# Patient Record
Sex: MALE | Race: BLACK OR AFRICAN AMERICAN | NOT HISPANIC OR LATINO | Employment: OTHER | URBAN - METROPOLITAN AREA
[De-identification: names, ages, dates, MRNs, and addresses within clinical notes are randomized per-mention and may not be internally consistent; named-entity substitution may affect disease eponyms.]

---

## 2020-07-22 ENCOUNTER — LAB VISIT (OUTPATIENT)
Dept: LAB | Facility: OTHER | Age: 80
End: 2020-07-22
Payer: MEDICARE

## 2020-07-22 DIAGNOSIS — Z03.818 ENCOUNTER FOR OBSERVATION FOR SUSPECTED EXPOSURE TO OTHER BIOLOGICAL AGENTS RULED OUT: ICD-10-CM

## 2020-07-22 DIAGNOSIS — Z20.822 SUSPECTED COVID-19 VIRUS INFECTION: ICD-10-CM

## 2020-07-22 PROCEDURE — U0003 INFECTIOUS AGENT DETECTION BY NUCLEIC ACID (DNA OR RNA); SEVERE ACUTE RESPIRATORY SYNDROME CORONAVIRUS 2 (SARS-COV-2) (CORONAVIRUS DISEASE [COVID-19]), AMPLIFIED PROBE TECHNIQUE, MAKING USE OF HIGH THROUGHPUT TECHNOLOGIES AS DESCRIBED BY CMS-2020-01-R: HCPCS

## 2020-07-26 LAB — SARS-COV-2 RNA RESP QL NAA+PROBE: NEGATIVE

## 2020-08-18 ENCOUNTER — HOSPITAL ENCOUNTER (EMERGENCY)
Facility: HOSPITAL | Age: 80
Discharge: HOME | End: 2020-08-18
Attending: EMERGENCY MEDICINE
Payer: MEDICARE

## 2020-08-18 VITALS
TEMPERATURE: 98 F | DIASTOLIC BLOOD PRESSURE: 85 MMHG | SYSTOLIC BLOOD PRESSURE: 178 MMHG | HEART RATE: 101 BPM | RESPIRATION RATE: 20 BRPM | OXYGEN SATURATION: 99 % | HEIGHT: 72 IN | BODY MASS INDEX: 25.73 KG/M2 | WEIGHT: 190 LBS

## 2020-08-18 DIAGNOSIS — R53.1 GENERALIZED WEAKNESS: Primary | ICD-10-CM

## 2020-08-18 LAB
ALBUMIN SERPL BCP-MCNC: 3.4 G/DL (ref 3.5–5.2)
ALP SERPL-CCNC: 101 U/L (ref 55–135)
ALT SERPL W/O P-5'-P-CCNC: 9 U/L (ref 10–44)
ANION GAP SERPL CALC-SCNC: 7 MMOL/L (ref 8–16)
AST SERPL-CCNC: 20 U/L (ref 10–40)
BASOPHILS # BLD AUTO: 0 K/UL (ref 0–0.2)
BASOPHILS NFR BLD: 0 % (ref 0–1.9)
BILIRUB SERPL-MCNC: 0.7 MG/DL (ref 0.1–1)
BILIRUB UR QL STRIP: NEGATIVE
BUN SERPL-MCNC: 10 MG/DL (ref 8–23)
CALCIUM SERPL-MCNC: 9.7 MG/DL (ref 8.7–10.5)
CHLORIDE SERPL-SCNC: 105 MMOL/L (ref 95–110)
CLARITY UR: CLEAR
CO2 SERPL-SCNC: 28 MMOL/L (ref 23–29)
COLOR UR: NORMAL
CREAT SERPL-MCNC: 0.9 MG/DL (ref 0.5–1.4)
DIFFERENTIAL METHOD: ABNORMAL
EOSINOPHIL # BLD AUTO: 0.1 K/UL (ref 0–0.5)
EOSINOPHIL NFR BLD: 2.7 % (ref 0–8)
ERYTHROCYTE [DISTWIDTH] IN BLOOD BY AUTOMATED COUNT: 13.8 % (ref 11.5–14.5)
EST. GFR  (AFRICAN AMERICAN): >60 ML/MIN/1.73 M^2
EST. GFR  (NON AFRICAN AMERICAN): >60 ML/MIN/1.73 M^2
GLUCOSE SERPL-MCNC: 98 MG/DL (ref 70–110)
GLUCOSE UR QL STRIP: NEGATIVE
HCT VFR BLD AUTO: 39.3 % (ref 40–54)
HGB BLD-MCNC: 12.4 G/DL (ref 14–18)
HGB UR QL STRIP: NEGATIVE
IMM GRANULOCYTES # BLD AUTO: 0.01 K/UL (ref 0–0.04)
IMM GRANULOCYTES NFR BLD AUTO: 0.3 % (ref 0–0.5)
KETONES UR QL STRIP: NEGATIVE
LACTATE SERPL-SCNC: 1.5 MMOL/L (ref 0.5–2.2)
LEUKOCYTE ESTERASE UR QL STRIP: NEGATIVE
LYMPHOCYTES # BLD AUTO: 0.6 K/UL (ref 1–4.8)
LYMPHOCYTES NFR BLD: 20.9 % (ref 18–48)
MAGNESIUM SERPL-MCNC: 1.7 MG/DL (ref 1.6–2.6)
MCH RBC QN AUTO: 26.1 PG (ref 27–31)
MCHC RBC AUTO-ENTMCNC: 31.6 G/DL (ref 32–36)
MCV RBC AUTO: 83 FL (ref 82–98)
MONOCYTES # BLD AUTO: 0.3 K/UL (ref 0.3–1)
MONOCYTES NFR BLD: 11 % (ref 4–15)
NEUTROPHILS # BLD AUTO: 2 K/UL (ref 1.8–7.7)
NEUTROPHILS NFR BLD: 65.1 % (ref 38–73)
NITRITE UR QL STRIP: NEGATIVE
NRBC BLD-RTO: 0 /100 WBC
PH UR STRIP: 6 [PH] (ref 5–8)
PLATELET # BLD AUTO: 183 K/UL (ref 150–350)
PMV BLD AUTO: 8.8 FL (ref 9.2–12.9)
POTASSIUM SERPL-SCNC: 4.1 MMOL/L (ref 3.5–5.1)
PROT SERPL-MCNC: 7.4 G/DL (ref 6–8.4)
PROT UR QL STRIP: NEGATIVE
RBC # BLD AUTO: 4.75 M/UL (ref 4.6–6.2)
SODIUM SERPL-SCNC: 140 MMOL/L (ref 136–145)
SP GR UR STRIP: 1 (ref 1–1.03)
T4 FREE SERPL-MCNC: 1 NG/DL (ref 0.71–1.51)
TSH SERPL DL<=0.005 MIU/L-ACNC: 0.31 UIU/ML (ref 0.4–4)
URN SPEC COLLECT METH UR: NORMAL
UROBILINOGEN UR STRIP-ACNC: NEGATIVE EU/DL
WBC # BLD AUTO: 3.01 K/UL (ref 3.9–12.7)

## 2020-08-18 PROCEDURE — 81003 URINALYSIS AUTO W/O SCOPE: CPT

## 2020-08-18 PROCEDURE — 84439 ASSAY OF FREE THYROXINE: CPT

## 2020-08-18 PROCEDURE — 96360 HYDRATION IV INFUSION INIT: CPT

## 2020-08-18 PROCEDURE — 25000003 PHARM REV CODE 250: Performed by: EMERGENCY MEDICINE

## 2020-08-18 PROCEDURE — 85025 COMPLETE CBC W/AUTO DIFF WBC: CPT

## 2020-08-18 PROCEDURE — 83735 ASSAY OF MAGNESIUM: CPT

## 2020-08-18 PROCEDURE — 99284 EMERGENCY DEPT VISIT MOD MDM: CPT | Mod: 25

## 2020-08-18 PROCEDURE — 93010 ELECTROCARDIOGRAM REPORT: CPT | Mod: ,,, | Performed by: INTERNAL MEDICINE

## 2020-08-18 PROCEDURE — 80053 COMPREHEN METABOLIC PANEL: CPT

## 2020-08-18 PROCEDURE — 93005 ELECTROCARDIOGRAM TRACING: CPT

## 2020-08-18 PROCEDURE — 96361 HYDRATE IV INFUSION ADD-ON: CPT

## 2020-08-18 PROCEDURE — 83605 ASSAY OF LACTIC ACID: CPT

## 2020-08-18 PROCEDURE — 84443 ASSAY THYROID STIM HORMONE: CPT

## 2020-08-18 PROCEDURE — 93010 EKG 12-LEAD: ICD-10-PCS | Mod: ,,, | Performed by: INTERNAL MEDICINE

## 2020-08-18 RX ORDER — ATORVASTATIN CALCIUM 20 MG/1
20 TABLET, FILM COATED ORAL NIGHTLY
Status: ON HOLD | COMMUNITY
End: 2022-10-03 | Stop reason: HOSPADM

## 2020-08-18 RX ORDER — FINASTERIDE 5 MG/1
5 TABLET, FILM COATED ORAL DAILY
COMMUNITY

## 2020-08-18 RX ORDER — FLUTICASONE PROPIONATE 50 MCG
1 SPRAY, SUSPENSION (ML) NASAL DAILY
COMMUNITY

## 2020-08-18 RX ORDER — LATANOPROST 50 UG/ML
SOLUTION/ DROPS OPHTHALMIC NIGHTLY
COMMUNITY

## 2020-08-18 RX ORDER — PREDNISOLONE ACETATE 10 MG/ML
1 SUSPENSION/ DROPS OPHTHALMIC NIGHTLY
COMMUNITY

## 2020-08-18 RX ORDER — LOSARTAN POTASSIUM 100 MG/1
100 TABLET ORAL DAILY
COMMUNITY

## 2020-08-18 RX ORDER — METFORMIN HYDROCHLORIDE 500 MG/1
500 TABLET ORAL 2 TIMES DAILY WITH MEALS
COMMUNITY
End: 2022-08-22

## 2020-08-18 RX ORDER — DORZOLAMIDE HYDROCHLORIDE AND TIMOLOL MALEATE 20; 5 MG/ML; MG/ML
1 SOLUTION/ DROPS OPHTHALMIC 2 TIMES DAILY
COMMUNITY
End: 2022-08-22 | Stop reason: DRUGHIGH

## 2020-08-18 RX ORDER — SENNOSIDES 8.6 MG/1
1 TABLET ORAL DAILY
COMMUNITY
End: 2022-08-22

## 2020-08-18 RX ORDER — TAMSULOSIN HYDROCHLORIDE 0.4 MG/1
0.4 CAPSULE ORAL NIGHTLY
COMMUNITY

## 2020-08-18 RX ORDER — BRIMONIDINE TARTRATE 2 MG/ML
1 SOLUTION/ DROPS OPHTHALMIC EVERY 8 HOURS
COMMUNITY
End: 2022-08-22

## 2020-08-18 RX ADMIN — SODIUM CHLORIDE 1000 ML: 0.9 INJECTION, SOLUTION INTRAVENOUS at 01:08

## 2020-08-18 RX ADMIN — SODIUM CHLORIDE 1000 ML: 0.9 INJECTION, SOLUTION INTRAVENOUS at 11:08

## 2020-08-18 NOTE — ED NOTES
Patient's daughter, Hallie Franklin,  called and updated about patient's status and plan of care. Phone number for family member is [phone].

## 2020-08-18 NOTE — ED TRIAGE NOTES
The patient presents to the ED via NO[facility]S. Per ems, they were called to [facility], where the patient resides, because patient was lethargic and had generalized weakness since about 0900. Patient is awake with his eyes clothes upon arrival. Once he was room to ED bed 20, he answered questions appropriately, but was not oriented to time/date. No unilateral weakness noted. NO facial drooping noted. 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 Patient able to speak in complete sentences, though not very clear.

## 2020-08-18 NOTE — ED PROVIDER NOTES
Encounter Date: 8/18/2020       History     Chief Complaint   Patient presents with    Weakness     The patient presents [facility]EMS. Per ems, patient is from [facility], and they were called for patient being weak and lethargic. EMS denies patient having unilateral weakness. Patient is orineted to self, but baseline mental status is unknown.     Fatigue     HPI   This 80-year-old male presents to the emergency room from [facility].  He was sent for evaluation for being weak and lethargic.  The patient reports that he feels fine.  With prompting, he will admit that he has some weakness.  He denies fever chills cough painful urination rectal bleeding dark tarry stools.  The patient is blind.  Review of patient's allergies indicates:  No Known Allergies  Past Medical History:   Diagnosis Date    Essential hypertension     Legal blindness     Preglaucoma     Type 2 diabetes mellitus      History reviewed. No pertinent surgical history.  History reviewed. No pertinent family history.  Social History     Tobacco Use    Smoking status: Former Smoker    Smokeless tobacco: Never Used   Substance Use Topics    Alcohol use: Not Currently    Drug use: Not Currently     Review of Systems  The patient was questioned specifically with regard to the following.  General: Fever, chills, sweats. Neuro: Headache. Eyes: eye problems. ENT: Ear pain, sore throat. Cardiovascular: Chest pain. Respiratory: Cough, shortness of breath. Gastrointestinal: Abdominal pain, vomiting, diarrhea. Genitourinary: Painful urination.  Musculoskeletal: Arm and leg problems. Skin: Rash.  The review of systems was negative except for the following:  Generalized weakness  Physical Exam     Initial Vitals [08/18/20 1052]   BP Pulse Resp Temp SpO2   (!) 168/76 73 18 97.5 °F (36.4 °C) 96 %      MAP       --         Physical Exam  The patient was examined specifically for the following:   General:No significant distress, Good  color, Warm and dry. Head and neck:Scalp atraumatic, Neck supple. Neurological:Appropriate conversation, Gross motor deficits. Eyes:Conjugate gaze, Clear corneas. ENT: No epistaxis. Cardiac: Regular rate and rhythm, Grossly normal heart tones. Pulmonary: Wheezing, Rales. Gastrointestinal: Abdominal tenderness, Abdominal distention. Musculoskeletal: Extremity deformity, Apparent pain with range of motion of the joints. Skin: Rash.   The findings on examination were normal except for the following:  Patient is blind.  The mouth is dry.  Vital signs are stable.  The lungs are clear.  The heart tones are normal.  The abdomen is nontender.  The patient is oriented to month and place.  Cranial nerves motor and sensory examination are grossly normal, except for blindness.   ED Course   Procedures  Labs Reviewed   COMPREHENSIVE METABOLIC PANEL - Abnormal; Notable for the following components:       Result Value    Albumin 3.4 (*)     ALT 9 (*)     Anion Gap 7 (*)     All other components within normal limits   CBC W/ AUTO DIFFERENTIAL - Abnormal; Notable for the following components:    WBC 3.01 (*)     Hemoglobin 12.4 (*)     Hematocrit 39.3 (*)     Mean Corpuscular Hemoglobin 26.1 (*)     Mean Corpuscular Hemoglobin Conc 31.6 (*)     MPV 8.8 (*)     Lymph # 0.6 (*)     All other components within normal limits   TSH - Abnormal; Notable for the following components:    TSH 0.314 (*)     All other components within normal limits   LACTIC ACID, PLASMA   URINALYSIS, REFLEX TO URINE CULTURE    Narrative:     Specimen Source->Urine   MAGNESIUM   T4, FREE     EKG Readings: (Independently Interpreted)   This patient is in a sinus rhythm with a heart rate of 68.  There is an interventricular conduction delay.  There are nonspecific ST segment and T-wave changes.  There is no evidence of acute myocardial infarction or malignant arrhythmia.     ECG Results          EKG 12-lead (In process)  Result time 08/18/20 11:55:51    In  process by Interface, Lab In [facility] (08/18/20 11:55:51)                 Narrative:    Test Reason : R53.1,    Vent. Rate : 068 BPM     Atrial Rate : 068 BPM     P-R Int : 168 ms          QRS Dur : 138 ms      QT Int : 416 ms       P-R-T Axes : 057 068 -80 degrees     QTc Int : 442 ms    Sinus rhythm with Premature atrial complexes  Nonspecific intraventricular block  Abnormal QRS-T angle, consider primary T wave abnormality  Abnormal ECG  No previous ECGs available    Referred By: [email or web address]   SELF           Confirmed By:                   In process by Interface, Lab In [facility] (08/18/20 11:52:07)                 Narrative:    Test Reason : R53.1,    Vent. Rate : 068 BPM     Atrial Rate : 068 BPM     P-R Int : 168 ms          QRS Dur : 138 ms      QT Int : 416 ms       P-R-T Axes : 057 068 -80 degrees     QTc Int : 442 ms    Sinus rhythm with Premature atrial complexes  Nonspecific intraventricular block  Abnormal QRS-T angle, consider primary T wave abnormality  Abnormal ECG  No previous ECGs available    Referred By: [email or web address]   SELF           Confirmed By:                             Imaging Results    None       Medical decision making:  Given the above this patient presents with some possible weakness.  Laboratory evaluation is essentially unremarkable.  The patient has no significant anemia hepatic or renal failure.  Electrolytes are unremarkable.  I will discharge to outpatient evaluation and treatment.  There is no evidence of urinary tract or other infection.  Lactate is normal.  I will discharge to outpatient evaluation and treatment.                                     Clinical Impression:       ICD-10-CM ICD-9-CM   1. Generalized weakness  R53.1 780.79             ED Disposition Condition    Discharge Stable        ED Prescriptions     None        Follow-up Information     Follow up With Specialties Details Why Contact Info    Troy Pagan MD Internal Medicine, Wound Care In 3 days  [address]  [facility] [address]  [phone]                                       Abel Olmstead MD  08/18/20 1151       Abel Olmstead MD  08/18/20 1211       Abel Olmstead MD  08/18/20 4538

## 2020-08-18 NOTE — ED NOTES
Patient still unable to void at this time. Urinal on stretcher. Patient is aware that sample is needed.

## 2020-08-18 NOTE — DISCHARGE INSTRUCTIONS
Regular meals.  Lots of liquids.  Please return if you get worse or if new problems develop.  Rest.

## 2020-08-18 NOTE — ED PROVIDER NOTES
Encounter Date: 8/18/2020    SCRIBE #1 NOTE: I, Saeed Kaye, am scribing for, and in the presence of,  Abel Olmstead MD. I have scribed the following portions of the note - Other sections scribed: HPI, ROS.       History     Chief Complaint   Patient presents with    Weakness     The patient presents [facility]EMS. Per ems, patient is from [facility], and they were called for patient being weak and lethargic. EMS denies patient having unilateral weakness. Patient is orineted to self, but baseline mental status is unknown.     Fatigue     Adalberto Medina is a 80 y.o. 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 Per EMS, patient was brought to the ED from [facility] because patient was lethargic and had generalized weakness.  EMS reports he was able to stand up and walk with help. Denies any fever, chills, headache, eye problems, ear problems, sore throat, chest pain, shortness of breath, dyspnea, abdominal pain, nausea, vomiting, diarrhea, urinary problems, rash, or extremity problems.  Daughter claims patient is normally A&O x4. No pertinent past surgical history.  No known drug allergies.     The history is provided by the patient.     Review of patient's allergies indicates:  No Known Allergies  Past Medical History:   Diagnosis Date    Essential hypertension     Legal blindness     Preglaucoma     Type 2 diabetes mellitus      History reviewed. No pertinent surgical history.  History reviewed. No pertinent family history.  Social History     Tobacco Use    Smoking status: Former Smoker    Smokeless tobacco: Never Used   Substance Use Topics    Alcohol use: Not Currently    Drug use: Not Currently     Review of Systems   Constitutional: Positive for fatigue. Negative for chills, diaphoresis and fever.   HENT: Negative for ear pain and sore throat.    Eyes: Negative for pain.    Respiratory: Negative for cough and shortness of breath.         Negative for dyspnea.    Cardiovascular: Negative for chest pain.   Gastrointestinal: Negative for abdominal pain, diarrhea, nausea and vomiting.   Genitourinary: Negative for difficulty urinating, dysuria and hematuria.   Musculoskeletal:        Negative for arm or leg problems.    Skin: Negative for rash.   Neurological: Positive for weakness. Negative for headaches.       Physical Exam     Initial Vitals [08/18/20 1052]   BP Pulse Resp Temp SpO2   (!) 168/76 73 18 97.5 °F (36.4 °C) 96 %      MAP       --         Physical Exam    ED Course   Procedures  Labs Reviewed   COMPREHENSIVE METABOLIC PANEL   CBC W/ AUTO DIFFERENTIAL   LACTIC ACID, PLASMA   URINALYSIS, REFLEX TO URINE CULTURE   MAGNESIUM   TSH        ECG Results          EKG 12-lead (In process)  Result time 08/18/20 11:55:51    In process by Interface, Lab In [facility] (08/18/20 11:55:51)                 Narrative:    Test Reason : R53.1,    Vent. Rate : 068 BPM     Atrial Rate : 068 BPM     P-R Int : 168 ms          QRS Dur : 138 ms      QT Int : 416 ms       P-R-T Axes : 057 068 -80 degrees     QTc Int : 442 ms    Sinus rhythm with Premature atrial complexes  Nonspecific intraventricular block  Abnormal QRS-T angle, consider primary T wave abnormality  Abnormal ECG  No previous ECGs available    Referred By: [email or web address]   SELF           Confirmed By:                   In process by Interface, Lab In [facility] (08/18/20 11:52:07)                 Narrative:    Test Reason : R53.1,    Vent. Rate : 068 BPM     Atrial Rate : 068 BPM     P-R Int : 168 ms          QRS Dur : 138 ms      QT Int : 416 ms       P-R-T Axes : 057 068 -80 degrees     QTc Int : 442 ms    Sinus rhythm with Premature atrial complexes  Nonspecific intraventricular block  Abnormal QRS-T angle, consider primary T wave abnormality  Abnormal ECG  No previous ECGs available    Referred By: [email or web address]   SELF           Confirmed By:                              Imaging Results    None                     Scribe Attestation:   Scribe #1: I performed the above scribed service and the documentation accurately describes the services I performed. I attest to the accuracy of the note.                          Clinical Impression:       ICD-10-CM ICD-9-CM   1. Weakness  R53.1 780.79                 Scribe ***

## 2020-08-31 ENCOUNTER — LAB VISIT (OUTPATIENT)
Dept: LAB | Facility: OTHER | Age: 80
End: 2020-08-31
Payer: MEDICARE

## 2020-08-31 DIAGNOSIS — Z03.818 ENCOUNTER FOR OBSERVATION FOR SUSPECTED EXPOSURE TO OTHER BIOLOGICAL AGENTS RULED OUT: ICD-10-CM

## 2020-08-31 PROCEDURE — U0003 INFECTIOUS AGENT DETECTION BY NUCLEIC ACID (DNA OR RNA); SEVERE ACUTE RESPIRATORY SYNDROME CORONAVIRUS 2 (SARS-COV-2) (CORONAVIRUS DISEASE [COVID-19]), AMPLIFIED PROBE TECHNIQUE, MAKING USE OF HIGH THROUGHPUT TECHNOLOGIES AS DESCRIBED BY CMS-2020-01-R: HCPCS

## 2020-09-01 LAB — SARS-COV-2 RNA RESP QL NAA+PROBE: NOT DETECTED

## 2020-09-07 ENCOUNTER — LAB VISIT (OUTPATIENT)
Dept: LAB | Facility: OTHER | Age: 80
End: 2020-09-07
Attending: INTERNAL MEDICINE
Payer: MEDICARE

## 2020-09-07 DIAGNOSIS — Z03.818 ENCOUNTER FOR OBSERVATION FOR SUSPECTED EXPOSURE TO OTHER BIOLOGICAL AGENTS RULED OUT: ICD-10-CM

## 2020-09-07 PROCEDURE — U0003 INFECTIOUS AGENT DETECTION BY NUCLEIC ACID (DNA OR RNA); SEVERE ACUTE RESPIRATORY SYNDROME CORONAVIRUS 2 (SARS-COV-2) (CORONAVIRUS DISEASE [COVID-19]), AMPLIFIED PROBE TECHNIQUE, MAKING USE OF HIGH THROUGHPUT TECHNOLOGIES AS DESCRIBED BY CMS-2020-01-R: HCPCS

## 2020-09-08 LAB — SARS-COV-2 RNA RESP QL NAA+PROBE: NOT DETECTED

## 2020-09-14 ENCOUNTER — LAB VISIT (OUTPATIENT)
Dept: LAB | Facility: OTHER | Age: 80
End: 2020-09-14
Payer: MEDICARE

## 2020-09-14 DIAGNOSIS — Z03.818 ENCOUNTER FOR OBSERVATION FOR SUSPECTED EXPOSURE TO OTHER BIOLOGICAL AGENTS RULED OUT: ICD-10-CM

## 2020-09-14 PROCEDURE — U0003 INFECTIOUS AGENT DETECTION BY NUCLEIC ACID (DNA OR RNA); SEVERE ACUTE RESPIRATORY SYNDROME CORONAVIRUS 2 (SARS-COV-2) (CORONAVIRUS DISEASE [COVID-19]), AMPLIFIED PROBE TECHNIQUE, MAKING USE OF HIGH THROUGHPUT TECHNOLOGIES AS DESCRIBED BY CMS-2020-01-R: HCPCS

## 2020-09-15 LAB — SARS-COV-2 RNA RESP QL NAA+PROBE: NOT DETECTED

## 2020-09-21 ENCOUNTER — LAB VISIT (OUTPATIENT)
Dept: LAB | Facility: OTHER | Age: 80
End: 2020-09-21
Payer: MEDICARE

## 2020-09-21 DIAGNOSIS — Z03.818 ENCOUNTER FOR OBSERVATION FOR SUSPECTED EXPOSURE TO OTHER BIOLOGICAL AGENTS RULED OUT: ICD-10-CM

## 2020-09-21 PROCEDURE — U0003 INFECTIOUS AGENT DETECTION BY NUCLEIC ACID (DNA OR RNA); SEVERE ACUTE RESPIRATORY SYNDROME CORONAVIRUS 2 (SARS-COV-2) (CORONAVIRUS DISEASE [COVID-19]), AMPLIFIED PROBE TECHNIQUE, MAKING USE OF HIGH THROUGHPUT TECHNOLOGIES AS DESCRIBED BY CMS-2020-01-R: HCPCS

## 2020-09-22 LAB — SARS-COV-2 RNA RESP QL NAA+PROBE: NOT DETECTED

## 2020-09-28 ENCOUNTER — LAB VISIT (OUTPATIENT)
Dept: LAB | Facility: OTHER | Age: 80
End: 2020-09-28
Payer: MEDICARE

## 2020-09-28 DIAGNOSIS — Z03.818 ENCOUNTER FOR OBSERVATION FOR SUSPECTED EXPOSURE TO OTHER BIOLOGICAL AGENTS RULED OUT: ICD-10-CM

## 2020-09-28 PROCEDURE — U0003 INFECTIOUS AGENT DETECTION BY NUCLEIC ACID (DNA OR RNA); SEVERE ACUTE RESPIRATORY SYNDROME CORONAVIRUS 2 (SARS-COV-2) (CORONAVIRUS DISEASE [COVID-19]), AMPLIFIED PROBE TECHNIQUE, MAKING USE OF HIGH THROUGHPUT TECHNOLOGIES AS DESCRIBED BY CMS-2020-01-R: HCPCS

## 2020-10-01 LAB — SARS-COV-2 RNA RESP QL NAA+PROBE: NOT DETECTED

## 2020-10-05 ENCOUNTER — LAB VISIT (OUTPATIENT)
Dept: LAB | Facility: OTHER | Age: 80
End: 2020-10-05
Payer: MEDICARE

## 2020-10-05 DIAGNOSIS — Z03.818 ENCOUNTER FOR OBSERVATION FOR SUSPECTED EXPOSURE TO OTHER BIOLOGICAL AGENTS RULED OUT: ICD-10-CM

## 2020-10-05 PROCEDURE — U0003 INFECTIOUS AGENT DETECTION BY NUCLEIC ACID (DNA OR RNA); SEVERE ACUTE RESPIRATORY SYNDROME CORONAVIRUS 2 (SARS-COV-2) (CORONAVIRUS DISEASE [COVID-19]), AMPLIFIED PROBE TECHNIQUE, MAKING USE OF HIGH THROUGHPUT TECHNOLOGIES AS DESCRIBED BY CMS-2020-01-R: HCPCS

## 2020-10-06 LAB — SARS-COV-2 RNA RESP QL NAA+PROBE: NOT DETECTED

## 2020-11-23 ENCOUNTER — LAB VISIT (OUTPATIENT)
Dept: LAB | Facility: OTHER | Age: 80
End: 2020-11-23
Payer: MEDICARE

## 2020-11-23 DIAGNOSIS — Z03.818 ENCOUNTER FOR OBSERVATION FOR SUSPECTED EXPOSURE TO OTHER BIOLOGICAL AGENTS RULED OUT: ICD-10-CM

## 2020-11-23 PROCEDURE — U0003 INFECTIOUS AGENT DETECTION BY NUCLEIC ACID (DNA OR RNA); SEVERE ACUTE RESPIRATORY SYNDROME CORONAVIRUS 2 (SARS-COV-2) (CORONAVIRUS DISEASE [COVID-19]), AMPLIFIED PROBE TECHNIQUE, MAKING USE OF HIGH THROUGHPUT TECHNOLOGIES AS DESCRIBED BY CMS-2020-01-R: HCPCS

## 2020-11-24 LAB — SARS-COV-2 RNA RESP QL NAA+PROBE: NOT DETECTED

## 2020-11-30 ENCOUNTER — LAB VISIT (OUTPATIENT)
Dept: LAB | Facility: OTHER | Age: 80
End: 2020-11-30
Payer: MEDICARE

## 2020-11-30 DIAGNOSIS — Z03.818 ENCOUNTER FOR OBSERVATION FOR SUSPECTED EXPOSURE TO OTHER BIOLOGICAL AGENTS RULED OUT: ICD-10-CM

## 2020-11-30 PROCEDURE — U0003 INFECTIOUS AGENT DETECTION BY NUCLEIC ACID (DNA OR RNA); SEVERE ACUTE RESPIRATORY SYNDROME CORONAVIRUS 2 (SARS-COV-2) (CORONAVIRUS DISEASE [COVID-19]), AMPLIFIED PROBE TECHNIQUE, MAKING USE OF HIGH THROUGHPUT TECHNOLOGIES AS DESCRIBED BY CMS-2020-01-R: HCPCS

## 2020-12-02 LAB — SARS-COV-2 RNA RESP QL NAA+PROBE: NOT DETECTED

## 2020-12-07 ENCOUNTER — LAB VISIT (OUTPATIENT)
Dept: LAB | Facility: OTHER | Age: 80
End: 2020-12-07
Payer: MEDICARE

## 2020-12-07 DIAGNOSIS — Z03.818 ENCOUNTER FOR OBSERVATION FOR SUSPECTED EXPOSURE TO OTHER BIOLOGICAL AGENTS RULED OUT: ICD-10-CM

## 2020-12-07 PROCEDURE — U0003 INFECTIOUS AGENT DETECTION BY NUCLEIC ACID (DNA OR RNA); SEVERE ACUTE RESPIRATORY SYNDROME CORONAVIRUS 2 (SARS-COV-2) (CORONAVIRUS DISEASE [COVID-19]), AMPLIFIED PROBE TECHNIQUE, MAKING USE OF HIGH THROUGHPUT TECHNOLOGIES AS DESCRIBED BY CMS-2020-01-R: HCPCS

## 2020-12-09 LAB — SARS-COV-2 RNA RESP QL NAA+PROBE: NOT DETECTED

## 2020-12-10 ENCOUNTER — LAB VISIT (OUTPATIENT)
Dept: LAB | Facility: OTHER | Age: 80
End: 2020-12-10
Payer: MEDICARE

## 2020-12-10 DIAGNOSIS — Z03.818 ENCOUNTER FOR OBSERVATION FOR SUSPECTED EXPOSURE TO OTHER BIOLOGICAL AGENTS RULED OUT: ICD-10-CM

## 2020-12-10 PROCEDURE — U0003 INFECTIOUS AGENT DETECTION BY NUCLEIC ACID (DNA OR RNA); SEVERE ACUTE RESPIRATORY SYNDROME CORONAVIRUS 2 (SARS-COV-2) (CORONAVIRUS DISEASE [COVID-19]), AMPLIFIED PROBE TECHNIQUE, MAKING USE OF HIGH THROUGHPUT TECHNOLOGIES AS DESCRIBED BY CMS-2020-01-R: HCPCS

## 2020-12-12 LAB — SARS-COV-2 RNA RESP QL NAA+PROBE: NOT DETECTED

## 2020-12-21 ENCOUNTER — LAB VISIT (OUTPATIENT)
Dept: LAB | Facility: OTHER | Age: 80
End: 2020-12-21
Payer: MEDICARE

## 2020-12-21 DIAGNOSIS — Z03.818 ENCOUNTER FOR OBSERVATION FOR SUSPECTED EXPOSURE TO OTHER BIOLOGICAL AGENTS RULED OUT: ICD-10-CM

## 2020-12-21 PROCEDURE — U0003 INFECTIOUS AGENT DETECTION BY NUCLEIC ACID (DNA OR RNA); SEVERE ACUTE RESPIRATORY SYNDROME CORONAVIRUS 2 (SARS-COV-2) (CORONAVIRUS DISEASE [COVID-19]), AMPLIFIED PROBE TECHNIQUE, MAKING USE OF HIGH THROUGHPUT TECHNOLOGIES AS DESCRIBED BY CMS-2020-01-R: HCPCS

## 2020-12-23 LAB — SARS-COV-2 RNA RESP QL NAA+PROBE: NOT DETECTED

## 2020-12-28 ENCOUNTER — LAB VISIT (OUTPATIENT)
Dept: LAB | Facility: OTHER | Age: 80
End: 2020-12-28
Payer: MEDICARE

## 2020-12-28 DIAGNOSIS — Z03.818 ENCOUNTER FOR OBSERVATION FOR SUSPECTED EXPOSURE TO OTHER BIOLOGICAL AGENTS RULED OUT: ICD-10-CM

## 2020-12-28 PROCEDURE — U0003 INFECTIOUS AGENT DETECTION BY NUCLEIC ACID (DNA OR RNA); SEVERE ACUTE RESPIRATORY SYNDROME CORONAVIRUS 2 (SARS-COV-2) (CORONAVIRUS DISEASE [COVID-19]), AMPLIFIED PROBE TECHNIQUE, MAKING USE OF HIGH THROUGHPUT TECHNOLOGIES AS DESCRIBED BY CMS-2020-01-R: HCPCS

## 2020-12-29 LAB — SARS-COV-2 RNA RESP QL NAA+PROBE: NOT DETECTED

## 2021-01-05 ENCOUNTER — LAB VISIT (OUTPATIENT)
Dept: LAB | Facility: OTHER | Age: 81
End: 2021-01-05
Payer: MEDICARE

## 2021-01-05 DIAGNOSIS — Z03.818 ENCOUNTER FOR OBSERVATION FOR SUSPECTED EXPOSURE TO OTHER BIOLOGICAL AGENTS RULED OUT: ICD-10-CM

## 2021-01-05 PROCEDURE — U0003 INFECTIOUS AGENT DETECTION BY NUCLEIC ACID (DNA OR RNA); SEVERE ACUTE RESPIRATORY SYNDROME CORONAVIRUS 2 (SARS-COV-2) (CORONAVIRUS DISEASE [COVID-19]), AMPLIFIED PROBE TECHNIQUE, MAKING USE OF HIGH THROUGHPUT TECHNOLOGIES AS DESCRIBED BY CMS-2020-01-R: HCPCS

## 2021-01-07 LAB — SARS-COV-2 RNA RESP QL NAA+PROBE: NOT DETECTED

## 2021-01-12 ENCOUNTER — LAB VISIT (OUTPATIENT)
Dept: LAB | Facility: OTHER | Age: 81
End: 2021-01-12
Payer: MEDICARE

## 2021-01-12 DIAGNOSIS — Z20.822 ENCOUNTER FOR LABORATORY TESTING FOR COVID-19 VIRUS: ICD-10-CM

## 2021-01-12 PROCEDURE — U0003 INFECTIOUS AGENT DETECTION BY NUCLEIC ACID (DNA OR RNA); SEVERE ACUTE RESPIRATORY SYNDROME CORONAVIRUS 2 (SARS-COV-2) (CORONAVIRUS DISEASE [COVID-19]), AMPLIFIED PROBE TECHNIQUE, MAKING USE OF HIGH THROUGHPUT TECHNOLOGIES AS DESCRIBED BY CMS-2020-01-R: HCPCS

## 2021-01-14 LAB — SARS-COV-2 RNA RESP QL NAA+PROBE: NOT DETECTED

## 2021-01-19 ENCOUNTER — LAB VISIT (OUTPATIENT)
Dept: LAB | Facility: OTHER | Age: 81
End: 2021-01-19
Payer: MEDICARE

## 2021-01-19 DIAGNOSIS — Z20.822 ENCOUNTER FOR LABORATORY TESTING FOR COVID-19 VIRUS: ICD-10-CM

## 2021-01-19 PROCEDURE — U0003 INFECTIOUS AGENT DETECTION BY NUCLEIC ACID (DNA OR RNA); SEVERE ACUTE RESPIRATORY SYNDROME CORONAVIRUS 2 (SARS-COV-2) (CORONAVIRUS DISEASE [COVID-19]), AMPLIFIED PROBE TECHNIQUE, MAKING USE OF HIGH THROUGHPUT TECHNOLOGIES AS DESCRIBED BY CMS-2020-01-R: HCPCS

## 2021-01-21 LAB — SARS-COV-2 RNA RESP QL NAA+PROBE: NOT DETECTED

## 2021-01-26 ENCOUNTER — LAB VISIT (OUTPATIENT)
Dept: LAB | Facility: OTHER | Age: 81
End: 2021-01-26
Payer: MEDICARE

## 2021-01-26 DIAGNOSIS — Z20.822 ENCOUNTER FOR LABORATORY TESTING FOR COVID-19 VIRUS: ICD-10-CM

## 2021-01-26 PROCEDURE — U0003 INFECTIOUS AGENT DETECTION BY NUCLEIC ACID (DNA OR RNA); SEVERE ACUTE RESPIRATORY SYNDROME CORONAVIRUS 2 (SARS-COV-2) (CORONAVIRUS DISEASE [COVID-19]), AMPLIFIED PROBE TECHNIQUE, MAKING USE OF HIGH THROUGHPUT TECHNOLOGIES AS DESCRIBED BY CMS-2020-01-R: HCPCS

## 2021-01-27 LAB — SARS-COV-2 RNA RESP QL NAA+PROBE: NOT DETECTED

## 2021-02-02 ENCOUNTER — LAB VISIT (OUTPATIENT)
Dept: LAB | Facility: OTHER | Age: 81
End: 2021-02-02
Payer: MEDICARE

## 2021-02-02 DIAGNOSIS — Z20.822 ENCOUNTER FOR LABORATORY TESTING FOR COVID-19 VIRUS: ICD-10-CM

## 2021-02-02 PROCEDURE — U0003 INFECTIOUS AGENT DETECTION BY NUCLEIC ACID (DNA OR RNA); SEVERE ACUTE RESPIRATORY SYNDROME CORONAVIRUS 2 (SARS-COV-2) (CORONAVIRUS DISEASE [COVID-19]), AMPLIFIED PROBE TECHNIQUE, MAKING USE OF HIGH THROUGHPUT TECHNOLOGIES AS DESCRIBED BY CMS-2020-01-R: HCPCS

## 2021-02-03 LAB — SARS-COV-2 RNA RESP QL NAA+PROBE: NOT DETECTED

## 2021-02-09 ENCOUNTER — LAB VISIT (OUTPATIENT)
Dept: LAB | Facility: OTHER | Age: 81
End: 2021-02-09
Payer: MEDICARE

## 2021-02-09 DIAGNOSIS — Z20.822 ENCOUNTER FOR LABORATORY TESTING FOR COVID-19 VIRUS: ICD-10-CM

## 2021-02-09 PROCEDURE — U0003 INFECTIOUS AGENT DETECTION BY NUCLEIC ACID (DNA OR RNA); SEVERE ACUTE RESPIRATORY SYNDROME CORONAVIRUS 2 (SARS-COV-2) (CORONAVIRUS DISEASE [COVID-19]), AMPLIFIED PROBE TECHNIQUE, MAKING USE OF HIGH THROUGHPUT TECHNOLOGIES AS DESCRIBED BY CMS-2020-01-R: HCPCS

## 2021-02-10 LAB — SARS-COV-2 RNA RESP QL NAA+PROBE: NOT DETECTED

## 2021-02-16 ENCOUNTER — LAB VISIT (OUTPATIENT)
Dept: LAB | Facility: OTHER | Age: 81
End: 2021-02-16
Attending: INTERNAL MEDICINE
Payer: MEDICARE

## 2021-02-16 DIAGNOSIS — Z20.822 ENCOUNTER FOR LABORATORY TESTING FOR COVID-19 VIRUS: ICD-10-CM

## 2021-02-16 PROCEDURE — U0003 INFECTIOUS AGENT DETECTION BY NUCLEIC ACID (DNA OR RNA); SEVERE ACUTE RESPIRATORY SYNDROME CORONAVIRUS 2 (SARS-COV-2) (CORONAVIRUS DISEASE [COVID-19]), AMPLIFIED PROBE TECHNIQUE, MAKING USE OF HIGH THROUGHPUT TECHNOLOGIES AS DESCRIBED BY CMS-2020-01-R: HCPCS

## 2021-02-17 LAB — SARS-COV-2 RNA RESP QL NAA+PROBE: NOT DETECTED

## 2021-02-23 ENCOUNTER — LAB VISIT (OUTPATIENT)
Dept: LAB | Facility: OTHER | Age: 81
End: 2021-02-23
Payer: MEDICARE

## 2021-02-23 DIAGNOSIS — Z20.822 ENCOUNTER FOR LABORATORY TESTING FOR COVID-19 VIRUS: ICD-10-CM

## 2021-02-23 PROCEDURE — U0003 INFECTIOUS AGENT DETECTION BY NUCLEIC ACID (DNA OR RNA); SEVERE ACUTE RESPIRATORY SYNDROME CORONAVIRUS 2 (SARS-COV-2) (CORONAVIRUS DISEASE [COVID-19]), AMPLIFIED PROBE TECHNIQUE, MAKING USE OF HIGH THROUGHPUT TECHNOLOGIES AS DESCRIBED BY CMS-2020-01-R: HCPCS

## 2021-02-25 LAB — SARS-COV-2 RNA RESP QL NAA+PROBE: NOT DETECTED

## 2021-03-02 ENCOUNTER — LAB VISIT (OUTPATIENT)
Dept: LAB | Facility: OTHER | Age: 81
End: 2021-03-02
Payer: MEDICARE

## 2021-03-02 DIAGNOSIS — Z20.822 ENCOUNTER FOR LABORATORY TESTING FOR COVID-19 VIRUS: ICD-10-CM

## 2021-03-02 PROCEDURE — U0003 INFECTIOUS AGENT DETECTION BY NUCLEIC ACID (DNA OR RNA); SEVERE ACUTE RESPIRATORY SYNDROME CORONAVIRUS 2 (SARS-COV-2) (CORONAVIRUS DISEASE [COVID-19]), AMPLIFIED PROBE TECHNIQUE, MAKING USE OF HIGH THROUGHPUT TECHNOLOGIES AS DESCRIBED BY CMS-2020-01-R: HCPCS

## 2021-03-03 LAB — SARS-COV-2 RNA RESP QL NAA+PROBE: NOT DETECTED

## 2021-03-09 ENCOUNTER — LAB VISIT (OUTPATIENT)
Dept: LAB | Facility: OTHER | Age: 81
End: 2021-03-09
Payer: MEDICARE

## 2021-03-09 DIAGNOSIS — Z20.822 ENCOUNTER FOR LABORATORY TESTING FOR COVID-19 VIRUS: ICD-10-CM

## 2021-03-09 PROCEDURE — U0003 INFECTIOUS AGENT DETECTION BY NUCLEIC ACID (DNA OR RNA); SEVERE ACUTE RESPIRATORY SYNDROME CORONAVIRUS 2 (SARS-COV-2) (CORONAVIRUS DISEASE [COVID-19]), AMPLIFIED PROBE TECHNIQUE, MAKING USE OF HIGH THROUGHPUT TECHNOLOGIES AS DESCRIBED BY CMS-2020-01-R: HCPCS | Performed by: NURSE PRACTITIONER

## 2021-03-10 LAB — SARS-COV-2 RNA RESP QL NAA+PROBE: NOT DETECTED

## 2021-03-16 ENCOUNTER — LAB VISIT (OUTPATIENT)
Dept: LAB | Facility: OTHER | Age: 81
End: 2021-03-16
Payer: MEDICARE

## 2021-03-16 DIAGNOSIS — Z20.822 ENCOUNTER FOR LABORATORY TESTING FOR COVID-19 VIRUS: ICD-10-CM

## 2021-03-16 PROCEDURE — U0003 INFECTIOUS AGENT DETECTION BY NUCLEIC ACID (DNA OR RNA); SEVERE ACUTE RESPIRATORY SYNDROME CORONAVIRUS 2 (SARS-COV-2) (CORONAVIRUS DISEASE [COVID-19]), AMPLIFIED PROBE TECHNIQUE, MAKING USE OF HIGH THROUGHPUT TECHNOLOGIES AS DESCRIBED BY CMS-2020-01-R: HCPCS | Performed by: NURSE PRACTITIONER

## 2021-03-19 LAB — SARS-COV-2 RNA RESP QL NAA+PROBE: NOT DETECTED

## 2021-03-23 ENCOUNTER — LAB VISIT (OUTPATIENT)
Dept: LAB | Facility: OTHER | Age: 81
End: 2021-03-23
Payer: MEDICARE

## 2021-03-23 DIAGNOSIS — Z20.822 ENCOUNTER FOR LABORATORY TESTING FOR COVID-19 VIRUS: ICD-10-CM

## 2021-03-23 PROCEDURE — U0003 INFECTIOUS AGENT DETECTION BY NUCLEIC ACID (DNA OR RNA); SEVERE ACUTE RESPIRATORY SYNDROME CORONAVIRUS 2 (SARS-COV-2) (CORONAVIRUS DISEASE [COVID-19]), AMPLIFIED PROBE TECHNIQUE, MAKING USE OF HIGH THROUGHPUT TECHNOLOGIES AS DESCRIBED BY CMS-2020-01-R: HCPCS | Performed by: NURSE PRACTITIONER

## 2021-03-24 LAB — SARS-COV-2 RNA RESP QL NAA+PROBE: NOT DETECTED

## 2021-03-30 ENCOUNTER — LAB VISIT (OUTPATIENT)
Dept: LAB | Facility: OTHER | Age: 81
End: 2021-03-30
Payer: MEDICARE

## 2021-03-30 DIAGNOSIS — Z20.822 ENCOUNTER FOR LABORATORY TESTING FOR COVID-19 VIRUS: ICD-10-CM

## 2021-03-30 PROCEDURE — U0003 INFECTIOUS AGENT DETECTION BY NUCLEIC ACID (DNA OR RNA); SEVERE ACUTE RESPIRATORY SYNDROME CORONAVIRUS 2 (SARS-COV-2) (CORONAVIRUS DISEASE [COVID-19]), AMPLIFIED PROBE TECHNIQUE, MAKING USE OF HIGH THROUGHPUT TECHNOLOGIES AS DESCRIBED BY CMS-2020-01-R: HCPCS | Performed by: NURSE PRACTITIONER

## 2021-03-31 LAB — SARS-COV-2 RNA RESP QL NAA+PROBE: NOT DETECTED

## 2021-04-06 ENCOUNTER — LAB VISIT (OUTPATIENT)
Dept: LAB | Facility: OTHER | Age: 81
End: 2021-04-06
Payer: MEDICARE

## 2021-04-06 DIAGNOSIS — Z20.822 ENCOUNTER FOR LABORATORY TESTING FOR COVID-19 VIRUS: ICD-10-CM

## 2021-04-06 PROCEDURE — U0003 INFECTIOUS AGENT DETECTION BY NUCLEIC ACID (DNA OR RNA); SEVERE ACUTE RESPIRATORY SYNDROME CORONAVIRUS 2 (SARS-COV-2) (CORONAVIRUS DISEASE [COVID-19]), AMPLIFIED PROBE TECHNIQUE, MAKING USE OF HIGH THROUGHPUT TECHNOLOGIES AS DESCRIBED BY CMS-2020-01-R: HCPCS | Performed by: NURSE PRACTITIONER

## 2021-04-07 LAB — SARS-COV-2 RNA RESP QL NAA+PROBE: NOT DETECTED

## 2021-04-13 ENCOUNTER — LAB VISIT (OUTPATIENT)
Dept: LAB | Facility: OTHER | Age: 81
End: 2021-04-13
Payer: MEDICARE

## 2021-04-13 DIAGNOSIS — Z20.822 ENCOUNTER FOR LABORATORY TESTING FOR COVID-19 VIRUS: ICD-10-CM

## 2021-04-13 PROCEDURE — U0003 INFECTIOUS AGENT DETECTION BY NUCLEIC ACID (DNA OR RNA); SEVERE ACUTE RESPIRATORY SYNDROME CORONAVIRUS 2 (SARS-COV-2) (CORONAVIRUS DISEASE [COVID-19]), AMPLIFIED PROBE TECHNIQUE, MAKING USE OF HIGH THROUGHPUT TECHNOLOGIES AS DESCRIBED BY CMS-2020-01-R: HCPCS | Performed by: NURSE PRACTITIONER

## 2021-04-14 LAB — SARS-COV-2 RNA RESP QL NAA+PROBE: NOT DETECTED

## 2021-04-20 ENCOUNTER — LAB VISIT (OUTPATIENT)
Dept: LAB | Facility: OTHER | Age: 81
End: 2021-04-20
Payer: MEDICARE

## 2021-04-20 DIAGNOSIS — Z20.822 ENCOUNTER FOR LABORATORY TESTING FOR COVID-19 VIRUS: ICD-10-CM

## 2021-04-20 PROCEDURE — U0003 INFECTIOUS AGENT DETECTION BY NUCLEIC ACID (DNA OR RNA); SEVERE ACUTE RESPIRATORY SYNDROME CORONAVIRUS 2 (SARS-COV-2) (CORONAVIRUS DISEASE [COVID-19]), AMPLIFIED PROBE TECHNIQUE, MAKING USE OF HIGH THROUGHPUT TECHNOLOGIES AS DESCRIBED BY CMS-2020-01-R: HCPCS | Performed by: NURSE PRACTITIONER

## 2021-04-21 LAB — SARS-COV-2 RNA RESP QL NAA+PROBE: NOT DETECTED

## 2021-04-27 ENCOUNTER — LAB VISIT (OUTPATIENT)
Dept: LAB | Facility: OTHER | Age: 81
End: 2021-04-27
Payer: MEDICARE

## 2021-04-27 DIAGNOSIS — Z20.822 ENCOUNTER FOR LABORATORY TESTING FOR COVID-19 VIRUS: ICD-10-CM

## 2021-04-27 PROCEDURE — U0003 INFECTIOUS AGENT DETECTION BY NUCLEIC ACID (DNA OR RNA); SEVERE ACUTE RESPIRATORY SYNDROME CORONAVIRUS 2 (SARS-COV-2) (CORONAVIRUS DISEASE [COVID-19]), AMPLIFIED PROBE TECHNIQUE, MAKING USE OF HIGH THROUGHPUT TECHNOLOGIES AS DESCRIBED BY CMS-2020-01-R: HCPCS | Performed by: NURSE PRACTITIONER

## 2021-04-29 LAB — SARS-COV-2 RNA RESP QL NAA+PROBE: NOT DETECTED

## 2021-05-04 ENCOUNTER — LAB VISIT (OUTPATIENT)
Dept: LAB | Facility: OTHER | Age: 81
End: 2021-05-04
Payer: MEDICARE

## 2021-05-04 DIAGNOSIS — Z20.822 ENCOUNTER FOR LABORATORY TESTING FOR COVID-19 VIRUS: ICD-10-CM

## 2021-05-04 PROCEDURE — U0003 INFECTIOUS AGENT DETECTION BY NUCLEIC ACID (DNA OR RNA); SEVERE ACUTE RESPIRATORY SYNDROME CORONAVIRUS 2 (SARS-COV-2) (CORONAVIRUS DISEASE [COVID-19]), AMPLIFIED PROBE TECHNIQUE, MAKING USE OF HIGH THROUGHPUT TECHNOLOGIES AS DESCRIBED BY CMS-2020-01-R: HCPCS | Performed by: NURSE PRACTITIONER

## 2021-05-06 LAB — SARS-COV-2 RNA RESP QL NAA+PROBE: NOT DETECTED

## 2021-05-11 ENCOUNTER — LAB VISIT (OUTPATIENT)
Dept: LAB | Facility: OTHER | Age: 81
End: 2021-05-11
Payer: MEDICARE

## 2021-05-11 DIAGNOSIS — Z20.822 ENCOUNTER FOR LABORATORY TESTING FOR COVID-19 VIRUS: ICD-10-CM

## 2021-05-11 PROCEDURE — U0003 INFECTIOUS AGENT DETECTION BY NUCLEIC ACID (DNA OR RNA); SEVERE ACUTE RESPIRATORY SYNDROME CORONAVIRUS 2 (SARS-COV-2) (CORONAVIRUS DISEASE [COVID-19]), AMPLIFIED PROBE TECHNIQUE, MAKING USE OF HIGH THROUGHPUT TECHNOLOGIES AS DESCRIBED BY CMS-2020-01-R: HCPCS | Performed by: NURSE PRACTITIONER

## 2021-05-13 LAB — SARS-COV-2 RNA RESP QL NAA+PROBE: NOT DETECTED

## 2021-05-18 ENCOUNTER — LAB VISIT (OUTPATIENT)
Dept: LAB | Facility: OTHER | Age: 81
End: 2021-05-18
Payer: MEDICARE

## 2021-05-18 DIAGNOSIS — Z20.822 ENCOUNTER FOR LABORATORY TESTING FOR COVID-19 VIRUS: ICD-10-CM

## 2021-05-18 PROCEDURE — U0003 INFECTIOUS AGENT DETECTION BY NUCLEIC ACID (DNA OR RNA); SEVERE ACUTE RESPIRATORY SYNDROME CORONAVIRUS 2 (SARS-COV-2) (CORONAVIRUS DISEASE [COVID-19]), AMPLIFIED PROBE TECHNIQUE, MAKING USE OF HIGH THROUGHPUT TECHNOLOGIES AS DESCRIBED BY CMS-2020-01-R: HCPCS | Performed by: NURSE PRACTITIONER

## 2021-05-19 LAB — SARS-COV-2 RNA RESP QL NAA+PROBE: NOT DETECTED

## 2021-05-25 ENCOUNTER — LAB VISIT (OUTPATIENT)
Dept: LAB | Facility: OTHER | Age: 81
End: 2021-05-25
Payer: MEDICARE

## 2021-05-25 DIAGNOSIS — Z20.822 ENCOUNTER FOR LABORATORY TESTING FOR COVID-19 VIRUS: ICD-10-CM

## 2021-05-25 PROCEDURE — U0003 INFECTIOUS AGENT DETECTION BY NUCLEIC ACID (DNA OR RNA); SEVERE ACUTE RESPIRATORY SYNDROME CORONAVIRUS 2 (SARS-COV-2) (CORONAVIRUS DISEASE [COVID-19]), AMPLIFIED PROBE TECHNIQUE, MAKING USE OF HIGH THROUGHPUT TECHNOLOGIES AS DESCRIBED BY CMS-2020-01-R: HCPCS | Performed by: NURSE PRACTITIONER

## 2021-05-26 LAB — SARS-COV-2 RNA RESP QL NAA+PROBE: NOT DETECTED

## 2021-06-01 ENCOUNTER — LAB VISIT (OUTPATIENT)
Dept: LAB | Facility: OTHER | Age: 81
End: 2021-06-01
Payer: MEDICARE

## 2021-06-01 DIAGNOSIS — Z20.822 ENCOUNTER FOR LABORATORY TESTING FOR COVID-19 VIRUS: ICD-10-CM

## 2021-06-01 PROCEDURE — U0003 INFECTIOUS AGENT DETECTION BY NUCLEIC ACID (DNA OR RNA); SEVERE ACUTE RESPIRATORY SYNDROME CORONAVIRUS 2 (SARS-COV-2) (CORONAVIRUS DISEASE [COVID-19]), AMPLIFIED PROBE TECHNIQUE, MAKING USE OF HIGH THROUGHPUT TECHNOLOGIES AS DESCRIBED BY CMS-2020-01-R: HCPCS | Performed by: NURSE PRACTITIONER

## 2021-06-02 LAB — SARS-COV-2 RNA RESP QL NAA+PROBE: NOT DETECTED

## 2021-06-08 ENCOUNTER — LAB VISIT (OUTPATIENT)
Dept: LAB | Facility: OTHER | Age: 81
End: 2021-06-08
Payer: MEDICARE

## 2021-06-08 DIAGNOSIS — Z20.822 ENCOUNTER FOR LABORATORY TESTING FOR COVID-19 VIRUS: ICD-10-CM

## 2021-06-08 PROCEDURE — U0003 INFECTIOUS AGENT DETECTION BY NUCLEIC ACID (DNA OR RNA); SEVERE ACUTE RESPIRATORY SYNDROME CORONAVIRUS 2 (SARS-COV-2) (CORONAVIRUS DISEASE [COVID-19]), AMPLIFIED PROBE TECHNIQUE, MAKING USE OF HIGH THROUGHPUT TECHNOLOGIES AS DESCRIBED BY CMS-2020-01-R: HCPCS | Performed by: NURSE PRACTITIONER

## 2021-06-09 LAB — SARS-COV-2 RNA RESP QL NAA+PROBE: NOT DETECTED

## 2021-06-15 ENCOUNTER — LAB VISIT (OUTPATIENT)
Dept: LAB | Facility: OTHER | Age: 81
End: 2021-06-15
Payer: MEDICARE

## 2021-06-15 DIAGNOSIS — Z20.822 ENCOUNTER FOR LABORATORY TESTING FOR COVID-19 VIRUS: ICD-10-CM

## 2021-06-15 PROCEDURE — U0003 INFECTIOUS AGENT DETECTION BY NUCLEIC ACID (DNA OR RNA); SEVERE ACUTE RESPIRATORY SYNDROME CORONAVIRUS 2 (SARS-COV-2) (CORONAVIRUS DISEASE [COVID-19]), AMPLIFIED PROBE TECHNIQUE, MAKING USE OF HIGH THROUGHPUT TECHNOLOGIES AS DESCRIBED BY CMS-2020-01-R: HCPCS | Performed by: NURSE PRACTITIONER

## 2021-06-16 LAB — SARS-COV-2 RNA RESP QL NAA+PROBE: NOT DETECTED

## 2021-06-22 ENCOUNTER — LAB VISIT (OUTPATIENT)
Dept: LAB | Facility: OTHER | Age: 81
End: 2021-06-22
Payer: MEDICARE

## 2021-06-22 DIAGNOSIS — Z20.822 ENCOUNTER FOR LABORATORY TESTING FOR COVID-19 VIRUS: ICD-10-CM

## 2021-06-22 PROCEDURE — U0003 INFECTIOUS AGENT DETECTION BY NUCLEIC ACID (DNA OR RNA); SEVERE ACUTE RESPIRATORY SYNDROME CORONAVIRUS 2 (SARS-COV-2) (CORONAVIRUS DISEASE [COVID-19]), AMPLIFIED PROBE TECHNIQUE, MAKING USE OF HIGH THROUGHPUT TECHNOLOGIES AS DESCRIBED BY CMS-2020-01-R: HCPCS | Performed by: NURSE PRACTITIONER

## 2021-06-23 LAB — SARS-COV-2 RNA RESP QL NAA+PROBE: NOT DETECTED

## 2021-06-29 ENCOUNTER — LAB VISIT (OUTPATIENT)
Dept: LAB | Facility: OTHER | Age: 81
End: 2021-06-29
Payer: MEDICARE

## 2021-06-29 DIAGNOSIS — Z20.822 ENCOUNTER FOR LABORATORY TESTING FOR COVID-19 VIRUS: ICD-10-CM

## 2021-06-29 PROCEDURE — U0003 INFECTIOUS AGENT DETECTION BY NUCLEIC ACID (DNA OR RNA); SEVERE ACUTE RESPIRATORY SYNDROME CORONAVIRUS 2 (SARS-COV-2) (CORONAVIRUS DISEASE [COVID-19]), AMPLIFIED PROBE TECHNIQUE, MAKING USE OF HIGH THROUGHPUT TECHNOLOGIES AS DESCRIBED BY CMS-2020-01-R: HCPCS | Performed by: NURSE PRACTITIONER

## 2021-06-30 LAB — SARS-COV-2 RNA RESP QL NAA+PROBE: NOT DETECTED

## 2021-07-06 ENCOUNTER — LAB VISIT (OUTPATIENT)
Dept: LAB | Facility: OTHER | Age: 81
End: 2021-07-06
Payer: MEDICARE

## 2021-07-06 DIAGNOSIS — Z20.822 ENCOUNTER FOR LABORATORY TESTING FOR COVID-19 VIRUS: ICD-10-CM

## 2021-07-06 PROCEDURE — U0003 INFECTIOUS AGENT DETECTION BY NUCLEIC ACID (DNA OR RNA); SEVERE ACUTE RESPIRATORY SYNDROME CORONAVIRUS 2 (SARS-COV-2) (CORONAVIRUS DISEASE [COVID-19]), AMPLIFIED PROBE TECHNIQUE, MAKING USE OF HIGH THROUGHPUT TECHNOLOGIES AS DESCRIBED BY CMS-2020-01-R: HCPCS | Performed by: NURSE PRACTITIONER

## 2021-07-07 LAB
SARS-COV-2 RNA RESP QL NAA+PROBE: NOT DETECTED
SARS-COV-2- CYCLE NUMBER: -1

## 2021-07-13 ENCOUNTER — LAB VISIT (OUTPATIENT)
Dept: LAB | Facility: OTHER | Age: 81
End: 2021-07-13
Payer: MEDICARE

## 2021-07-13 DIAGNOSIS — Z20.822 ENCOUNTER FOR LABORATORY TESTING FOR COVID-19 VIRUS: ICD-10-CM

## 2021-07-13 PROCEDURE — U0003 INFECTIOUS AGENT DETECTION BY NUCLEIC ACID (DNA OR RNA); SEVERE ACUTE RESPIRATORY SYNDROME CORONAVIRUS 2 (SARS-COV-2) (CORONAVIRUS DISEASE [COVID-19]), AMPLIFIED PROBE TECHNIQUE, MAKING USE OF HIGH THROUGHPUT TECHNOLOGIES AS DESCRIBED BY CMS-2020-01-R: HCPCS | Performed by: NURSE PRACTITIONER

## 2021-07-14 LAB
SARS-COV-2 RNA RESP QL NAA+PROBE: NOT DETECTED
SARS-COV-2- CYCLE NUMBER: -1

## 2021-07-20 ENCOUNTER — LAB VISIT (OUTPATIENT)
Dept: LAB | Facility: OTHER | Age: 81
End: 2021-07-20
Payer: MEDICARE

## 2021-07-20 DIAGNOSIS — Z20.822 ENCOUNTER FOR LABORATORY TESTING FOR COVID-19 VIRUS: ICD-10-CM

## 2021-07-20 PROCEDURE — U0003 INFECTIOUS AGENT DETECTION BY NUCLEIC ACID (DNA OR RNA); SEVERE ACUTE RESPIRATORY SYNDROME CORONAVIRUS 2 (SARS-COV-2) (CORONAVIRUS DISEASE [COVID-19]), AMPLIFIED PROBE TECHNIQUE, MAKING USE OF HIGH THROUGHPUT TECHNOLOGIES AS DESCRIBED BY CMS-2020-01-R: HCPCS | Performed by: NURSE PRACTITIONER

## 2021-07-21 LAB
SARS-COV-2 RNA RESP QL NAA+PROBE: NOT DETECTED
SARS-COV-2- CYCLE NUMBER: -1

## 2021-07-27 ENCOUNTER — LAB VISIT (OUTPATIENT)
Dept: LAB | Facility: OTHER | Age: 81
End: 2021-07-27
Payer: MEDICARE

## 2021-07-27 DIAGNOSIS — Z20.822 ENCOUNTER FOR LABORATORY TESTING FOR COVID-19 VIRUS: ICD-10-CM

## 2021-07-27 PROCEDURE — U0003 INFECTIOUS AGENT DETECTION BY NUCLEIC ACID (DNA OR RNA); SEVERE ACUTE RESPIRATORY SYNDROME CORONAVIRUS 2 (SARS-COV-2) (CORONAVIRUS DISEASE [COVID-19]), AMPLIFIED PROBE TECHNIQUE, MAKING USE OF HIGH THROUGHPUT TECHNOLOGIES AS DESCRIBED BY CMS-2020-01-R: HCPCS | Performed by: NURSE PRACTITIONER

## 2021-07-29 LAB
SARS-COV-2 RNA RESP QL NAA+PROBE: NOT DETECTED
SARS-COV-2- CYCLE NUMBER: -1

## 2021-08-03 ENCOUNTER — LAB VISIT (OUTPATIENT)
Dept: LAB | Facility: OTHER | Age: 81
End: 2021-08-03
Payer: MEDICARE

## 2021-08-03 DIAGNOSIS — Z20.822 ENCOUNTER FOR LABORATORY TESTING FOR COVID-19 VIRUS: ICD-10-CM

## 2021-08-03 PROCEDURE — U0003 INFECTIOUS AGENT DETECTION BY NUCLEIC ACID (DNA OR RNA); SEVERE ACUTE RESPIRATORY SYNDROME CORONAVIRUS 2 (SARS-COV-2) (CORONAVIRUS DISEASE [COVID-19]), AMPLIFIED PROBE TECHNIQUE, MAKING USE OF HIGH THROUGHPUT TECHNOLOGIES AS DESCRIBED BY CMS-2020-01-R: HCPCS | Performed by: NURSE PRACTITIONER

## 2021-08-04 LAB
SARS-COV-2 RNA RESP QL NAA+PROBE: NOT DETECTED
SARS-COV-2- CYCLE NUMBER: -1

## 2021-08-10 ENCOUNTER — LAB VISIT (OUTPATIENT)
Dept: LAB | Facility: OTHER | Age: 81
End: 2021-08-10
Payer: MEDICARE

## 2021-08-10 DIAGNOSIS — Z20.822 ENCOUNTER FOR LABORATORY TESTING FOR COVID-19 VIRUS: ICD-10-CM

## 2021-08-10 PROCEDURE — U0003 INFECTIOUS AGENT DETECTION BY NUCLEIC ACID (DNA OR RNA); SEVERE ACUTE RESPIRATORY SYNDROME CORONAVIRUS 2 (SARS-COV-2) (CORONAVIRUS DISEASE [COVID-19]), AMPLIFIED PROBE TECHNIQUE, MAKING USE OF HIGH THROUGHPUT TECHNOLOGIES AS DESCRIBED BY CMS-2020-01-R: HCPCS | Performed by: NURSE PRACTITIONER

## 2021-08-12 LAB
SARS-COV-2 RNA RESP QL NAA+PROBE: NOT DETECTED
SARS-COV-2- CYCLE NUMBER: -1

## 2021-08-17 ENCOUNTER — LAB VISIT (OUTPATIENT)
Dept: LAB | Facility: OTHER | Age: 81
End: 2021-08-17
Payer: MEDICARE

## 2021-08-17 DIAGNOSIS — Z20.822 ENCOUNTER FOR LABORATORY TESTING FOR COVID-19 VIRUS: ICD-10-CM

## 2021-08-17 PROCEDURE — U0003 INFECTIOUS AGENT DETECTION BY NUCLEIC ACID (DNA OR RNA); SEVERE ACUTE RESPIRATORY SYNDROME CORONAVIRUS 2 (SARS-COV-2) (CORONAVIRUS DISEASE [COVID-19]), AMPLIFIED PROBE TECHNIQUE, MAKING USE OF HIGH THROUGHPUT TECHNOLOGIES AS DESCRIBED BY CMS-2020-01-R: HCPCS | Performed by: NURSE PRACTITIONER

## 2021-08-19 LAB
SARS-COV-2 RNA RESP QL NAA+PROBE: NOT DETECTED
SARS-COV-2- CYCLE NUMBER: -1

## 2021-08-21 ENCOUNTER — HOSPITAL ENCOUNTER (EMERGENCY)
Facility: HOSPITAL | Age: 81
Discharge: HOME | End: 2021-08-22
Attending: EMERGENCY MEDICINE
Payer: MEDICARE

## 2021-08-21 DIAGNOSIS — R41.82 AMS (ALTERED MENTAL STATUS): ICD-10-CM

## 2021-08-21 DIAGNOSIS — F03.91 DEMENTIA WITH BEHAVIORAL DISTURBANCE, UNSPECIFIED DEMENTIA TYPE: ICD-10-CM

## 2021-08-21 DIAGNOSIS — F39 MOOD DISORDER: Primary | ICD-10-CM

## 2021-08-21 LAB
ALBUMIN SERPL BCP-MCNC: 3.8 G/DL (ref 3.5–5.2)
ALP SERPL-CCNC: 88 U/L (ref 55–135)
ALT SERPL W/O P-5'-P-CCNC: 11 U/L (ref 10–44)
AMMONIA PLAS-SCNC: 25 UMOL/L (ref 10–50)
AMPHET+METHAMPHET UR QL: NEGATIVE
ANION GAP SERPL CALC-SCNC: 9 MMOL/L (ref 8–16)
AST SERPL-CCNC: 21 U/L (ref 10–40)
BARBITURATES UR QL SCN>200 NG/ML: NEGATIVE
BASOPHILS # BLD AUTO: 0.01 K/UL (ref 0–0.2)
BASOPHILS NFR BLD: 0.2 % (ref 0–1.9)
BENZODIAZ UR QL SCN>200 NG/ML: NEGATIVE
BILIRUB SERPL-MCNC: 1.2 MG/DL (ref 0.1–1)
BILIRUB UR QL STRIP: NEGATIVE
BUN SERPL-MCNC: 19 MG/DL (ref 8–23)
BZE UR QL SCN: NEGATIVE
CALCIUM SERPL-MCNC: 10 MG/DL (ref 8.7–10.5)
CANNABINOIDS UR QL SCN: NEGATIVE
CHLORIDE SERPL-SCNC: 105 MMOL/L (ref 95–110)
CLARITY UR: CLEAR
CO2 SERPL-SCNC: 28 MMOL/L (ref 23–29)
COLOR UR: YELLOW
CREAT SERPL-MCNC: 1.2 MG/DL (ref 0.5–1.4)
CREAT UR-MCNC: 126.7 MG/DL (ref 23–375)
CTP QC/QA: YES
DIFFERENTIAL METHOD: ABNORMAL
EOSINOPHIL # BLD AUTO: 0 K/UL (ref 0–0.5)
EOSINOPHIL NFR BLD: 0.6 % (ref 0–8)
ERYTHROCYTE [DISTWIDTH] IN BLOOD BY AUTOMATED COUNT: 13.6 % (ref 11.5–14.5)
EST. GFR  (AFRICAN AMERICAN): >60 ML/MIN/1.73 M^2
EST. GFR  (NON AFRICAN AMERICAN): 56 ML/MIN/1.73 M^2
GLUCOSE SERPL-MCNC: 105 MG/DL (ref 70–110)
GLUCOSE UR QL STRIP: NEGATIVE
HCT VFR BLD AUTO: 37.8 % (ref 40–54)
HGB BLD-MCNC: 12.6 G/DL (ref 14–18)
HGB UR QL STRIP: NEGATIVE
IMM GRANULOCYTES # BLD AUTO: 0.01 K/UL (ref 0–0.04)
IMM GRANULOCYTES NFR BLD AUTO: 0.2 % (ref 0–0.5)
KETONES UR QL STRIP: NEGATIVE
LEUKOCYTE ESTERASE UR QL STRIP: NEGATIVE
LYMPHOCYTES # BLD AUTO: 1 K/UL (ref 1–4.8)
LYMPHOCYTES NFR BLD: 21 % (ref 18–48)
MCH RBC QN AUTO: 27 PG (ref 27–31)
MCHC RBC AUTO-ENTMCNC: 33.3 G/DL (ref 32–36)
MCV RBC AUTO: 81 FL (ref 82–98)
METHADONE UR QL SCN>300 NG/ML: NEGATIVE
MONOCYTES # BLD AUTO: 0.5 K/UL (ref 0.3–1)
MONOCYTES NFR BLD: 9.9 % (ref 4–15)
NEUTROPHILS # BLD AUTO: 3.2 K/UL (ref 1.8–7.7)
NEUTROPHILS NFR BLD: 68.1 % (ref 38–73)
NITRITE UR QL STRIP: NEGATIVE
NRBC BLD-RTO: 0 /100 WBC
OPIATES UR QL SCN: NEGATIVE
PCP UR QL SCN>25 NG/ML: NEGATIVE
PH UR STRIP: 6 [PH] (ref 5–8)
PLATELET # BLD AUTO: 187 K/UL (ref 150–450)
PMV BLD AUTO: 9 FL (ref 9.2–12.9)
POTASSIUM SERPL-SCNC: 4.4 MMOL/L (ref 3.5–5.1)
PROT SERPL-MCNC: 8.4 G/DL (ref 6–8.4)
PROT UR QL STRIP: NEGATIVE
RBC # BLD AUTO: 4.67 M/UL (ref 4.6–6.2)
SARS-COV-2 RDRP RESP QL NAA+PROBE: NEGATIVE
SODIUM SERPL-SCNC: 142 MMOL/L (ref 136–145)
SP GR UR STRIP: 1.01 (ref 1–1.03)
TOXICOLOGY INFORMATION: NORMAL
TSH SERPL DL<=0.005 MIU/L-ACNC: 1.28 UIU/ML (ref 0.4–4)
URN SPEC COLLECT METH UR: NORMAL
UROBILINOGEN UR STRIP-ACNC: NEGATIVE EU/DL
WBC # BLD AUTO: 4.66 K/UL (ref 3.9–12.7)

## 2021-08-21 PROCEDURE — U0002 COVID-19 LAB TEST NON-CDC: HCPCS | Performed by: EMERGENCY MEDICINE

## 2021-08-21 PROCEDURE — G0426 PR INPT TELEHEALTH CONSULT 50M: ICD-10-PCS | Mod: 95,,, | Performed by: PSYCHIATRY & NEUROLOGY

## 2021-08-21 PROCEDURE — 93010 ELECTROCARDIOGRAM REPORT: CPT | Mod: ,,, | Performed by: INTERNAL MEDICINE

## 2021-08-21 PROCEDURE — 82140 ASSAY OF AMMONIA: CPT | Performed by: EMERGENCY MEDICINE

## 2021-08-21 PROCEDURE — 96360 HYDRATION IV INFUSION INIT: CPT

## 2021-08-21 PROCEDURE — 84443 ASSAY THYROID STIM HORMONE: CPT | Performed by: EMERGENCY MEDICINE

## 2021-08-21 PROCEDURE — G0426 INPT/ED TELECONSULT50: HCPCS | Mod: 95,,, | Performed by: PSYCHIATRY & NEUROLOGY

## 2021-08-21 PROCEDURE — 93010 EKG 12-LEAD: ICD-10-PCS | Mod: ,,, | Performed by: INTERNAL MEDICINE

## 2021-08-21 PROCEDURE — 80053 COMPREHEN METABOLIC PANEL: CPT | Performed by: EMERGENCY MEDICINE

## 2021-08-21 PROCEDURE — 80307 DRUG TEST PRSMV CHEM ANLYZR: CPT | Performed by: EMERGENCY MEDICINE

## 2021-08-21 PROCEDURE — 25000003 PHARM REV CODE 250: Performed by: EMERGENCY MEDICINE

## 2021-08-21 PROCEDURE — 81003 URINALYSIS AUTO W/O SCOPE: CPT | Performed by: EMERGENCY MEDICINE

## 2021-08-21 PROCEDURE — 93005 ELECTROCARDIOGRAM TRACING: CPT

## 2021-08-21 PROCEDURE — 99285 EMERGENCY DEPT VISIT HI MDM: CPT | Mod: 25

## 2021-08-21 PROCEDURE — 96361 HYDRATE IV INFUSION ADD-ON: CPT

## 2021-08-21 PROCEDURE — 85025 COMPLETE CBC W/AUTO DIFF WBC: CPT | Performed by: EMERGENCY MEDICINE

## 2021-08-21 RX ORDER — ARIPIPRAZOLE 5 MG/1
5 TABLET ORAL DAILY
COMMUNITY
End: 2022-08-22

## 2021-08-21 RX ORDER — LEVOCETIRIZINE DIHYDROCHLORIDE 5 MG/1
5 TABLET, FILM COATED ORAL NIGHTLY
COMMUNITY
End: 2022-08-22

## 2021-08-21 RX ORDER — LORAZEPAM 0.5 MG/1
0.5 TABLET ORAL
Status: COMPLETED | OUTPATIENT
Start: 2021-08-21 | End: 2021-08-21

## 2021-08-21 RX ORDER — RISPERIDONE 0.5 MG/1
0.5 TABLET ORAL
Status: DISCONTINUED | OUTPATIENT
Start: 2021-08-21 | End: 2021-08-22 | Stop reason: HOSPADM

## 2021-08-21 RX ADMIN — SODIUM CHLORIDE 1000 ML: 0.9 INJECTION, SOLUTION INTRAVENOUS at 03:08

## 2021-08-21 RX ADMIN — LORAZEPAM 0.5 MG: 0.5 TABLET ORAL at 09:08

## 2021-08-22 VITALS
HEART RATE: 85 BPM | WEIGHT: 160 LBS | TEMPERATURE: 98 F | OXYGEN SATURATION: 97 % | BODY MASS INDEX: 22.4 KG/M2 | SYSTOLIC BLOOD PRESSURE: 151 MMHG | DIASTOLIC BLOOD PRESSURE: 77 MMHG | HEIGHT: 71 IN | RESPIRATION RATE: 19 BRPM

## 2021-12-27 ENCOUNTER — LAB VISIT (OUTPATIENT)
Dept: LAB | Facility: OTHER | Age: 81
End: 2021-12-27
Payer: MEDICARE

## 2021-12-27 DIAGNOSIS — Z20.822 ENCOUNTER FOR LABORATORY TESTING FOR COVID-19 VIRUS: ICD-10-CM

## 2021-12-27 PROCEDURE — U0003 INFECTIOUS AGENT DETECTION BY NUCLEIC ACID (DNA OR RNA); SEVERE ACUTE RESPIRATORY SYNDROME CORONAVIRUS 2 (SARS-COV-2) (CORONAVIRUS DISEASE [COVID-19]), AMPLIFIED PROBE TECHNIQUE, MAKING USE OF HIGH THROUGHPUT TECHNOLOGIES AS DESCRIBED BY CMS-2020-01-R: HCPCS | Performed by: NURSE PRACTITIONER

## 2021-12-28 LAB
SARS-COV-2 RNA RESP QL NAA+PROBE: NOT DETECTED
SARS-COV-2- CYCLE NUMBER: NORMAL

## 2022-01-03 ENCOUNTER — LAB VISIT (OUTPATIENT)
Dept: LAB | Facility: OTHER | Age: 82
End: 2022-01-03
Payer: MEDICARE

## 2022-01-03 DIAGNOSIS — Z20.822 ENCOUNTER FOR LABORATORY TESTING FOR COVID-19 VIRUS: ICD-10-CM

## 2022-01-03 PROCEDURE — U0003 INFECTIOUS AGENT DETECTION BY NUCLEIC ACID (DNA OR RNA); SEVERE ACUTE RESPIRATORY SYNDROME CORONAVIRUS 2 (SARS-COV-2) (CORONAVIRUS DISEASE [COVID-19]), AMPLIFIED PROBE TECHNIQUE, MAKING USE OF HIGH THROUGHPUT TECHNOLOGIES AS DESCRIBED BY CMS-2020-01-R: HCPCS | Performed by: NURSE PRACTITIONER

## 2022-01-06 LAB — SARS-COV-2 RNA RESP QL NAA+PROBE: NOT DETECTED

## 2022-01-10 ENCOUNTER — LAB VISIT (OUTPATIENT)
Dept: LAB | Facility: OTHER | Age: 82
End: 2022-01-10
Payer: MEDICARE

## 2022-01-10 DIAGNOSIS — Z20.822 ENCOUNTER FOR LABORATORY TESTING FOR COVID-19 VIRUS: ICD-10-CM

## 2022-01-10 PROCEDURE — U0003 INFECTIOUS AGENT DETECTION BY NUCLEIC ACID (DNA OR RNA); SEVERE ACUTE RESPIRATORY SYNDROME CORONAVIRUS 2 (SARS-COV-2) (CORONAVIRUS DISEASE [COVID-19]), AMPLIFIED PROBE TECHNIQUE, MAKING USE OF HIGH THROUGHPUT TECHNOLOGIES AS DESCRIBED BY CMS-2020-01-R: HCPCS | Performed by: NURSE PRACTITIONER

## 2022-01-11 DIAGNOSIS — U07.1 COVID-19 VIRUS DETECTED: ICD-10-CM

## 2022-01-11 LAB
SARS-COV-2 RNA RESP QL NAA+PROBE: DETECTED
SARS-COV-2- CYCLE NUMBER: 38

## 2022-03-02 ENCOUNTER — TELEPHONE (OUTPATIENT)
Dept: UROLOGY | Facility: CLINIC | Age: 82
End: 2022-03-02
Payer: MEDICARE

## 2022-03-02 NOTE — TELEPHONE ENCOUNTER
Spoke with Abdirahman Mclaughlin she would like for her number to be remove from patient contact list, and to call his daughter.Spoke with daughter(Hallie) patient is in [facility]. [phone]  Call [facility] ans was place on hold for 15 minutes, hung up call and call back again, spoke with Joshua and reschedule patient  appointment to 03/08/22 at 10:30 am.

## 2022-04-20 ENCOUNTER — HOSPITAL ENCOUNTER (EMERGENCY)
Facility: HOSPITAL | Age: 82
Discharge: HOME | End: 2022-04-21
Attending: EMERGENCY MEDICINE
Payer: MEDICARE

## 2022-04-20 DIAGNOSIS — R53.1 WEAKNESS: ICD-10-CM

## 2022-04-20 DIAGNOSIS — R10.9 ABDOMINAL PAIN, UNSPECIFIED ABDOMINAL LOCATION: ICD-10-CM

## 2022-04-20 DIAGNOSIS — E86.0 DEHYDRATION: Primary | ICD-10-CM

## 2022-04-20 LAB
ALBUMIN SERPL BCP-MCNC: 3.5 G/DL (ref 3.5–5.2)
ALP SERPL-CCNC: 93 U/L (ref 55–135)
ALT SERPL W/O P-5'-P-CCNC: 14 U/L (ref 10–44)
ANION GAP SERPL CALC-SCNC: 11 MMOL/L (ref 8–16)
AST SERPL-CCNC: 50 U/L (ref 10–40)
BASOPHILS # BLD AUTO: 0 K/UL (ref 0–0.2)
BASOPHILS NFR BLD: 0 % (ref 0–1.9)
BILIRUB SERPL-MCNC: 1.2 MG/DL (ref 0.1–1)
BUN SERPL-MCNC: 35 MG/DL (ref 8–23)
CALCIUM SERPL-MCNC: 9.7 MG/DL (ref 8.7–10.5)
CHLORIDE SERPL-SCNC: 108 MMOL/L (ref 95–110)
CO2 SERPL-SCNC: 24 MMOL/L (ref 23–29)
CREAT SERPL-MCNC: 1.3 MG/DL (ref 0.5–1.4)
DIFFERENTIAL METHOD: ABNORMAL
EOSINOPHIL # BLD AUTO: 0 K/UL (ref 0–0.5)
EOSINOPHIL NFR BLD: 0.2 % (ref 0–8)
ERYTHROCYTE [DISTWIDTH] IN BLOOD BY AUTOMATED COUNT: 13.5 % (ref 11.5–14.5)
EST. GFR  (AFRICAN AMERICAN): 58.7 ML/MIN/1.73 M^2
EST. GFR  (NON AFRICAN AMERICAN): 50.8 ML/MIN/1.73 M^2
GLUCOSE SERPL-MCNC: 123 MG/DL (ref 70–110)
HCT VFR BLD AUTO: 37.1 % (ref 40–54)
HGB BLD-MCNC: 11.7 G/DL (ref 14–18)
IMM GRANULOCYTES # BLD AUTO: 0.01 K/UL (ref 0–0.04)
IMM GRANULOCYTES NFR BLD AUTO: 0.2 % (ref 0–0.5)
LIPASE SERPL-CCNC: 14 U/L (ref 4–60)
LYMPHOCYTES # BLD AUTO: 0.6 K/UL (ref 1–4.8)
LYMPHOCYTES NFR BLD: 9.7 % (ref 18–48)
MAGNESIUM SERPL-MCNC: 1.9 MG/DL (ref 1.6–2.6)
MCH RBC QN AUTO: 26.5 PG (ref 27–31)
MCHC RBC AUTO-ENTMCNC: 31.5 G/DL (ref 32–36)
MCV RBC AUTO: 84 FL (ref 82–98)
MONOCYTES # BLD AUTO: 0.7 K/UL (ref 0.3–1)
MONOCYTES NFR BLD: 11.3 % (ref 4–15)
NEUTROPHILS # BLD AUTO: 4.8 K/UL (ref 1.8–7.7)
NEUTROPHILS NFR BLD: 78.6 % (ref 38–73)
NRBC BLD-RTO: 0 /100 WBC
PHOSPHATE SERPL-MCNC: 4.1 MG/DL (ref 2.7–4.5)
PLATELET # BLD AUTO: 229 K/UL (ref 150–450)
PMV BLD AUTO: 9.1 FL (ref 9.2–12.9)
POTASSIUM SERPL-SCNC: 4.6 MMOL/L (ref 3.5–5.1)
PROT SERPL-MCNC: 7.7 G/DL (ref 6–8.4)
RBC # BLD AUTO: 4.41 M/UL (ref 4.6–6.2)
SODIUM SERPL-SCNC: 143 MMOL/L (ref 136–145)
TROPONIN I SERPL DL<=0.01 NG/ML-MCNC: 0.02 NG/ML (ref 0–0.03)
TSH SERPL DL<=0.005 MIU/L-ACNC: 1.22 UIU/ML (ref 0.4–4)
WBC # BLD AUTO: 6.11 K/UL (ref 3.9–12.7)

## 2022-04-20 PROCEDURE — 93010 EKG 12-LEAD: ICD-10-PCS | Mod: ,,, | Performed by: INTERNAL MEDICINE

## 2022-04-20 PROCEDURE — 83735 ASSAY OF MAGNESIUM: CPT | Performed by: EMERGENCY MEDICINE

## 2022-04-20 PROCEDURE — 63600175 PHARM REV CODE 636 W HCPCS

## 2022-04-20 PROCEDURE — 99284 EMERGENCY DEPT VISIT MOD MDM: CPT | Mod: GC,,, | Performed by: EMERGENCY MEDICINE

## 2022-04-20 PROCEDURE — 84484 ASSAY OF TROPONIN QUANT: CPT | Performed by: EMERGENCY MEDICINE

## 2022-04-20 PROCEDURE — 85025 COMPLETE CBC W/AUTO DIFF WBC: CPT

## 2022-04-20 PROCEDURE — 84100 ASSAY OF PHOSPHORUS: CPT | Performed by: EMERGENCY MEDICINE

## 2022-04-20 PROCEDURE — 93010 ELECTROCARDIOGRAM REPORT: CPT | Mod: ,,, | Performed by: INTERNAL MEDICINE

## 2022-04-20 PROCEDURE — 83605 ASSAY OF LACTIC ACID: CPT | Performed by: EMERGENCY MEDICINE

## 2022-04-20 PROCEDURE — 83690 ASSAY OF LIPASE: CPT | Performed by: EMERGENCY MEDICINE

## 2022-04-20 PROCEDURE — 93005 ELECTROCARDIOGRAM TRACING: CPT

## 2022-04-20 PROCEDURE — 99284 PR EMERGENCY DEPT VISIT,LEVEL IV: ICD-10-PCS | Mod: GC,,, | Performed by: EMERGENCY MEDICINE

## 2022-04-20 PROCEDURE — 99285 EMERGENCY DEPT VISIT HI MDM: CPT | Mod: 25

## 2022-04-20 PROCEDURE — 84443 ASSAY THYROID STIM HORMONE: CPT | Performed by: EMERGENCY MEDICINE

## 2022-04-20 PROCEDURE — 80053 COMPREHEN METABOLIC PANEL: CPT

## 2022-04-20 PROCEDURE — 96360 HYDRATION IV INFUSION INIT: CPT

## 2022-04-20 RX ADMIN — SODIUM CHLORIDE, SODIUM LACTATE, POTASSIUM CHLORIDE, AND CALCIUM CHLORIDE 1000 ML: .6; .31; .03; .02 INJECTION, SOLUTION INTRAVENOUS at 11:04

## 2022-04-21 VITALS
TEMPERATURE: 98 F | HEART RATE: 90 BPM | RESPIRATION RATE: 16 BRPM | DIASTOLIC BLOOD PRESSURE: 64 MMHG | OXYGEN SATURATION: 98 % | SYSTOLIC BLOOD PRESSURE: 145 MMHG

## 2022-04-21 LAB
BACTERIA #/AREA URNS AUTO: NORMAL /HPF
BILIRUB UR QL STRIP: NEGATIVE
CLARITY UR REFRACT.AUTO: CLEAR
COLOR UR AUTO: YELLOW
GLUCOSE UR QL STRIP: NEGATIVE
HGB UR QL STRIP: ABNORMAL
KETONES UR QL STRIP: NEGATIVE
LACTATE SERPL-SCNC: 1.8 MMOL/L (ref 0.5–2.2)
LEUKOCYTE ESTERASE UR QL STRIP: NEGATIVE
MICROSCOPIC COMMENT: NORMAL
NITRITE UR QL STRIP: NEGATIVE
PH UR STRIP: 5 [PH] (ref 5–8)
PROT UR QL STRIP: NEGATIVE
RBC #/AREA URNS AUTO: 2 /HPF (ref 0–4)
SP GR UR STRIP: 1.01 (ref 1–1.03)
SQUAMOUS #/AREA URNS AUTO: 1 /HPF
URN SPEC COLLECT METH UR: ABNORMAL
WBC #/AREA URNS AUTO: 0 /HPF (ref 0–5)

## 2022-04-21 PROCEDURE — 81001 URINALYSIS AUTO W/SCOPE: CPT

## 2022-04-21 NOTE — PROVIDER PROGRESS NOTES - EMERGENCY DEPT.
Encounter Date: 4/20/2022    ED Physician Progress Notes        Physician Note:   I have assumed care for this patient from FARTUN Hernandez.  I have discussed care with the previous attending. At time of sign out blood work pending. Pt appears dehydrated. Pt hydrated with 2L. Blood work unremarkable. CTh no acute abnormality. UA negative. Pt re-examined at thisi time he has no complaints. Denies abd pain. Answers questions.  Attempted multiple time to contact nurse caring for pt at NH to get more information unsuccessfully. Given pt without complaints and unremarkable blood work other than slightly elevated BUN pt hydrated in ED. NO indication for admission. Therefore pt discharged to NH. Follow up PCP next week for repeat blood work

## 2022-04-21 NOTE — ED NOTES
Pt resting at this time, eyes closed, visible rise and fall of chest, pt in no apparent distress. Pt bed in lowest position and locked, side rails up x2.

## 2022-04-21 NOTE — DISCHARGE INSTRUCTIONS
Follow up with your doctor  Make sure to drink plenty of fluids to stay hydrated  Return to ED for fevers, abdominal pain, vomiting, chest pain or any other concerns

## 2022-04-21 NOTE — ED NOTES
This RN called [facility] and gave report to GIFTY Keane. Pt waiting for EMS transport back to facility.

## 2022-04-21 NOTE — ED PROVIDER NOTES
Encounter Date: 4/20/2022       History     Chief Complaint   Patient presents with    Abdominal Pain     [facility] living in [place]. C/O abd pain x 2 days, generalized weakness and loss of appetite, more lethargic than baseline. -N/V     Mr. Medina is  An 83 yo M PMH HTN, DM, glaucoma who presents from nursing home with lethargy. Patient denies fevers, chills, abdominal pain, fatigue, changes in bowel movements, pain with urination or frequency. Patient is alert and able to answer most of my questions. I also called his nursing home [facility] where the night staff was able to minimally answer my questions about the events that brought him to the emergency department. According to nursing home patient was providing little response and was not moving his face earlier today. Nursing home reports the change in status was today only.         Review of patient's allergies indicates:  No Known Allergies  Past Medical History:   Diagnosis Date    Essential hypertension     Legal blindness     Preglaucoma     Type 2 diabetes mellitus      No past surgical history on file.  No family history on file.  Social History     Tobacco Use    Smoking status: Former Smoker    Smokeless tobacco: Never Used   Substance Use Topics    Alcohol use: Not Currently    Drug use: Not Currently     Review of Systems   Constitutional: Negative for activity change, chills, fatigue and fever.   HENT: Negative for congestion and drooling.    Respiratory: Negative for apnea and choking.    Cardiovascular: Negative for chest pain.   Gastrointestinal: Negative for abdominal distention, abdominal pain and constipation.   Endocrine: Negative for cold intolerance and heat intolerance.   Genitourinary: Negative for dysuria and frequency.   Musculoskeletal: Negative for arthralgias and back pain.   Skin: Negative for rash and wound.   Neurological: Negative for dizziness and facial asymmetry.   Psychiatric/Behavioral: Negative for  agitation, behavioral problems and confusion.       Physical Exam     Initial Vitals [04/20/22 1954]   BP Pulse Resp Temp SpO2   (!) 103/56 (!) 113 16 97.6 °F (36.4 °C) 98 %      MAP       --         Physical Exam    HENT:   Head: Normocephalic and atraumatic.   Eyes: Right eye exhibits discharge. Left eye exhibits discharge. No scleral icterus.   Neck: No thyromegaly present. No JVD present.   Cardiovascular: Normal rate, regular rhythm and intact distal pulses.   No murmur heard.  Pulmonary/Chest: No stridor. No respiratory distress. He has no wheezes.   Abdominal: Abdomen is soft. He exhibits no distension. There is no abdominal tenderness. There is no rebound.   Musculoskeletal:         General: No edema.      Comments: Cold distal extremities x4     Neurological: He is alert.   Disoriented to place and year   Skin: Skin is dry. Capillary refill takes less than 2 seconds. No rash noted. No erythema. No pallor.   Psychiatric: He has a normal mood and affect.         ED Course   Procedures  Labs Reviewed   CBC W/ AUTO DIFFERENTIAL - Abnormal; Notable for the following components:       Result Value    RBC 4.41 (*)     Hemoglobin 11.7 (*)     Hematocrit 37.1 (*)     MCH 26.5 (*)     MCHC 31.5 (*)     MPV 9.1 (*)     Lymph # 0.6 (*)     Gran % 78.6 (*)     Lymph % 9.7 (*)     All other components within normal limits   COMPREHENSIVE METABOLIC PANEL - Abnormal; Notable for the following components:    Glucose 123 (*)     BUN 35 (*)     Total Bilirubin 1.2 (*)     AST 50 (*)     eGFR if  58.7 (*)     eGFR if non  50.8 (*)     All other components within normal limits   URINALYSIS, REFLEX TO URINE CULTURE - Abnormal; Notable for the following components:    Occult Blood UA 2+ (*)     All other components within normal limits    Narrative:     Specimen Source->Urine   LACTIC ACID, PLASMA   TROPONIN I   LIPASE   TSH   MAGNESIUM   PHOSPHORUS   URINALYSIS MICROSCOPIC    Narrative:      Specimen Source->Urine        ECG Results          EKG 12-lead (Final result)  Result time 04/21/22 11:01:02    Final result by Interface, Lab In [facility] (04/21/22 11:01:02)                 Narrative:    Test Reason : R53.1,    Vent. Rate : 094 BPM     Atrial Rate : 093 BPM     P-R Int : 000 ms          QRS Dur : 138 ms      QT Int : 348 ms       P-R-T Axes : 000 051 231 degrees     QTc Int : 435 ms    Age and gender specific analysis  Probably NSR with marked artifact  Left bundle branch block  Abnormal ECG  When compared with ECG of 21-AUG-2021 14:24,  No major change  Confirmed by Juan David DOE MD ([id number]) on 4/21/2022 11:00:54 AM    Referred By: [email or web address]   SELF           Confirmed By:Juan David DOE MD                            Imaging Results          CT Head Without Contrast (Final result)  Result time 04/21/22 01:44:46    Final result by Phillip Musa MD (04/21/22 01:44:46)                 Impression:      No CT evidence of acute intracranial abnormality. Clinical correlation and further evaluation as warranted.    Chronic senescent and microvascular ischemic changes.      Electronically signed by: Phillip Musa MD  Date:    04/21/2022  Time:    01:44             Narrative:    EXAMINATION:  CT HEAD WITHOUT CONTRAST    CLINICAL HISTORY:  Mental status change, unknown cause;    TECHNIQUE:  Low dose axial images were obtained through the head.  Coronal and sagittal reformations were also performed. Contrast was not administered.    COMPARISON:  CT head 08/21/2021    FINDINGS:  There is generalized cerebral volume loss with compensatory sulcal widening and ventricular enlargement.  There is periventricular and supratentorial white matter hypoattenuation suggesting sequelae of chronic microvascular ischemic change.  There are small focal hypodensities within the left basal ganglia and left thalamus which may represent remote lacunar type infarcts.  There is no evidence of acute intracranial hemorrhage or midline  shift.  No extra-axial collections identified.  There are layering aerated secretions within the left sphenoid sinus.  The remaining paranasal sinuses and mastoid air cells are relatively well aerated.  The visualized bones of the calvarium demonstrate no acute osseous abnormality.                               X-Ray Chest AP Portable (Final result)  Result time 04/21/22 00:47:54    Final result by Isra Hoffman MD (04/21/22 00:47:54)                 Impression:      Chronic changes in the lungs, similar to prior.  No acute findings identified.    No significant change from prior study.      Electronically signed by: Isra Hoffman MD  Date:    04/21/2022  Time:    00:47             Narrative:    EXAMINATION:  XR CHEST AP PORTABLE    CLINICAL HISTORY:  Weakness    TECHNIQUE:  Single frontal view of the chest was performed.    COMPARISON:  08/21/2021.    FINDINGS:  Chronic changes in the lungs, similar to prior.    Heart and lungs  appear unchanged when allowing for differences in technique and positioning.                                 Medications   lactated ringers bolus 1,000 mL (0 mLs Intravenous Stopped 4/21/22 0024)     Medical Decision Making:   Initial Assessment:   On my initial assessment I am concerned patient is volume down given his dry mucosal membranes and tachycardia. Will plan to give fluid bolus. I am also concerned for uti given the nursing home reporting fatigue. On my exam I do not find abdominal tenderness to palpation, rebound or guarding. I would like to rule out stroke because of change in mental status reported by nursing home although he has no neurological deficeits on my initial exam. Patient will likely need admission   Differential Diagnosis:   UTI  Dehydration  URI  ED Management:  1L LR  CBC unremarkable  CMP unremarkable  Trop 0.017  Lactate 1.8  UA pending  CT head non con pending  Bladder scan >300, nursing straight cath and obtaining urine sample                Attending  Attestation:   Physician Attestation Statement for Resident:  As the supervising MD   Physician Attestation Statement: I have personally seen and examined this patient.   I agree with the above history. -:   As the supervising MD I agree with the above PE.    As the supervising MD I agree with the above treatment, course, plan, and disposition.   -: Weakness, reported lethargy.  Looks somewhat dehydrated.  Also with psych history, similar lethargy resulting in psych admissions.  Will give fluids, check labs, reassess.  Turned over to oncoming team.                           Clinical Impression:   Final diagnoses:  [R53.1] Weakness  [E86.0] Dehydration (Primary)  [R10.9] Abdominal pain, unspecified abdominal location          ED Disposition Condition    Discharge Stable        ED Prescriptions     None        Follow-up Information     Follow up With Specialties Details Why Contact [facility] Family Medicine Family Medicine Schedule an appointment as soon as possible for a visit   [address] [address]  [phone]             Idalia Arnold MD  Resident  04/21/22 0100       Idalia Arnold MD  Resident  04/21/22 0105       Ulises Hernandez MD  04/21/22 3657

## 2022-05-09 ENCOUNTER — LAB VISIT (OUTPATIENT)
Dept: LAB | Facility: OTHER | Age: 82
End: 2022-05-09
Payer: MEDICARE

## 2022-05-09 DIAGNOSIS — Z20.822 ENCOUNTER FOR LABORATORY TESTING FOR COVID-19 VIRUS: ICD-10-CM

## 2022-05-09 PROCEDURE — U0003 INFECTIOUS AGENT DETECTION BY NUCLEIC ACID (DNA OR RNA); SEVERE ACUTE RESPIRATORY SYNDROME CORONAVIRUS 2 (SARS-COV-2) (CORONAVIRUS DISEASE [COVID-19]), AMPLIFIED PROBE TECHNIQUE, MAKING USE OF HIGH THROUGHPUT TECHNOLOGIES AS DESCRIBED BY CMS-2020-01-R: HCPCS | Performed by: EMERGENCY MEDICINE

## 2022-05-10 LAB
SARS-COV-2 RNA RESP QL NAA+PROBE: NOT DETECTED
SARS-COV-2- CYCLE NUMBER: NORMAL

## 2022-08-22 ENCOUNTER — HOSPITAL ENCOUNTER (EMERGENCY)
Facility: HOSPITAL | Age: 82
Discharge: HOME | End: 2022-08-22
Attending: EMERGENCY MEDICINE
Payer: MEDICARE

## 2022-08-22 VITALS
HEIGHT: 71 IN | WEIGHT: 165 LBS | BODY MASS INDEX: 23.1 KG/M2 | SYSTOLIC BLOOD PRESSURE: 131 MMHG | OXYGEN SATURATION: 100 % | DIASTOLIC BLOOD PRESSURE: 69 MMHG | HEART RATE: 94 BPM | RESPIRATION RATE: 17 BRPM | TEMPERATURE: 99 F

## 2022-08-22 DIAGNOSIS — R41.82 ALTERED MENTAL STATUS: ICD-10-CM

## 2022-08-22 DIAGNOSIS — N39.0 URINARY TRACT INFECTION WITHOUT HEMATURIA, SITE UNSPECIFIED: Primary | ICD-10-CM

## 2022-08-22 LAB
ALBUMIN SERPL BCP-MCNC: 3.1 G/DL (ref 3.5–5.2)
ALP SERPL-CCNC: 92 U/L (ref 55–135)
ALT SERPL W/O P-5'-P-CCNC: 14 U/L (ref 10–44)
ANION GAP SERPL CALC-SCNC: 8 MMOL/L (ref 8–16)
AST SERPL-CCNC: 20 U/L (ref 10–40)
BACTERIA #/AREA URNS HPF: ABNORMAL /HPF
BASOPHILS # BLD AUTO: 0.01 K/UL (ref 0–0.2)
BASOPHILS NFR BLD: 0.1 % (ref 0–1.9)
BILIRUB SERPL-MCNC: 0.9 MG/DL (ref 0.1–1)
BILIRUB UR QL STRIP: NEGATIVE
BUN SERPL-MCNC: 16 MG/DL (ref 8–23)
CALCIUM SERPL-MCNC: 9.5 MG/DL (ref 8.7–10.5)
CHLORIDE SERPL-SCNC: 105 MMOL/L (ref 95–110)
CK SERPL-CCNC: 85 U/L (ref 20–200)
CLARITY UR: ABNORMAL
CO2 SERPL-SCNC: 25 MMOL/L (ref 23–29)
COLOR UR: YELLOW
CREAT SERPL-MCNC: 0.7 MG/DL (ref 0.5–1.4)
DIFFERENTIAL METHOD: ABNORMAL
EOSINOPHIL # BLD AUTO: 0 K/UL (ref 0–0.5)
EOSINOPHIL NFR BLD: 0.6 % (ref 0–8)
ERYTHROCYTE [DISTWIDTH] IN BLOOD BY AUTOMATED COUNT: 13.3 % (ref 11.5–14.5)
EST. GFR  (NO RACE VARIABLE): >60 ML/MIN/1.73 M^2
GLUCOSE SERPL-MCNC: 97 MG/DL (ref 70–110)
GLUCOSE UR QL STRIP: NEGATIVE
HCT VFR BLD AUTO: 36.1 % (ref 40–54)
HGB BLD-MCNC: 11.6 G/DL (ref 14–18)
HGB UR QL STRIP: ABNORMAL
HYALINE CASTS #/AREA URNS LPF: 0 /LPF
IMM GRANULOCYTES # BLD AUTO: 0.02 K/UL (ref 0–0.04)
IMM GRANULOCYTES NFR BLD AUTO: 0.3 % (ref 0–0.5)
KETONES UR QL STRIP: NEGATIVE
LEUKOCYTE ESTERASE UR QL STRIP: NEGATIVE
LYMPHOCYTES # BLD AUTO: 1.6 K/UL (ref 1–4.8)
LYMPHOCYTES NFR BLD: 23.3 % (ref 18–48)
MCH RBC QN AUTO: 27.2 PG (ref 27–31)
MCHC RBC AUTO-ENTMCNC: 32.1 G/DL (ref 32–36)
MCV RBC AUTO: 85 FL (ref 82–98)
MICROSCOPIC COMMENT: ABNORMAL
MONOCYTES # BLD AUTO: 0.5 K/UL (ref 0.3–1)
MONOCYTES NFR BLD: 7.5 % (ref 4–15)
NEUTROPHILS # BLD AUTO: 4.6 K/UL (ref 1.8–7.7)
NEUTROPHILS NFR BLD: 68.2 % (ref 38–73)
NITRITE UR QL STRIP: NEGATIVE
NRBC BLD-RTO: 0 /100 WBC
PH UR STRIP: 6 [PH] (ref 5–8)
PLATELET # BLD AUTO: 179 K/UL (ref 150–450)
PMV BLD AUTO: 8.9 FL (ref 9.2–12.9)
POTASSIUM SERPL-SCNC: 4 MMOL/L (ref 3.5–5.1)
PROT SERPL-MCNC: 6.9 G/DL (ref 6–8.4)
PROT UR QL STRIP: ABNORMAL
RBC # BLD AUTO: 4.26 M/UL (ref 4.6–6.2)
RBC #/AREA URNS HPF: 21 /HPF (ref 0–4)
SODIUM SERPL-SCNC: 138 MMOL/L (ref 136–145)
SP GR UR STRIP: 1.03 (ref 1–1.03)
SQUAMOUS #/AREA URNS HPF: 0 /HPF
TROPONIN I SERPL DL<=0.01 NG/ML-MCNC: 0.01 NG/ML (ref 0–0.03)
UNIDENT CRYS URNS QL MICRO: 16
URN SPEC COLLECT METH UR: ABNORMAL
UROBILINOGEN UR STRIP-ACNC: NEGATIVE EU/DL
WBC # BLD AUTO: 6.79 K/UL (ref 3.9–12.7)
WBC #/AREA URNS HPF: 7 /HPF (ref 0–5)
YEAST URNS QL MICRO: ABNORMAL

## 2022-08-22 PROCEDURE — 82550 ASSAY OF CK (CPK): CPT | Performed by: EMERGENCY MEDICINE

## 2022-08-22 PROCEDURE — 85025 COMPLETE CBC W/AUTO DIFF WBC: CPT | Performed by: EMERGENCY MEDICINE

## 2022-08-22 PROCEDURE — 81000 URINALYSIS NONAUTO W/SCOPE: CPT | Performed by: EMERGENCY MEDICINE

## 2022-08-22 PROCEDURE — 80053 COMPREHEN METABOLIC PANEL: CPT | Performed by: EMERGENCY MEDICINE

## 2022-08-22 PROCEDURE — 93010 EKG 12-LEAD: ICD-10-PCS | Mod: ,,, | Performed by: INTERNAL MEDICINE

## 2022-08-22 PROCEDURE — 93005 ELECTROCARDIOGRAM TRACING: CPT

## 2022-08-22 PROCEDURE — 84484 ASSAY OF TROPONIN QUANT: CPT | Performed by: EMERGENCY MEDICINE

## 2022-08-22 PROCEDURE — 93010 ELECTROCARDIOGRAM REPORT: CPT | Mod: ,,, | Performed by: INTERNAL MEDICINE

## 2022-08-22 PROCEDURE — 99285 EMERGENCY DEPT VISIT HI MDM: CPT | Mod: 25

## 2022-08-22 RX ORDER — CEFTRIAXONE 500 MG/1
INJECTION, POWDER, FOR SOLUTION INTRAMUSCULAR; INTRAVENOUS
COMMUNITY
Start: 2022-08-12 | End: 2022-08-22

## 2022-08-22 RX ORDER — LIDOCAINE HYDROCHLORIDE 10 MG/ML
INJECTION INFILTRATION; PERINEURAL
COMMUNITY
Start: 2022-08-12 | End: 2022-08-22

## 2022-08-22 RX ORDER — DORZOLAMIDE HCL 20 MG/ML
1 SOLUTION/ DROPS OPHTHALMIC 2 TIMES DAILY
COMMUNITY
Start: 2022-05-29

## 2022-08-22 RX ORDER — MEGESTROL ACETATE 40 MG/ML
800 SUSPENSION ORAL DAILY
Status: ON HOLD | COMMUNITY
Start: 2022-08-12 | End: 2022-09-29

## 2022-08-22 RX ORDER — ACETAMINOPHEN 500 MG
500 TABLET ORAL 2 TIMES DAILY
COMMUNITY

## 2022-08-22 RX ORDER — RISPERIDONE 2 MG/1
2 TABLET ORAL NIGHTLY
COMMUNITY
Start: 2022-08-05

## 2022-08-22 RX ORDER — ERGOCALCIFEROL 1.25 MG/1
50000 CAPSULE ORAL
COMMUNITY
End: 2022-08-22

## 2022-08-22 RX ORDER — NITROFURANTOIN 25; 75 MG/1; MG/1
100 CAPSULE ORAL 2 TIMES DAILY
COMMUNITY
Start: 2022-08-12 | End: 2022-08-22

## 2022-08-22 RX ORDER — ZINC SULFATE 50(220)MG
220 CAPSULE ORAL DAILY
COMMUNITY

## 2022-08-22 RX ORDER — CEFTRIAXONE 1 G/1
INJECTION, POWDER, FOR SOLUTION INTRAMUSCULAR; INTRAVENOUS
COMMUNITY
Start: 2022-08-12 | End: 2022-08-22

## 2022-08-22 RX ORDER — TIMOLOL MALEATE 5 MG/ML
1 SOLUTION/ DROPS OPHTHALMIC 2 TIMES DAILY
COMMUNITY
Start: 2022-05-29

## 2022-08-22 RX ORDER — CEPHALEXIN 500 MG/1
500 CAPSULE ORAL EVERY 12 HOURS
Qty: 10 CAPSULE | Refills: 0 | Status: SHIPPED | OUTPATIENT
Start: 2022-08-22 | End: 2022-08-27

## 2022-08-22 RX ORDER — RISPERIDONE 1 MG/1
1 TABLET ORAL DAILY
COMMUNITY
Start: 2022-08-05

## 2022-08-22 RX ORDER — BRIMONIDINE TARTRATE 1 MG/ML
1 SOLUTION/ DROPS OPHTHALMIC 2 TIMES DAILY
COMMUNITY
Start: 2022-05-29

## 2022-08-22 RX ORDER — MULTIVITAMIN
1 TABLET ORAL DAILY
Status: ON HOLD | COMMUNITY
End: 2022-10-03 | Stop reason: HOSPADM

## 2022-08-22 NOTE — PHARMACY MED REC
"Admission Medication History     The home medication history was taken by Lisa Marte CPhT.    Medication history obtained from, [facility] List Verified    You may go to "Admission" then "Reconcile Home Medications" tabs to review and/or act upon these items.      The home medication list has been updated by the Pharmacy department.    Please read ALL comments highlighted in yellow.    Please address this information as you see fit.     Feel free to contact us if you have any questions or require assistance.      The medications listed below were removed from the home medication list.  Please reorder if appropriate:  Patient reports no longer taking the following medication(s):   Abilify 5 mg   XYZAL 5 mg   Metformin 500 mg   Senna 8.6 mg   Ceftriaxone 500 mg and 1 gram IV   Vitamin D 50,000 units   Lidocaine 1% injection   Macrobid 100 mg        Lisa Marte CPhT.  Ext [phone]                .          "

## 2022-08-22 NOTE — ED PROVIDER NOTES
Encounter Date: 8/22/2022       History     Chief Complaint   Patient presents with    Altered Mental Status     Pt not talking or eating over last 2 weeks. Pt resident  at [facility].  Oriented to self and time.     Patient is an 82-year-old male with a past medical history of legal blindness, type 2 diabetes, essential hypertension, sacral wounds who presents to the ED from [facility] with complaint of altered mental status.  Per [facility] home, patient has not been talking or eating over the past 2 weeks.  On my arrival to evaluate the patient, the patient is very juch talking to me; he is alert and oriented to person and place.  He is answering my questions appropriately and does not appear to be altered whatsoever.  He denies any discrete complaints at this time.  He states that he sometimes has difficulty swallowing his pills but he denies any stroke-like symptoms, fevers, chills, nausea, vomiting chest pain or shortness of breath.        Review of patient's allergies indicates:  No Known Allergies  Past Medical History:   Diagnosis Date    Essential hypertension     Legal blindness     Preglaucoma     Type 2 diabetes mellitus      History reviewed. No pertinent surgical history.  History reviewed. No pertinent family history.  Social History     Tobacco Use    Smoking status: Former Smoker    Smokeless tobacco: Never Used   Substance Use Topics    Alcohol use: Not Currently    Drug use: Not Currently     Review of Systems   Constitutional: Negative for chills and fever.   Cardiovascular: Negative for chest pain, palpitations and leg swelling.   Gastrointestinal: Negative for abdominal pain, nausea and vomiting.   Musculoskeletal: Negative for back pain and joint swelling.   Skin: Negative for pallor and rash.   Psychiatric/Behavioral: Negative for agitation and confusion.       Physical Exam     Initial Vitals [08/22/22 1301]   BP Pulse Resp Temp SpO2   (!) 113/59 81 18 98.6 °F (37 °C) 99 %       MAP       --         Physical Exam    Nursing note and vitals reviewed.  Constitutional: He appears well-developed. He is not diaphoretic. No distress.   No distress noted    HENT:   Head: Normocephalic and atraumatic.   Right Ear: External ear normal.   Left Ear: External ear normal.   No signs of head trauma   Eyes: Conjunctivae are normal.   Neck: Neck supple.   Normal ROM    Normal range of motion.  Cardiovascular: Normal rate, regular rhythm, normal heart sounds and intact distal pulses.   Pulmonary/Chest: Breath sounds normal.   Lungs clear to auscultation bilaterally   Abdominal: Abdomen is soft. Bowel sounds are normal.   Abdomen is soft, nontender nondistended   Musculoskeletal:      Cervical back: Normal range of motion and neck supple.     Neurological: He is alert.   Patient is alert and oriented to person and place; he is lucid and coherent; he does not demonstrate any focal neurological deficit on my examination   Skin: Skin is warm. Capillary refill takes less than 2 seconds.   Quarter-sized skin breakdown noted to the sacral region; no sign of infection noted         ED Course   Procedures  Labs Reviewed   CBC W/ AUTO DIFFERENTIAL - Abnormal; Notable for the following components:       Result Value    RBC 4.26 (*)     Hemoglobin 11.6 (*)     Hematocrit 36.1 (*)     MPV 8.9 (*)     All other components within normal limits   COMPREHENSIVE METABOLIC PANEL - Abnormal; Notable for the following components:    Albumin 3.1 (*)     All other components within normal limits   URINALYSIS, REFLEX TO URINE CULTURE - Abnormal; Notable for the following components:    Appearance, UA Hazy (*)     Protein, UA 1+ (*)     Occult Blood UA Trace (*)     All other components within normal limits    Narrative:     Specimen Source->Urine   URINALYSIS MICROSCOPIC - Abnormal; Notable for the following components:    RBC, UA 21 (*)     WBC, UA 7 (*)     Yeast, UA Rare (*)     All other components within normal limits     Narrative:     Specimen Source->Urine   CK   TROPONIN I     EKG Readings: (Independently Interpreted)   Initial Reading: No STEMI. Rhythm: Sinus Arrhythmia. Heart Rate: 78. Conduction: LBBB.   Sinus rhythm with arrhythmia; no STEMI; LBBB (which is not new)      ECG Results          EKG 12-lead (Final result)  Result time 08/22/22 20:02:45    Final result by Interface, Lab In [facility] (08/22/22 20:02:45)                 Narrative:    Test Reason : R41.82,    Vent. Rate : 078 BPM     Atrial Rate : 078 BPM     P-R Int : 134 ms          QRS Dur : 136 ms      QT Int : 394 ms       P-R-T Axes : 060 072 260 degrees     QTc Int : 449 ms    Sinus rhythm with marked sinus arrythmia  Left bundle branch block  Abnormal ECG  When compared with ECG of 20-APR-2022 22:43,  Previous ECG has undetermined rhythm, needs review    Confirmed by Tevin Navarrete MD ([id number]) on 8/22/2022 8:02:40 PM    Referred By: System System           Confirmed By:Tevin Navarrete MD                            Imaging Results          X-Ray Chest AP Portable (Final result)  Result time 08/22/22 17:16:02    Final result by Atul Box MD (08/22/22 17:16:02)                 Impression:      Stable examination.  No acute process.      Electronically signed by: Atul Box MD  Date:    08/22/2022  Time:    17:16             Narrative:    EXAMINATION:  XR CHEST AP PORTABLE    CLINICAL HISTORY:  Altered mental status, unspecified    TECHNIQUE:  Single frontal view of the chest was performed.    COMPARISON:  04/20/2022.    FINDINGS:  Monitoring EKG leads are present.  The trachea is unremarkable.  The cardiomediastinal silhouette is unchanged.  There is no evidence of free air beneath the hemidiaphragms.  There are no pleural effusions.  There is no evidence of a pneumothorax.  There is no evidence of pneumomediastinum.  There are chronic interstitial opacities.  There is also changes of bilateral pulmonary scarring.  No focal consolidation is present.  There  are degenerative changes in the osseous structures.                               CT Head Without Contrast (Final result)  Result time 08/22/22 16:14:38    Final result by Atul Box MD (08/22/22 16:14:38)                 Impression:      No acute intracranial process.  Additional evaluation, as clinically warranted.    Changes of chronic small vessel ischemic disease and cerebral volume loss.      Electronically signed by: Atul Box MD  Date:    08/22/2022  Time:    16:14             Narrative:    EXAMINATION:  CT HEAD WITHOUT CONTRAST    CLINICAL HISTORY:  Mental status change, unknown cause;    TECHNIQUE:  Low dose axial images were obtained through the head.  Coronal and sagittal reformations were also performed. Contrast was not administered.    COMPARISON:  CT dated 04/21/2022.    FINDINGS:  The subcutaneous tissues are unremarkable.  The bony calvarium is intact.  There is mucosal thickening within the sphenoid sinuses.  The mastoid air cells are clear.  There are postoperative changes in the globes.    The craniocervical junction is intact.  The midline structures are unremarkable.  There are no extra-axial fluid collections.  There is no evidence of intracranial hemorrhage.  The ventricles and sulci are prominent, consistent cerebral volume loss.  There are hypodensities within the periventricular and subcortical white matter.  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  There is no dense vessel sign.  There is no evidence of mass effect.                                 Medications - No data to display  Medical Decision Making:   Clinical Tests:   Lab Tests: Reviewed and Ordered  Radiological Study: Ordered and Reviewed  ED Management:  - CT head WO negative for acute intracranial abnormality  - laboratory workup unremarkable  - CXR without acute cardiopulmonary process per my interpretation; this was confirmed by the final radiology read    -  UA with yeast, 21 RBC, 7 WBC and rare bacteria; will not treat for asymptomatic candiduria as it is not occurring in the setting of neutropenia; pt not scheduled for any urinary tract manipulation thereby requiring antifungal coverage; will discharge with rx for cephalexin  - contrary to nursing home reports of patient not speaking and not eating, the patient was very conversational with me and he did consume a food tray without difficulty while in the ED; his workup is essentially unremarkable other than questionable infection of his urine; I do not see a need for admission or further evaluation at this time; the patient is tolerating p.o., he is oriented to person place and he is conversing with me and the nursing staff without difficulty or with any indication that he is experiencing an alteration in his mental status; I feel comfortable at this time discharge the patient back home to the nursing home                      Clinical Impression:   Final diagnoses:  [R41.82] Altered mental status  [N39.0] Urinary tract infection without hematuria, site unspecified (Primary)          ED Disposition Condition    Discharge Stable        ED Prescriptions     Medication Sig Dispense Start Date End Date Auth. Provider    cephALEXin (KEFLEX) 500 MG capsule Take 1 capsule (500 mg total) by mouth every 12 (twelve) hours. for 5 days 10 capsule 8/22/2022 8/27/2022 Lambert Rodgers MD        Follow-up Information    None          Lambert Rodgers MD  08/23/22 1669

## 2022-08-22 NOTE — ED NOTES
Report called to GAVIN Lewis Discharge instructions and prescriptions reviewed with nurse; verbalizes understanding. Patient lying in bed, ESTELA TOLLIVER.

## 2022-08-22 NOTE — DISCHARGE INSTRUCTIONS
Workup unremarkable.   Pt with scant WBCs in urine. Will presumptively treat with OP abx.   Pt able to hold conversation with me and he ate without any difficulty.  No indication for further testing. Pt stable for discharge back to nursing home.

## 2022-08-31 ENCOUNTER — HOSPITAL ENCOUNTER (INPATIENT)
Facility: HOSPITAL | Age: 82
LOS: 2 days | Discharge: HOME | DRG: 175 | End: 2022-09-04
Attending: EMERGENCY MEDICINE | Admitting: FAMILY MEDICINE
Payer: MEDICARE

## 2022-08-31 DIAGNOSIS — I26.99 OTHER ACUTE PULMONARY EMBOLISM WITHOUT ACUTE COR PULMONALE: ICD-10-CM

## 2022-08-31 DIAGNOSIS — R41.0 DISORIENTATED: ICD-10-CM

## 2022-08-31 DIAGNOSIS — I95.9 HYPOTENSION, UNSPECIFIED HYPOTENSION TYPE: Primary | ICD-10-CM

## 2022-08-31 DIAGNOSIS — I10 ESSENTIAL HYPERTENSION: ICD-10-CM

## 2022-08-31 DIAGNOSIS — R41.82 ALTERED MENTAL STATUS: ICD-10-CM

## 2022-08-31 DIAGNOSIS — E11.9 TYPE 2 DIABETES MELLITUS WITHOUT COMPLICATION, WITHOUT LONG-TERM CURRENT USE OF INSULIN: ICD-10-CM

## 2022-08-31 DIAGNOSIS — I26.94 MULTIPLE SUBSEGMENTAL PULMONARY EMBOLI WITHOUT ACUTE COR PULMONALE: ICD-10-CM

## 2022-08-31 DIAGNOSIS — I26.99 PULMONARY EMBOLUS: ICD-10-CM

## 2022-08-31 LAB
ALBUMIN SERPL BCP-MCNC: 2.9 G/DL (ref 3.5–5.2)
ALLENS TEST: ABNORMAL
ALP SERPL-CCNC: 101 U/L (ref 55–135)
ALT SERPL W/O P-5'-P-CCNC: 28 U/L (ref 10–44)
AMMONIA PLAS-SCNC: 21 UMOL/L (ref 10–50)
ANION GAP SERPL CALC-SCNC: 14 MMOL/L (ref 8–16)
APTT BLDCRRT: <21 SEC (ref 21–32)
AST SERPL-CCNC: 28 U/L (ref 10–40)
BASOPHILS # BLD AUTO: 0.01 K/UL (ref 0–0.2)
BASOPHILS NFR BLD: 0.2 % (ref 0–1.9)
BILIRUB SERPL-MCNC: 0.9 MG/DL (ref 0.1–1)
BNP SERPL-MCNC: 10 PG/ML (ref 0–99)
BUN SERPL-MCNC: 30 MG/DL (ref 8–23)
CALCIUM SERPL-MCNC: 8.9 MG/DL (ref 8.7–10.5)
CHLORIDE SERPL-SCNC: 107 MMOL/L (ref 95–110)
CO2 SERPL-SCNC: 19 MMOL/L (ref 23–29)
CREAT SERPL-MCNC: 0.8 MG/DL (ref 0.5–1.4)
DELSYS: ABNORMAL
DIFFERENTIAL METHOD: ABNORMAL
EOSINOPHIL # BLD AUTO: 0 K/UL (ref 0–0.5)
EOSINOPHIL NFR BLD: 0.5 % (ref 0–8)
ERYTHROCYTE [DISTWIDTH] IN BLOOD BY AUTOMATED COUNT: 14 % (ref 11.5–14.5)
EST. GFR  (NO RACE VARIABLE): >60 ML/MIN/1.73 M^2
GLUCOSE SERPL-MCNC: 152 MG/DL (ref 70–110)
HCO3 UR-SCNC: 22.9 MMOL/L (ref 24–28)
HCT VFR BLD AUTO: 35.2 % (ref 40–54)
HGB BLD-MCNC: 11.4 G/DL (ref 14–18)
IMM GRANULOCYTES # BLD AUTO: 0.02 K/UL (ref 0–0.04)
IMM GRANULOCYTES NFR BLD AUTO: 0.4 % (ref 0–0.5)
INR PPP: 1.1 (ref 0.8–1.2)
LACTATE SERPL-SCNC: 1.8 MMOL/L (ref 0.5–2.2)
LIPASE SERPL-CCNC: 57 U/L (ref 4–60)
LYMPHOCYTES # BLD AUTO: 1 K/UL (ref 1–4.8)
LYMPHOCYTES NFR BLD: 18.5 % (ref 18–48)
MAGNESIUM SERPL-MCNC: 1.7 MG/DL (ref 1.6–2.6)
MCH RBC QN AUTO: 27.4 PG (ref 27–31)
MCHC RBC AUTO-ENTMCNC: 32.4 G/DL (ref 32–36)
MCV RBC AUTO: 85 FL (ref 82–98)
MONOCYTES # BLD AUTO: 0.3 K/UL (ref 0.3–1)
MONOCYTES NFR BLD: 5.8 % (ref 4–15)
NEUTROPHILS # BLD AUTO: 4.1 K/UL (ref 1.8–7.7)
NEUTROPHILS NFR BLD: 74.6 % (ref 38–73)
NRBC BLD-RTO: 0 /100 WBC
PCO2 BLDA: 36.1 MMHG (ref 35–45)
PH SMN: 7.41 [PH] (ref 7.35–7.45)
PLATELET # BLD AUTO: 234 K/UL (ref 150–450)
PMV BLD AUTO: 9 FL (ref 9.2–12.9)
PO2 BLDA: 387 MMHG (ref 80–100)
POC BE: -2 MMOL/L
POC SATURATED O2: 100 % (ref 95–100)
POC TCO2: 24 MMOL/L (ref 23–27)
POTASSIUM SERPL-SCNC: 4.1 MMOL/L (ref 3.5–5.1)
PROCALCITONIN SERPL IA-MCNC: 0.06 NG/ML
PROT SERPL-MCNC: 6.6 G/DL (ref 6–8.4)
PROTHROMBIN TIME: 11.1 SEC (ref 9–12.5)
RBC # BLD AUTO: 4.16 M/UL (ref 4.6–6.2)
SAMPLE: ABNORMAL
SITE: ABNORMAL
SODIUM SERPL-SCNC: 140 MMOL/L (ref 136–145)
TROPONIN I SERPL DL<=0.01 NG/ML-MCNC: 0.01 NG/ML (ref 0–0.03)
WBC # BLD AUTO: 5.51 K/UL (ref 3.9–12.7)

## 2022-08-31 PROCEDURE — 93010 ELECTROCARDIOGRAM REPORT: CPT | Mod: ,,, | Performed by: INTERNAL MEDICINE

## 2022-08-31 PROCEDURE — 82962 GLUCOSE BLOOD TEST: CPT

## 2022-08-31 PROCEDURE — 82140 ASSAY OF AMMONIA: CPT | Performed by: EMERGENCY MEDICINE

## 2022-08-31 PROCEDURE — 99285 EMERGENCY DEPT VISIT HI MDM: CPT | Mod: 25

## 2022-08-31 PROCEDURE — 84484 ASSAY OF TROPONIN QUANT: CPT | Performed by: EMERGENCY MEDICINE

## 2022-08-31 PROCEDURE — 63600175 PHARM REV CODE 636 W HCPCS: Performed by: EMERGENCY MEDICINE

## 2022-08-31 PROCEDURE — 85025 COMPLETE CBC W/AUTO DIFF WBC: CPT | Performed by: EMERGENCY MEDICINE

## 2022-08-31 PROCEDURE — 83690 ASSAY OF LIPASE: CPT | Performed by: EMERGENCY MEDICINE

## 2022-08-31 PROCEDURE — 96365 THER/PROPH/DIAG IV INF INIT: CPT

## 2022-08-31 PROCEDURE — 84145 PROCALCITONIN (PCT): CPT | Performed by: EMERGENCY MEDICINE

## 2022-08-31 PROCEDURE — 83735 ASSAY OF MAGNESIUM: CPT | Performed by: EMERGENCY MEDICINE

## 2022-08-31 PROCEDURE — 96361 HYDRATE IV INFUSION ADD-ON: CPT

## 2022-08-31 PROCEDURE — U0002 COVID-19 LAB TEST NON-CDC: HCPCS | Performed by: EMERGENCY MEDICINE

## 2022-08-31 PROCEDURE — 93005 ELECTROCARDIOGRAM TRACING: CPT

## 2022-08-31 PROCEDURE — 85730 THROMBOPLASTIN TIME PARTIAL: CPT | Performed by: EMERGENCY MEDICINE

## 2022-08-31 PROCEDURE — 85610 PROTHROMBIN TIME: CPT | Performed by: EMERGENCY MEDICINE

## 2022-08-31 PROCEDURE — 81003 URINALYSIS AUTO W/O SCOPE: CPT | Performed by: EMERGENCY MEDICINE

## 2022-08-31 PROCEDURE — 87040 BLOOD CULTURE FOR BACTERIA: CPT | Performed by: EMERGENCY MEDICINE

## 2022-08-31 PROCEDURE — 80053 COMPREHEN METABOLIC PANEL: CPT | Performed by: EMERGENCY MEDICINE

## 2022-08-31 PROCEDURE — 83605 ASSAY OF LACTIC ACID: CPT | Performed by: EMERGENCY MEDICINE

## 2022-08-31 PROCEDURE — 93010 EKG 12-LEAD: ICD-10-PCS | Mod: ,,, | Performed by: INTERNAL MEDICINE

## 2022-08-31 PROCEDURE — 83880 ASSAY OF NATRIURETIC PEPTIDE: CPT | Performed by: EMERGENCY MEDICINE

## 2022-08-31 RX ORDER — CEFEPIME HYDROCHLORIDE 1 G/50ML
2 INJECTION, SOLUTION INTRAVENOUS
Status: COMPLETED | OUTPATIENT
Start: 2022-08-31 | End: 2022-08-31

## 2022-08-31 RX ADMIN — SODIUM CHLORIDE, SODIUM LACTATE, POTASSIUM CHLORIDE, AND CALCIUM CHLORIDE 2259 ML: .6; .31; .03; .02 INJECTION, SOLUTION INTRAVENOUS at 09:08

## 2022-08-31 RX ADMIN — CEFEPIME HYDROCHLORIDE 2 G: 2 INJECTION, SOLUTION INTRAVENOUS at 10:08

## 2022-09-01 PROBLEM — E08.00 DIABETES MELLITUS DUE TO UNDERLYING CONDITION WITH HYPEROSMOLARITY WITHOUT COMA, WITHOUT LONG-TERM CURRENT USE OF INSULIN: Status: ACTIVE | Noted: 2021-08-26

## 2022-09-01 PROBLEM — H54.7 BLINDNESS: Status: ACTIVE | Noted: 2021-09-23

## 2022-09-01 PROBLEM — R41.0 DISORIENTATION: Status: ACTIVE | Noted: 2022-09-01

## 2022-09-01 PROBLEM — I26.94 MULTIPLE SUBSEGMENTAL PULMONARY EMBOLI WITHOUT ACUTE COR PULMONALE: Status: ACTIVE | Noted: 2022-09-01

## 2022-09-01 PROBLEM — I10 ESSENTIAL HYPERTENSION: Status: ACTIVE | Noted: 2021-08-26

## 2022-09-01 PROBLEM — E78.2 MIXED HYPERLIPIDEMIA: Status: ACTIVE | Noted: 2021-08-26

## 2022-09-01 LAB
ALBUMIN SERPL BCP-MCNC: 3.1 G/DL (ref 3.5–5.2)
ALP SERPL-CCNC: 112 U/L (ref 55–135)
ALT SERPL W/O P-5'-P-CCNC: 27 U/L (ref 10–44)
ANION GAP SERPL CALC-SCNC: 14 MMOL/L (ref 8–16)
AORTIC ROOT ANNULUS: 2.94 CM
APTT BLDCRRT: 116.2 SEC (ref 21–32)
APTT BLDCRRT: 148.6 SEC (ref 21–32)
APTT BLDCRRT: 25.4 SEC (ref 21–32)
AST SERPL-CCNC: 30 U/L (ref 10–40)
AV INDEX (PROSTH): 0.72
AV MEAN GRADIENT: 5 MMHG
AV PEAK GRADIENT: 9 MMHG
AV VALVE AREA: 2.25 CM2
AV VELOCITY RATIO: 0.73
BASOPHILS # BLD AUTO: 0.01 K/UL (ref 0–0.2)
BASOPHILS NFR BLD: 0.2 % (ref 0–1.9)
BILIRUB SERPL-MCNC: 0.8 MG/DL (ref 0.1–1)
BILIRUB UR QL STRIP: NEGATIVE
BSA FOR ECHO PROCEDURE: 1.73 M2
BUN SERPL-MCNC: 28 MG/DL (ref 8–23)
CALCIUM SERPL-MCNC: 9.1 MG/DL (ref 8.7–10.5)
CHLORIDE SERPL-SCNC: 106 MMOL/L (ref 95–110)
CLARITY UR: CLEAR
CO2 SERPL-SCNC: 21 MMOL/L (ref 23–29)
COLOR UR: YELLOW
CREAT SERPL-MCNC: 0.8 MG/DL (ref 0.5–1.4)
CTP QC/QA: YES
CV ECHO LV RWT: 0.42 CM
DIFFERENTIAL METHOD: ABNORMAL
DOP CALC AO PEAK VEL: 1.53 M/S
DOP CALC AO VTI: 26.16 CM
DOP CALC LVOT AREA: 3.1 CM2
DOP CALC LVOT DIAMETER: 2 CM
DOP CALC LVOT PEAK VEL: 1.11 M/S
DOP CALC LVOT STROKE VOLUME: 58.78 CM3
DOP CALC MV VTI: 16.19 CM
DOP CALCLVOT PEAK VEL VTI: 18.72 CM
ECHO LV POSTERIOR WALL: 0.9 CM (ref 0.6–1.1)
EJECTION FRACTION: 65 %
EOSINOPHIL # BLD AUTO: 0 K/UL (ref 0–0.5)
EOSINOPHIL NFR BLD: 0.6 % (ref 0–8)
ERYTHROCYTE [DISTWIDTH] IN BLOOD BY AUTOMATED COUNT: 14 % (ref 11.5–14.5)
EST. GFR  (NO RACE VARIABLE): >60 ML/MIN/1.73 M^2
FRACTIONAL SHORTENING: 58 % (ref 28–44)
GLUCOSE SERPL-MCNC: 129 MG/DL (ref 70–110)
GLUCOSE UR QL STRIP: NEGATIVE
HCT VFR BLD AUTO: 37.6 % (ref 40–54)
HGB BLD-MCNC: 12.3 G/DL (ref 14–18)
HGB UR QL STRIP: NEGATIVE
IMM GRANULOCYTES # BLD AUTO: 0.03 K/UL (ref 0–0.04)
IMM GRANULOCYTES NFR BLD AUTO: 0.6 % (ref 0–0.5)
INR PPP: 1.1 (ref 0.8–1.2)
INTERVENTRICULAR SEPTUM: 0.9 CM (ref 0.6–1.1)
KETONES UR QL STRIP: ABNORMAL
LA MAJOR: 2.26 CM
LA MINOR: 2.56 CM
LA WIDTH: 3.51 CM
LEFT ATRIUM SIZE: 2.43 CM
LEFT ATRIUM VOLUME INDEX MOD: 9.7 ML/M2
LEFT ATRIUM VOLUME INDEX: 9.8 ML/M2
LEFT ATRIUM VOLUME MOD: 17.09 CM3
LEFT ATRIUM VOLUME: 17.4 CM3
LEFT INTERNAL DIMENSION IN SYSTOLE: 1.8 CM (ref 2.1–4)
LEFT VENTRICLE DIASTOLIC VOLUME INDEX: 35.38 ML/M2
LEFT VENTRICLE DIASTOLIC VOLUME: 62.63 ML
LEFT VENTRICLE MASS INDEX: 70 G/M2
LEFT VENTRICLE SYSTOLIC VOLUME INDEX: 20.2 ML/M2
LEFT VENTRICLE SYSTOLIC VOLUME: 35.79 ML
LEFT VENTRICULAR INTERNAL DIMENSION IN DIASTOLE: 4.3 CM (ref 3.5–6)
LEFT VENTRICULAR MASS: 123.3 G
LEUKOCYTE ESTERASE UR QL STRIP: NEGATIVE
LYMPHOCYTES # BLD AUTO: 1 K/UL (ref 1–4.8)
LYMPHOCYTES NFR BLD: 18.6 % (ref 18–48)
MAGNESIUM SERPL-MCNC: 1.8 MG/DL (ref 1.6–2.6)
MCH RBC QN AUTO: 27.2 PG (ref 27–31)
MCHC RBC AUTO-ENTMCNC: 32.7 G/DL (ref 32–36)
MCV RBC AUTO: 83 FL (ref 82–98)
MONOCYTES # BLD AUTO: 0.2 K/UL (ref 0.3–1)
MONOCYTES NFR BLD: 4 % (ref 4–15)
MV MEAN GRADIENT: 1 MMHG
MV PEAK GRADIENT: 4 MMHG
MV VALVE AREA BY CONTINUITY EQUATION: 3.63 CM2
NEUTROPHILS # BLD AUTO: 4 K/UL (ref 1.8–7.7)
NEUTROPHILS NFR BLD: 76 % (ref 38–73)
NITRITE UR QL STRIP: NEGATIVE
NRBC BLD-RTO: 0 /100 WBC
PH UR STRIP: 5 [PH] (ref 5–8)
PHOSPHATE SERPL-MCNC: 2.2 MG/DL (ref 2.7–4.5)
PLATELET # BLD AUTO: 251 K/UL (ref 150–450)
PMV BLD AUTO: 9 FL (ref 9.2–12.9)
POCT GLUCOSE: 121 MG/DL (ref 70–110)
POCT GLUCOSE: 140 MG/DL (ref 70–110)
POCT GLUCOSE: 145 MG/DL (ref 70–110)
POCT GLUCOSE: 145 MG/DL (ref 70–110)
POCT GLUCOSE: 180 MG/DL (ref 70–110)
POTASSIUM SERPL-SCNC: 4 MMOL/L (ref 3.5–5.1)
PROT SERPL-MCNC: 7.1 G/DL (ref 6–8.4)
PROT UR QL STRIP: ABNORMAL
PROTHROMBIN TIME: 11.2 SEC (ref 9–12.5)
PV PEAK VELOCITY: 1.2 CM/S
RA MAJOR: 2.42 CM
RA PRESSURE: 3 MMHG
RA WIDTH: 2.66 CM
RBC # BLD AUTO: 4.53 M/UL (ref 4.6–6.2)
RPR SER QL: NORMAL
SARS-COV-2 RDRP RESP QL NAA+PROBE: NEGATIVE
SODIUM SERPL-SCNC: 141 MMOL/L (ref 136–145)
SP GR UR STRIP: 1.02 (ref 1–1.03)
TDI LATERAL: 0.08 M/S
TRICUSPID ANNULAR PLANE SYSTOLIC EXCURSION: 1.17 CM
URN SPEC COLLECT METH UR: ABNORMAL
UROBILINOGEN UR STRIP-ACNC: ABNORMAL EU/DL
VIT B12 SERPL-MCNC: 357 PG/ML (ref 210–950)
WBC # BLD AUTO: 5.22 K/UL (ref 3.9–12.7)

## 2022-09-01 PROCEDURE — 25000003 PHARM REV CODE 250

## 2022-09-01 PROCEDURE — 85025 COMPLETE CBC W/AUTO DIFF WBC: CPT | Performed by: STUDENT IN AN ORGANIZED HEALTH CARE EDUCATION/TRAINING PROGRAM

## 2022-09-01 PROCEDURE — 84100 ASSAY OF PHOSPHORUS: CPT | Performed by: STUDENT IN AN ORGANIZED HEALTH CARE EDUCATION/TRAINING PROGRAM

## 2022-09-01 PROCEDURE — 99223 PR INITIAL HOSPITAL CARE,LEVL III: ICD-10-PCS | Mod: ,,, | Performed by: NURSE PRACTITIONER

## 2022-09-01 PROCEDURE — C1751 CATH, INF, PER/CENT/MIDLINE: HCPCS

## 2022-09-01 PROCEDURE — 63600175 PHARM REV CODE 636 W HCPCS: Performed by: STUDENT IN AN ORGANIZED HEALTH CARE EDUCATION/TRAINING PROGRAM

## 2022-09-01 PROCEDURE — 83735 ASSAY OF MAGNESIUM: CPT | Performed by: STUDENT IN AN ORGANIZED HEALTH CARE EDUCATION/TRAINING PROGRAM

## 2022-09-01 PROCEDURE — G0378 HOSPITAL OBSERVATION PER HR: HCPCS

## 2022-09-01 PROCEDURE — 36410 VNPNXR 3YR/> PHY/QHP DX/THER: CPT

## 2022-09-01 PROCEDURE — 82607 VITAMIN B-12: CPT | Performed by: STUDENT IN AN ORGANIZED HEALTH CARE EDUCATION/TRAINING PROGRAM

## 2022-09-01 PROCEDURE — 25500020 PHARM REV CODE 255: Performed by: EMERGENCY MEDICINE

## 2022-09-01 PROCEDURE — 36415 COLL VENOUS BLD VENIPUNCTURE: CPT | Performed by: FAMILY MEDICINE

## 2022-09-01 PROCEDURE — 92610 EVALUATE SWALLOWING FUNCTION: CPT

## 2022-09-01 PROCEDURE — 94761 N-INVAS EAR/PLS OXIMETRY MLT: CPT

## 2022-09-01 PROCEDURE — 80053 COMPREHEN METABOLIC PANEL: CPT | Performed by: STUDENT IN AN ORGANIZED HEALTH CARE EDUCATION/TRAINING PROGRAM

## 2022-09-01 PROCEDURE — 99223 1ST HOSP IP/OBS HIGH 75: CPT | Mod: ,,, | Performed by: NURSE PRACTITIONER

## 2022-09-01 PROCEDURE — 86592 SYPHILIS TEST NON-TREP QUAL: CPT | Performed by: STUDENT IN AN ORGANIZED HEALTH CARE EDUCATION/TRAINING PROGRAM

## 2022-09-01 PROCEDURE — 85730 THROMBOPLASTIN TIME PARTIAL: CPT | Mod: 91 | Performed by: FAMILY MEDICINE

## 2022-09-01 PROCEDURE — 25000003 PHARM REV CODE 250: Performed by: STUDENT IN AN ORGANIZED HEALTH CARE EDUCATION/TRAINING PROGRAM

## 2022-09-01 PROCEDURE — 63600175 PHARM REV CODE 636 W HCPCS: Performed by: FAMILY MEDICINE

## 2022-09-01 PROCEDURE — 85730 THROMBOPLASTIN TIME PARTIAL: CPT | Performed by: STUDENT IN AN ORGANIZED HEALTH CARE EDUCATION/TRAINING PROGRAM

## 2022-09-01 PROCEDURE — 99223 1ST HOSP IP/OBS HIGH 75: CPT | Mod: ,,, | Performed by: INTERNAL MEDICINE

## 2022-09-01 PROCEDURE — 25000003 PHARM REV CODE 250: Performed by: FAMILY MEDICINE

## 2022-09-01 PROCEDURE — 87040 BLOOD CULTURE FOR BACTERIA: CPT | Performed by: EMERGENCY MEDICINE

## 2022-09-01 PROCEDURE — 85610 PROTHROMBIN TIME: CPT | Performed by: STUDENT IN AN ORGANIZED HEALTH CARE EDUCATION/TRAINING PROGRAM

## 2022-09-01 PROCEDURE — 99223 PR INITIAL HOSPITAL CARE,LEVL III: ICD-10-PCS | Mod: ,,, | Performed by: INTERNAL MEDICINE

## 2022-09-01 PROCEDURE — A4216 STERILE WATER/SALINE, 10 ML: HCPCS | Performed by: FAMILY MEDICINE

## 2022-09-01 PROCEDURE — 25000242 PHARM REV CODE 250 ALT 637 W/ HCPCS: Performed by: STUDENT IN AN ORGANIZED HEALTH CARE EDUCATION/TRAINING PROGRAM

## 2022-09-01 RX ORDER — LIDOCAINE 50 MG/G
1 PATCH TOPICAL DAILY PRN
Status: DISCONTINUED | OUTPATIENT
Start: 2022-09-01 | End: 2022-09-04 | Stop reason: HOSPADM

## 2022-09-01 RX ORDER — ACETAMINOPHEN 325 MG/1
650 TABLET ORAL EVERY 4 HOURS PRN
Status: DISCONTINUED | OUTPATIENT
Start: 2022-09-01 | End: 2022-09-04 | Stop reason: HOSPADM

## 2022-09-01 RX ORDER — IBUPROFEN 200 MG
24 TABLET ORAL
Status: DISCONTINUED | OUTPATIENT
Start: 2022-09-01 | End: 2022-09-04 | Stop reason: HOSPADM

## 2022-09-01 RX ORDER — TAMSULOSIN HYDROCHLORIDE 0.4 MG/1
0.4 CAPSULE ORAL NIGHTLY
Status: DISCONTINUED | OUTPATIENT
Start: 2022-09-01 | End: 2022-09-01

## 2022-09-01 RX ORDER — NALOXONE HCL 0.4 MG/ML
0.02 VIAL (ML) INJECTION
Status: DISCONTINUED | OUTPATIENT
Start: 2022-09-01 | End: 2022-09-04 | Stop reason: HOSPADM

## 2022-09-01 RX ORDER — ATORVASTATIN CALCIUM 20 MG/1
20 TABLET, FILM COATED ORAL NIGHTLY
Status: DISCONTINUED | OUTPATIENT
Start: 2022-09-01 | End: 2022-09-01

## 2022-09-01 RX ORDER — SODIUM CHLORIDE 0.9 % (FLUSH) 0.9 %
10 SYRINGE (ML) INJECTION
Status: DISCONTINUED | OUTPATIENT
Start: 2022-09-01 | End: 2022-09-04 | Stop reason: HOSPADM

## 2022-09-01 RX ORDER — FLUTICASONE PROPIONATE 50 MCG
1 SPRAY, SUSPENSION (ML) NASAL DAILY
Status: DISCONTINUED | OUTPATIENT
Start: 2022-09-01 | End: 2022-09-04 | Stop reason: HOSPADM

## 2022-09-01 RX ORDER — RISPERIDONE 0.25 MG/1
1 TABLET ORAL DAILY
Status: DISCONTINUED | OUTPATIENT
Start: 2022-09-02 | End: 2022-09-01

## 2022-09-01 RX ORDER — TIMOLOL MALEATE 5 MG/ML
1 SOLUTION/ DROPS OPHTHALMIC 2 TIMES DAILY
Status: DISCONTINUED | OUTPATIENT
Start: 2022-09-01 | End: 2022-09-04 | Stop reason: HOSPADM

## 2022-09-01 RX ORDER — RISPERIDONE 0.25 MG/1
2 TABLET ORAL NIGHTLY
Status: DISCONTINUED | OUTPATIENT
Start: 2022-09-01 | End: 2022-09-01

## 2022-09-01 RX ORDER — MEGESTROL ACETATE 40 MG/ML
800 SUSPENSION ORAL DAILY
Status: DISCONTINUED | OUTPATIENT
Start: 2022-09-01 | End: 2022-09-01

## 2022-09-01 RX ORDER — PREDNISOLONE ACETATE 10 MG/ML
1 SUSPENSION/ DROPS OPHTHALMIC NIGHTLY
Status: DISCONTINUED | OUTPATIENT
Start: 2022-09-01 | End: 2022-09-04 | Stop reason: HOSPADM

## 2022-09-01 RX ORDER — INSULIN ASPART 100 [IU]/ML
1-10 INJECTION, SOLUTION INTRAVENOUS; SUBCUTANEOUS
Status: DISCONTINUED | OUTPATIENT
Start: 2022-09-01 | End: 2022-09-04 | Stop reason: HOSPADM

## 2022-09-01 RX ORDER — SODIUM CHLORIDE 0.9 % (FLUSH) 0.9 %
5 SYRINGE (ML) INJECTION
Status: DISCONTINUED | OUTPATIENT
Start: 2022-09-01 | End: 2022-09-04 | Stop reason: HOSPADM

## 2022-09-01 RX ORDER — IBUPROFEN 200 MG
16 TABLET ORAL
Status: DISCONTINUED | OUTPATIENT
Start: 2022-09-01 | End: 2022-09-04 | Stop reason: HOSPADM

## 2022-09-01 RX ORDER — AMOXICILLIN 250 MG
1 CAPSULE ORAL 2 TIMES DAILY
Status: DISCONTINUED | OUTPATIENT
Start: 2022-09-01 | End: 2022-09-01

## 2022-09-01 RX ORDER — TALC
9 POWDER (GRAM) TOPICAL NIGHTLY PRN
Status: DISCONTINUED | OUTPATIENT
Start: 2022-09-01 | End: 2022-09-04 | Stop reason: HOSPADM

## 2022-09-01 RX ORDER — SODIUM CHLORIDE 0.9 % (FLUSH) 0.9 %
10 SYRINGE (ML) INJECTION EVERY 6 HOURS
Status: DISCONTINUED | OUTPATIENT
Start: 2022-09-01 | End: 2022-09-04 | Stop reason: HOSPADM

## 2022-09-01 RX ORDER — SODIUM,POTASSIUM PHOSPHATES 280-250MG
1 POWDER IN PACKET (EA) ORAL EVERY 6 HOURS
Status: COMPLETED | OUTPATIENT
Start: 2022-09-01 | End: 2022-09-02

## 2022-09-01 RX ORDER — FINASTERIDE 5 MG/1
5 TABLET, FILM COATED ORAL DAILY
Status: DISCONTINUED | OUTPATIENT
Start: 2022-09-01 | End: 2022-09-01

## 2022-09-01 RX ORDER — ENOXAPARIN SODIUM 100 MG/ML
40 INJECTION SUBCUTANEOUS EVERY 24 HOURS
Status: DISCONTINUED | OUTPATIENT
Start: 2022-09-01 | End: 2022-09-01

## 2022-09-01 RX ORDER — GLUCAGON 1 MG
1 KIT INJECTION
Status: DISCONTINUED | OUTPATIENT
Start: 2022-09-01 | End: 2022-09-04 | Stop reason: HOSPADM

## 2022-09-01 RX ORDER — LATANOPROST 50 UG/ML
1 SOLUTION/ DROPS OPHTHALMIC NIGHTLY
Status: DISCONTINUED | OUTPATIENT
Start: 2022-09-01 | End: 2022-09-04 | Stop reason: HOSPADM

## 2022-09-01 RX ORDER — LOSARTAN POTASSIUM 50 MG/1
100 TABLET ORAL DAILY
Status: DISCONTINUED | OUTPATIENT
Start: 2022-09-01 | End: 2022-09-01

## 2022-09-01 RX ORDER — HEPARIN SODIUM,PORCINE/D5W 25000/250
0-40 INTRAVENOUS SOLUTION INTRAVENOUS CONTINUOUS
Status: DISCONTINUED | OUTPATIENT
Start: 2022-09-01 | End: 2022-09-02

## 2022-09-01 RX ORDER — ZINC SULFATE 50(220)MG
220 CAPSULE ORAL DAILY
Status: DISCONTINUED | OUTPATIENT
Start: 2022-09-01 | End: 2022-09-01

## 2022-09-01 RX ORDER — RISPERIDONE 0.25 MG/1
1 TABLET ORAL DAILY
Status: DISCONTINUED | OUTPATIENT
Start: 2022-09-01 | End: 2022-09-01

## 2022-09-01 RX ORDER — ACETAMINOPHEN 325 MG/1
650 TABLET ORAL EVERY 8 HOURS PRN
Status: DISCONTINUED | OUTPATIENT
Start: 2022-09-01 | End: 2022-09-04 | Stop reason: HOSPADM

## 2022-09-01 RX ADMIN — SODIUM CHLORIDE, SODIUM LACTATE, POTASSIUM CHLORIDE, AND CALCIUM CHLORIDE 500 ML: .6; .31; .03; .02 INJECTION, SOLUTION INTRAVENOUS at 12:09

## 2022-09-01 RX ADMIN — PIPERACILLIN AND TAZOBACTAM 4.5 G: 4; .5 INJECTION, POWDER, LYOPHILIZED, FOR SOLUTION INTRAVENOUS; PARENTERAL at 04:09

## 2022-09-01 RX ADMIN — POTASSIUM & SODIUM PHOSPHATES POWDER PACK 280-160-250 MG 1 PACKET: 280-160-250 PACK at 06:09

## 2022-09-01 RX ADMIN — HEPARIN SODIUM 18 UNITS/KG/HR: 10000 INJECTION, SOLUTION INTRAVENOUS at 06:09

## 2022-09-01 RX ADMIN — IOHEXOL 100 ML: 350 INJECTION, SOLUTION INTRAVENOUS at 01:09

## 2022-09-01 RX ADMIN — SODIUM CHLORIDE, SODIUM LACTATE, POTASSIUM CHLORIDE, AND CALCIUM CHLORIDE 500 ML: .6; .31; .03; .02 INJECTION, SOLUTION INTRAVENOUS at 05:09

## 2022-09-01 RX ADMIN — VANCOMYCIN HYDROCHLORIDE 1500 MG: 1.5 INJECTION, POWDER, LYOPHILIZED, FOR SOLUTION INTRAVENOUS at 02:09

## 2022-09-01 RX ADMIN — PIPERACILLIN AND TAZOBACTAM 4.5 G: 4; .5 INJECTION, POWDER, LYOPHILIZED, FOR SOLUTION INTRAVENOUS; PARENTERAL at 06:09

## 2022-09-01 RX ADMIN — FLUTICASONE PROPIONATE 50 MCG: 50 SPRAY, METERED NASAL at 12:09

## 2022-09-01 RX ADMIN — TIMOLOL MALEATE 1 DROP: 5 SOLUTION/ DROPS OPHTHALMIC at 12:09

## 2022-09-01 RX ADMIN — TIMOLOL MALEATE 1 DROP: 5 SOLUTION/ DROPS OPHTHALMIC at 09:09

## 2022-09-01 RX ADMIN — HEPARIN SODIUM 14 UNITS/KG/HR: 10000 INJECTION, SOLUTION INTRAVENOUS at 04:09

## 2022-09-01 RX ADMIN — LATANOPROST 1 DROP: 50 SOLUTION OPHTHALMIC at 09:09

## 2022-09-01 RX ADMIN — SODIUM CHLORIDE, PRESERVATIVE FREE 10 ML: 5 INJECTION INTRAVENOUS at 06:09

## 2022-09-01 RX ADMIN — PREDNISOLONE ACETATE 1 DROP: 10 SUSPENSION/ DROPS OPHTHALMIC at 09:09

## 2022-09-01 NOTE — NURSING
Patient arrived to this nurse via ems to this patients room. At receipt of patient, patient was on a nonrebreather high flow 25 l . Patient spo2 was 100 percent. BP 88/53. Temp 98.8. . Patient for this nurse would repsond to painful stimuli after repeat stimulation, minimal grimmace.     Patient hadnoff report was stated the patient baseline is alert ad oriented x 4 per facility report to EMS. EMS was told patient had not been feeding self for the past week and has hardly eaten at all.     At this time, patient is now slowly resonding to answers minimally. Patient is currently alert and oriented x 4 with delay in resonse. Eyes deviate toward the ceiling. This nurse currenlty unable to determine the patients baseline status.

## 2022-09-01 NOTE — CARE UPDATE
"Spoke to patient's daughter, Hallie Franklin, and provided update to patient's current plan of care. Daughter reports that she has noticed a decline in her father's mental status gradually over the last 2-3 months. She reports she thought he may have been depressed during this decline as she had moved and was unable to see her father as frequently as she did before. Hallie does report that his father has felt like he was nearing "the end."     Discussed code status with Hallie as pt is currently not at reported baseline per Hallie. Pt currently falling in and out of sleep, wakes up on occasion to name calling. He is not answering questions such as what's his name or what is the year. Hallie verbalizes that her father would not have wanted chest compressions, pressor support, intubation, or ventilator support. Explained that pt will be DNR status, and Hallie confirmed she understands this and wants pt to be DNR.     All questions answered. Daughter has phone number to nursing station for any further questions.    Ferdinand Vyas MD  [facility], PGY-3  "

## 2022-09-01 NOTE — NURSING
Altered skin integrity SOS [id number] all wounds present on admit,right ear,sacral,scrotal,coccyx,scabs to arms,multiple dti to hips, feet,ankles,foam dressings applied and green zflex boots applied,turned pt w wedge.

## 2022-09-01 NOTE — SUBJECTIVE & OBJECTIVE
Past Medical History:   Diagnosis Date    Essential hypertension     Legal blindness     Preglaucoma     Type 2 diabetes mellitus        No past surgical history on file.    Review of patient's allergies indicates:  No Known Allergies    No current facility-administered medications on file prior to encounter.     Current Outpatient Medications on File Prior to Encounter   Medication Sig    acetaminophen (TYLENOL) 500 MG tablet Take 500 mg by mouth 2 (two) times a day.    ALPHAGAN P 0.1 % Drop Place 1 drop into both eyes 2 (two) times a day.    atorvastatin (LIPITOR) 20 MG tablet Take 20 mg by mouth every evening.    dorzolamide (TRUSOPT) 2 % ophthalmic solution Place 1 drop into both eyes 2 (two) times a day.    finasteride (PROSCAR) 5 mg tablet Take 5 mg by mouth once daily.    losartan (COZAAR) 100 MG tablet Take 100 mg by mouth once daily.    fluticasone propionate (FLONASE) 50 mcg/actuation nasal spray 1 spray by Each Nostril route once daily.    latanoprost 0.005 % ophthalmic solution Place into both eyes every evening.    megestroL (MEGACE) 400 mg/10 mL (40 mg/mL) Susp Take 800 mg by mouth Daily.    multivitamin (THERAGRAN) per tablet Take 1 tablet by mouth once daily.    prednisoLONE acetate (PRED FORTE) 1 % DrpS Place 1 drop into both eyes every evening.    risperiDONE (RISPERDAL) 1 MG tablet Take 1 mg by mouth once daily.    risperiDONE (RISPERDAL) 2 MG tablet Take 2 mg by mouth nightly.    tamsulosin (FLOMAX) 0.4 mg Cap Take 0.4 mg by mouth every evening.    timolol maleate 0.5% (TIMOPTIC) 0.5 % Drop Place 1 drop into both eyes 2 (two) times daily.    zinc sulfate (ZINCATE) 50 mg zinc (220 mg) capsule Take 220 mg by mouth once daily.     Family History    None       Tobacco Use    Smoking status: Former    Smokeless tobacco: Never   Substance and Sexual Activity    Alcohol use: Not Currently    Drug use: Not Currently    Sexual activity: Not on file     Review of Systems   Unable to perform ROS: Mental  status change   Objective:     Vital Signs (Most Recent):  Temp: (P) 98.6 °F (37 °C) (09/01/22 0200)  Pulse: 95 (09/01/22 0230)  Resp: (P) 16 (09/01/22 0200)  BP: 135/76 (09/01/22 0230)  SpO2: (P) 100 % (09/01/22 0200) Vital Signs (24h Range):  Temp:  [98.6 °F (37 °C)-98.8 °F (37.1 °C)] (P) 98.6 °F (37 °C)  Pulse:  [] 95  Resp:  [16] (P) 16  SpO2:  [100 %] (P) 100 %  BP: (110-135)/(70-76) 135/76     Weight: 79.4 kg (175 lb)  Body mass index is 24.41 kg/m².    Physical Exam  Constitutional:       General: He is not in acute distress.     Comments: Somnolent   HENT:      Head: Normocephalic.      Right Ear: External ear normal.      Left Ear: External ear normal.      Nose: Nose normal.      Mouth/Throat:      Mouth: Mucous membranes are dry.   Eyes:      Comments: Patient is blind   Cardiovascular:      Rate and Rhythm: Normal rate.      Pulses: Normal pulses.   Pulmonary:      Effort: Pulmonary effort is normal.   Abdominal:      Palpations: Abdomen is soft.   Musculoskeletal:      Cervical back: Normal range of motion.   Skin:     Capillary Refill: Capillary refill takes less than 2 seconds.   Neurological:      Comments: Aox2 person and place.  Able to respond to questions and commands           Significant Labs: All pertinent labs within the past 24 hours have been reviewed.  ABGs:   Recent Labs   Lab 08/31/22 2200   PH 7.411   PCO2 36.1   HCO3 22.9*   POCSATURATED 100   BE -2   PO2 387*     CBC:   Recent Labs   Lab 08/31/22 2252   WBC 5.51   HGB 11.4*   HCT 35.2*        CMP:   Recent Labs   Lab 08/31/22 2252      K 4.1      CO2 19*   *   BUN 30*   CREATININE 0.8   CALCIUM 8.9   PROT 6.6   ALBUMIN 2.9*   BILITOT 0.9   ALKPHOS 101   AST 28   ALT 28   ANIONGAP 14     Cardiac Markers:   Recent Labs   Lab 08/31/22  2252   BNP 10     Troponin:   Recent Labs   Lab 08/31/22  2252   TROPONINI 0.007       Significant Imaging: I have reviewed all pertinent imaging results/findings within  the past 24 hours.  CT: I have reviewed all pertinent results/findings within the past 24 hours and my personal findings are:  NO acute intracerebral pathology  CTA: Signs of 2 acute subsegmental pulmonary emboli  in the left lung

## 2022-09-01 NOTE — ED PROVIDER NOTES
Encounter Date: 8/31/2022    SCRIBE #1 NOTE: I, Rey Spangler, am scribing for, and in the presence of,  Dr. Box. I have scribed the following portions of the note - Other sections scribed: HPI.     History     Chief Complaint   Patient presents with    Weakness     Pt arrives via EMS from [facility], per [facility] staff pt has been more lethargic and not eating x1 week, Also reports pt is more confused than normal, staff reports pt baseline is Aox4. Pt is hypotensive upon arrival.     Adalberto Medina is a 82 y.o. male with a past medical history of hypertension, Legal blindness, Preglaucoma, and Type 2 diabetes mellitus.    The patient presents to the ED via EMS from [facility] for evaluation of altered mental status. Nursing home staff reports that patient has been lethargic and confused x1 week. They also report patient has not been eating for the last week. Patient's family and home staff report that he is Aox4 at baseline.  EMS reports patient 's initial SpO2 was 83% and BGC was 126. Patient hypotensive upon arrival.      The history is provided by the EMS personnel and the nursing home. The history is limited by the condition of the patient. No  was used.   Review of patient's allergies indicates:  No Known Allergies  Past Medical History:   Diagnosis Date    Essential hypertension     Legal blindness     Preglaucoma     Type 2 diabetes mellitus      No past surgical history on file.  No family history on file.  Social History     Tobacco Use    Smoking status: Former    Smokeless tobacco: Never   Substance Use Topics    Alcohol use: Not Currently    Drug use: Not Currently     Review of Systems   Unable to perform ROS: Mental status change     Physical Exam     Initial Vitals [08/31/22 2345]   BP Pulse Resp Temp SpO2   110/70 100 16 98.8 °F (37.1 °C) 100 %      MAP       --         Physical Exam    Nursing note and vitals reviewed.  Constitutional:   Elderly, chronically  ill-appearing, acute distress   HENT:   Head: Normocephalic and atraumatic.   Eyes: Pupils are equal, round, and reactive to light.   Neck:   Normal range of motion.  Cardiovascular:            Tachycardic rate and rhythm   Pulmonary/Chest:   Tachypneic, rapid breathing, no overt crackles or wheezing, on 15 L non-rebreather   Abdominal: Abdomen is soft. He exhibits no distension. There is no abdominal tenderness.   Musculoskeletal:      Cervical back: Normal range of motion.      Comments: Contractures and cachexia the extremities noted    Sacral decubitus ulcer noted     Neurological:   Hyper somnolent, will move extremities, not responsive to verbal or painful stimuli   Skin: Skin is warm and dry. Capillary refill takes less than 2 seconds.   Psychiatric:   Unable to assess         ED Course   Procedures  Labs Reviewed   CBC W/ AUTO DIFFERENTIAL - Abnormal; Notable for the following components:       Result Value    RBC 4.16 (*)     Hemoglobin 11.4 (*)     Hematocrit 35.2 (*)     MPV 9.0 (*)     Gran % 74.6 (*)     All other components within normal limits   COMPREHENSIVE METABOLIC PANEL - Abnormal; Notable for the following components:    CO2 19 (*)     Glucose 152 (*)     BUN 30 (*)     Albumin 2.9 (*)     All other components within normal limits   URINALYSIS, REFLEX TO URINE CULTURE - Abnormal; Notable for the following components:    Protein, UA Trace (*)     Ketones, UA Trace (*)     Urobilinogen, UA 2.0-3.0 (*)     All other components within normal limits    Narrative:     Specimen Source->Urine   ISTAT PROCEDURE - Abnormal; Notable for the following components:    POC PO2 387 (*)     POC HCO3 22.9 (*)     All other components within normal limits   POCT GLUCOSE - Abnormal; Notable for the following components:    POCT Glucose 145 (*)     All other components within normal limits   CULTURE, BLOOD   CULTURE, BLOOD   LACTIC ACID, PLASMA   B-TYPE NATRIURETIC PEPTIDE   LIPASE   TROPONIN I   PROCALCITONIN    APTT   PROTIME-INR   MAGNESIUM   AMMONIA   COMPREHENSIVE METABOLIC PANEL   MAGNESIUM   PHOSPHORUS   SARS-COV-2 RDRP GENE          Imaging Results               CTA Chest Non-Coronary (PE Study) (Final result)  Result time 09/01/22 03:07:10      Final result by Twin Verma MD (09/01/22 03:07:10)                   Impression:      Findings consistent with small vessel acute pulmonary emboli on the left, as discussed above.    Chronic appearing lung changes as discussed above, additional areas of opacity may relate to chronic change however follow-up is recommended.    The findings were reported to Dr. Box in the ER at the time of dictation.    This report was flagged in Epic as abnormal.      Electronically signed by: Twin Verma  Date:    09/01/2022  Time:    03:07               Narrative:    EXAMINATION:  CTA CHEST NON CORONARY    CLINICAL HISTORY:  Pulmonary embolism (PE) suspected, high prob;    TECHNIQUE:  Low dose axial images, sagittal and coronal reformations were obtained from the thoracic inlet to the lung bases following the IV administration of 100 mL of Omnipaque 350.  Contrast timing was optimized to evaluate the pulmonary arteries.  MIP images were performed.    COMPARISON:  Chest radiograph September 1, 2022    FINDINGS:  There are small pulmonary artery filling defects noted on the left, at the left lung base anteriorly as well as posteriorly, as seen on axial images 281 through 307, series 2 anteriorly, and axial images 316 through 322 posteriorly at the left base.  These are consistent with pulmonary emboli.  There is no large central saddle type pulmonary embolus.    The thoracic aorta demonstrates appropriate opacification.  Atherosclerotic change noted.  There is elevation of the right hemidiaphragm.  The lungs demonstrate prominent chronic appearing change, there are bandlike areas of scarring, there are chronic interstitial changes.  There are bronchiectatic changes noted.   There are some bronchi that demonstrate opacity, these may relate to chronic changes however may relate to secretions or mucous plugging.  There are areas of opacity extending from the right suprahilar region superiorly, and to a lesser extent from the left hilum, these are thought likely sequelae of chronic change, however follow-up is recommended.    There is no evidence for pleural effusion and there is no evidence for pneumothorax.    Limited imaging of the upper abdomen demonstrates no evidence for acute upper abdominal process.    The osseous structures demonstrate chronic change with diminished mineralization and degenerative change.                                       X-Ray Chest AP Portable (Final result)  Result time 09/01/22 00:54:11      Final result by Twin Verma MD (09/01/22 00:54:11)                   Impression:      Chronic and atelectatic changes are noted appearing similar to the prior examination without evidence for superimposed acute intrathoracic process.      Electronically signed by: Twin Verma  Date:    09/01/2022  Time:    00:54               Narrative:    EXAMINATION:  XR CHEST AP PORTABLE    CLINICAL HISTORY:  Sepsis;    TECHNIQUE:  Single frontal view of the chest was performed.    COMPARISON:  Chest radiograph August 22, 2022    FINDINGS:  Single portable chest view is submitted.  There is elevation of the right hemidiaphragm and there is rotation.  When accounting for these factors the cardiomediastinal silhouette appears stable.    Chronic appearing lung changes are noted, the overall appearance is similar to the prior examination.  There appear to be mild atelectatic changes superimposed at the right lung base.  There is no dense consolidation, significant pleural effusion or pneumothorax.  The visualized osseous structures demonstrate chronic change.                                       CT Head Without Contrast (Final result)  Result time 08/31/22 23:37:27      Final  result by Ulises Short MD (08/31/22 23:37:27)                   Impression:      Stable CT of the brain with no interval developing hemorrhage, mass or infarction.    Mild sphenoid sinus disease.  Stable.      Electronically signed by: Ulises Short  Date:    08/31/2022  Time:    23:37               Narrative:    EXAMINATION:  CT HEAD WITHOUT CONTRAST    CLINICAL HISTORY:  Mental status change, unknown cause;    TECHNIQUE:  Low dose axial images were obtained through the head.  Coronal and sagittal reformations were also performed. Contrast was not administered.    COMPARISON:  08/22/2022 CT brain    FINDINGS:  Involutional changes manifested by prominence of the cortical sulcal markings basilar cisterns and ventricular system are again noted.  Extensive low density in the deep and subcortical white matter compatible with chronic small vessel changes are also again noted.    There is no new loss of gray-white matter junction differentiation, mass, mass effect or pathologic fluid collection.    Minimal mucosal thickening and/or fluid is noted in the left sphenoid sinuses.                                       Medications   atorvastatin tablet 20 mg (has no administration in time range)   finasteride tablet 5 mg (has no administration in time range)   fluticasone propionate 50 mcg/actuation nasal spray 50 mcg (has no administration in time range)   latanoprost 0.005 % ophthalmic solution 1 drop (has no administration in time range)   losartan tablet 100 mg (has no administration in time range)   megestroL 400 mg/10 mL (10 mL) suspension 800 mg (has no administration in time range)   prednisoLONE acetate 1 % ophthalmic suspension 1 drop (has no administration in time range)   risperiDONE tablet 2 mg (has no administration in time range)   tamsulosin 24 hr capsule 0.4 mg (has no administration in time range)   timolol maleate 0.5% ophthalmic solution 1 drop (has no administration in time range)   zinc sulfate  capsule 220 mg (has no administration in time range)   sodium chloride 0.9% flush 5 mL (has no administration in time range)   melatonin tablet 9 mg (has no administration in time range)   senna-docusate 8.6-50 mg per tablet 1 tablet (has no administration in time range)   acetaminophen tablet 650 mg (has no administration in time range)   acetaminophen tablet 650 mg (has no administration in time range)   naloxone 0.4 mg/mL injection 0.02 mg (has no administration in time range)   LIDOcaine 5 % patch 1 patch (has no administration in time range)   insulin aspart U-100 pen 1-10 Units (has no administration in time range)   glucose chewable tablet 16 g (has no administration in time range)   glucose chewable tablet 24 g (has no administration in time range)   glucagon (human recombinant) injection 1 mg (has no administration in time range)   dextrose 10% bolus 125 mL (has no administration in time range)   dextrose 10% bolus 250 mL (has no administration in time range)   risperiDONE tablet 1 mg (has no administration in time range)   rivaroxaban tablet 15 mg (has no administration in time range)   lactated ringers bolus 2,259 mL (0 mLs Intravenous Stopped 8/31/22 2245)   cefepime in dextrose 5 % IVPB 2 g (0 g Intravenous Stopped 8/31/22 2300)   iohexoL (OMNIPAQUE 350) injection 100 mL (100 mLs Intravenous Given 9/1/22 0156)     Medical Decision Making:   Initial Assessment:   82 year old male with multiple medical problems presents the ER for evaluation of altered mental status weakness fatigue hypotension.  Per EMS they report family reports worsening functional patient acting himself he is nonverbal.  Unsure of his true baseline status.  He was apparently hypoxemic altered , and hypotensive with EMS.  Was placed on non-rebreather and brought patient to the ER.  Patient came the ER he is cachectic ill-appearing minimally responsive breath sounds bilaterally sacral ulcer noted.  Unable to obtain further information  based on patient condition.  Concerned sepsis electrolyte abnormality other cause.  Will plan blood work symptomatic support reassess.  Clinical Tests:   Lab Tests: Ordered and Reviewed  Radiological Study: Ordered and Reviewed  Medical Tests: Ordered and Reviewed        Scribe Attestation:   Scribe #1: I performed the above scribed service and the documentation accurately describes the services I performed. I attest to the accuracy of the note.      ED Course as of 09/01/22 0345   Wed Aug 31, 2022   2309 Tach 103 beats per minute, chronic left bundle possible STEMI based on Sgarbossa criteria in V1 V2, discussed with Cardiology.  Patient is a very poor candidate for catheterization given history of multiple chronic medical conditions.  Will plan to observe and treat medically. [SE]   2322 Stable on 2 L nasal cannula [SE]   Thu Sep 01, 2022   0104 Resting bed no acute distress patient is stable,.  No source of hypoxia and hypotension.  Will plan CTA, and reassess. [SE]   0305 Received call from Radiology, patient has 2 left-sided PEs.  He is currently hemodynamically stable.  Informed medicine team who is aware. [SE]      ED Course User Index  [SE] Natty Box MD               Clinical Impression:   Final diagnoses:  [R41.82] Altered mental status  [I95.9] Hypotension, unspecified hypotension type (Primary)  [I26.99] Other acute pulmonary embolism without acute cor pulmonale      ED Disposition Condition    Observation         My Scribe Attestation: I acknowledge that the documentation on this chart was provided by described on the date of service noted above and that the documentation in the chart accurately reflects work and decisions made by me alone.          Natty Box MD  09/01/22 034

## 2022-09-01 NOTE — PLAN OF CARE
Problem: SLP  Goal: SLP Goal  Description: Short Term Goals:  1. Pt will participate in swallow eval to determine safest diet level  2. Pt will tolerate clear liquid diet with no audible dysphagia.   Outcome: Ongoing, Progressing   ST orders received, pt with limited intake of PO and limited communication noted. Pt may initiate clear liquid diet for now with assist for feeding. Pt will also benefit from Palliative Consult. Notified team of above

## 2022-09-01 NOTE — H&P
[facility] Medicine  History & Physical    Patient Name: Adalberto Medina  MRN: [id number]  Patient Class: OP- Observation  Admission Date: 8/31/2022  Attending Physician: López Mota III, MD   Primary Care Provider: Primary Doctor No         Patient information was obtained from relative(s), past medical records and ER records.     Subjective:     Principal Problem:Multiple subsegmental pulmonary emboli without acute cor pulmonale    Chief Complaint:   Chief Complaint   Patient presents with    Weakness     Pt arrives via EMS from [facility], per [facility] staff pt has been more lethargic and not eating x1 week, Also reports pt is more confused than normal, staff reports pt baseline is Aox4. Pt is hypotensive upon arrival.        HPI: 82-year-old male with a past medical history of legal blindness, type 2 diabetes, essential hypertension, sacral wounds presents to the ED for altered mental status and shortness of breath. Patient was brought to the ED from [facility] due to change in mentation and activity.  Nursing home staff reports patient has not been eating much for the past week, and has become increasingly withdrawn and unresponsive. Patient's baseline is reported as Aox4 and fully conversant. Patient has been seen recently for a similar presentation and was treated for an UTI. Patient was hypoxic and minimally responsive on arrival to ED.     In ED patient was started on non-re breather mask O2 and given IV fluid bolus. Lab results did not indicate any acute infection, and CT head showed no signs of acute infarct or hemorrhage. Patients mental stats improved during his stay in the ED. A CTA chest was ordered and showed the presence of 2 pulmonary emboli. Patient is now satting 100% on room and responds to questions. Patient to be admitted to [facility]  for management of resolving PE and AMS.      Past Medical History:   Diagnosis Date    Essential hypertension     Legal blindness     Preglaucoma      Type 2 diabetes mellitus        No past surgical history on file.    Review of patient's allergies indicates:  No Known Allergies    No current facility-administered medications on file prior to encounter.     Current Outpatient Medications on File Prior to Encounter   Medication Sig    acetaminophen (TYLENOL) 500 MG tablet Take 500 mg by mouth 2 (two) times a day.    ALPHAGAN P 0.1 % Drop Place 1 drop into both eyes 2 (two) times a day.    atorvastatin (LIPITOR) 20 MG tablet Take 20 mg by mouth every evening.    dorzolamide (TRUSOPT) 2 % ophthalmic solution Place 1 drop into both eyes 2 (two) times a day.    finasteride (PROSCAR) 5 mg tablet Take 5 mg by mouth once daily.    losartan (COZAAR) 100 MG tablet Take 100 mg by mouth once daily.    fluticasone propionate (FLONASE) 50 mcg/actuation nasal spray 1 spray by Each Nostril route once daily.    latanoprost 0.005 % ophthalmic solution Place into both eyes every evening.    megestroL (MEGACE) 400 mg/10 mL (40 mg/mL) Susp Take 800 mg by mouth Daily.    multivitamin (THERAGRAN) per tablet Take 1 tablet by mouth once daily.    prednisoLONE acetate (PRED FORTE) 1 % DrpS Place 1 drop into both eyes every evening.    risperiDONE (RISPERDAL) 1 MG tablet Take 1 mg by mouth once daily.    risperiDONE (RISPERDAL) 2 MG tablet Take 2 mg by mouth nightly.    tamsulosin (FLOMAX) 0.4 mg Cap Take 0.4 mg by mouth every evening.    timolol maleate 0.5% (TIMOPTIC) 0.5 % Drop Place 1 drop into both eyes 2 (two) times daily.    zinc sulfate (ZINCATE) 50 mg zinc (220 mg) capsule Take 220 mg by mouth once daily.     Family History    None       Tobacco Use    Smoking status: Former    Smokeless tobacco: Never   Substance and Sexual Activity    Alcohol use: Not Currently    Drug use: Not Currently    Sexual activity: Not on file     Review of Systems   Unable to perform ROS: Mental status change   Objective:     Vital Signs (Most Recent):  Temp: (P) 98.6 °F (37  °C) (09/01/22 0200)  Pulse: 95 (09/01/22 0230)  Resp: (P) 16 (09/01/22 0200)  BP: 135/76 (09/01/22 0230)  SpO2: (P) 100 % (09/01/22 0200) Vital Signs (24h Range):  Temp:  [98.6 °F (37 °C)-98.8 °F (37.1 °C)] (P) 98.6 °F (37 °C)  Pulse:  [] 95  Resp:  [16] (P) 16  SpO2:  [100 %] (P) 100 %  BP: (110-135)/(70-76) 135/76     Weight: 79.4 kg (175 lb)  Body mass index is 24.41 kg/m².    Physical Exam  Constitutional:       General: He is not in acute distress.     Comments: Somnolent   HENT:      Head: Normocephalic.      Right Ear: External ear normal.      Left Ear: External ear normal.      Nose: Nose normal.      Mouth/Throat:      Mouth: Mucous membranes are dry.   Eyes:      Comments: Patient is blind   Cardiovascular:      Rate and Rhythm: Normal rate.      Pulses: Normal pulses.   Pulmonary:      Effort: Pulmonary effort is normal.   Abdominal:      Palpations: Abdomen is soft.   Musculoskeletal:      Cervical back: Normal range of motion.   Skin:     Capillary Refill: Capillary refill takes less than 2 seconds.   Neurological:      Comments: Aox2 person and place.  Able to respond to questions and commands           Significant Labs: All pertinent labs within the past 24 hours have been reviewed.  ABGs:   Recent Labs   Lab 08/31/22 2200   PH 7.411   PCO2 36.1   HCO3 22.9*   POCSATURATED 100   BE -2   PO2 387*     CBC:   Recent Labs   Lab 08/31/22 2252   WBC 5.51   HGB 11.4*   HCT 35.2*        CMP:   Recent Labs   Lab 08/31/22 2252      K 4.1      CO2 19*   *   BUN 30*   CREATININE 0.8   CALCIUM 8.9   PROT 6.6   ALBUMIN 2.9*   BILITOT 0.9   ALKPHOS 101   AST 28   ALT 28   ANIONGAP 14     Cardiac Markers:   Recent Labs   Lab 08/31/22 2252   BNP 10     Troponin:   Recent Labs   Lab 08/31/22 2252   TROPONINI 0.007       Significant Imaging: I have reviewed all pertinent imaging results/findings within the past 24 hours.  CT: I have reviewed all pertinent results/findings within the  past 24 hours and my personal findings are:  NO acute intracerebral pathology  CTA: Signs of 2 acute subsegmental pulmonary emboli  in the left lung    Assessment/Plan:     * Multiple subsegmental pulmonary emboli without acute cor pulmonale  Patient presented in acute hypoxic respiratory distress   CTA showed present of Pulmonary Emboli  Plan  Start heparin drip  Monitor Oxygen status        Disorientation  Patient diminished functional status from baseline  Patient unable to safely swallow  Responds to questions and commands  CT head showed no new pathology  Plan  Discontinue all oral medication until cleared by speech  Consult Speech for swallow study  Monitor Neuro status  Delirium precautions  Consider Neurology consult if symptoms due no improve      Diabetes mellitus due to underlying condition with hyperosmolarity without coma, without long-term current use of insulin  Patient diabetes not currently medicated  Plan  POCT Glucose TID  Insulin Sliding scale      Essential hypertension  Chronic problem  Patient hypotensive on initial presentation  Plan  Restart home losartan 100mg if HTN returns  Monitor vitals        VTE Risk Mitigation (From admission, onward)         Ordered     heparin 25,000 units in dextrose 5% (100 units/ml) IV bolus from bag - ADDITIONAL PRN BOLUS - 60 units/kg  As needed (PRN)        Question:  Heparin Infusion Adjustment (DO NOT MODIFY ANSWER)  Answer:  \\[email or web address]\epic\Images\Pharmacy\HeparinInfusions\heparin HIGH INTENSITY nomogram for [facility] [id number].pdf    09/01/22 0449     heparin 25,000 units in dextrose 5% (100 units/ml) IV bolus from bag - ADDITIONAL PRN BOLUS - 30 units/kg  As needed (PRN)        Question:  Heparin Infusion Adjustment (DO NOT MODIFY ANSWER)  Answer:  \\[email or web address]\epic\Images\Pharmacy\HeparinInfusions\heparin HIGH INTENSITY nomogram for [facility] [id number].pdf    09/01/22 0449     heparin 25,000 units in dextrose 5% (100 units/ml) IV bolus from bag INITIAL BOLUS  Once         Question:  Heparin Infusion Adjustment (DO NOT MODIFY ANSWER)  Answer:  [email or web address]\epic\Images\Pharmacy\HeparinInfusions\heparin HIGH INTENSITY nomogram for [facility] [id number].pdf    09/01/22 0449     heparin 25,000 units in dextrose 5% 250 mL (100 units/mL) infusion HIGH INTENSITY nomogram - [facility]  Continuous        Question Answer Comment   Heparin Infusion Adjustment (DO NOT MODIFY ANSWER) [email or web address]\epic\Images\Pharmacy\HeparinInfusions\heparin HIGH INTENSITY nomogram for [facility] [id number].pdf    Begin at (in units/kg/hr) 18        09/01/22 0449     IP VTE HIGH RISK PATIENT  Once         09/01/22 0258     Place sequential compression device  Until discontinued         09/01/22 0258                   Brennan Aceves MD  [facility] PGY-2  09/01/2022

## 2022-09-01 NOTE — PT/OT/SLP EVAL
Speech Language Pathology Evaluation  Bedside Swallow    Patient Name:  Adalberto Medina   MRN:  [id number]  Admitting Diagnosis: Multiple subsegmental pulmonary emboli without acute cor pulmonale    Recommendations:                 General Recommendations:   ongoing swallow eval to determine safest diet level  Diet recommendations: Thin (clear liquids at this time)   Aspiration Precautions:  needs assist with feeding, crush meds, must be alert and awake, single sips of liquid     General Precautions: Standard, fall, blind  Communication strategies:   pt is blind, minimal interactions    History per MD      Principal Problem:Multiple subsegmental pulmonary emboli without acute cor pulmonale     Chief Complaint:        Chief Complaint   Patient presents with    Weakness       Pt arrives via EMS from [facility], per [facility] staff pt has been more lethargic and not eating x1 week, Also reports pt is more confused than normal, staff reports pt baseline is Aox4. Pt is hypotensive upon arrival.         HPI: 82-year-old male with a past medical history of legal blindness, type 2 diabetes, essential hypertension, sacral wounds presents to the ED for altered mental status and shortness of breath. Patient was brought to the ED from [facility] due to change in mentation and activity.  Nursing home staff reports patient has not been eating much for the past week, and has become increasingly withdrawn and unresponsive. Patient's baseline is reported as Aox4 and fully conversant. Patient has been seen recently for a similar presentation and was treated for an UTI. Patient was hypoxic and minimally responsive on arrival to ED.      In ED patient was started on non-re breather mask O2 and given IV fluid bolus. Lab results did not indicate any acute infection, and CT head showed no signs of acute infarct or hemorrhage. Patients mental stats improved during his stay in the ED. A CTA chest was ordered and showed the presence of 2  pulmonary emboli. Patient is now satting 100% on room and responds to questions. Patient to be admitted to [facility]  for management of resolving PE and AMS.       Past Medical History:   Diagnosis Date    Essential hypertension     Legal blindness     Preglaucoma     Type 2 diabetes mellitus        No past surgical history on file.    Social History: Patient lives with at the [facility] home.  CT: Stable CT of the brain with no interval developing hemorrhage, mass or infarction.   Prior Intubation HX:  n/a    Modified Barium Swallow: none per EMR    Chest X-Rays: There appear to be mild atelectatic changes superimposed at the right lung base.  There is no dense consolidation, significant pleural effusion or pneumothorax.  The visualized osseous structures demonstrate chronic change.     Prior diet: limited intake at facility .    Subjective     Pt seen at bedside in room, no family present. Pt with eyes closed and limited command following. Pt with minimal verbalizations.   Patient goals: none stated     Pain/Comfort:  Pain Rating 1:  (did not report)    Respiratory Status: Room air    Objective:   Pt seen in room and needed coaxing to participate in swallow eval. No family available.     Oral Musculature Evaluation  Oral Musculature: general weakness  Dentition: edentulous  Secretion Management: oral holding  Mandibular Strength and Mobility:  (fair)  Oral Labial Strength and Mobility:  (fair)  Volitional Cough: elicited  Volitional Swallow: delayed swallow  Voice Prior to PO Intake: low volume    Bedside Swallow Eval:   Consistencies Assessed:  Thin liquids ice chips, water by tsp, water by straw  Puree pudding by tsp     Oral Phase:   Decreased closure around utensil  Excess chewing  Slow oral transit time  Slow bolus transport  Bolus holding noted with pureed    Pharyngeal Phase:   delayed swallow initation  no overt clinical signs/symptoms of pharyngeal dysphagia  multiple spontaneous swallows    Compensatory  Strategies  Assist with feeding   Encourage PO  Crush oral meds    Treatment: Notified MD of diet recs and impression, pt with no indication of learning new info.     Assessment:     Adalberto Medina is a 82 y.o. male admitted with Multiple subsegmental pulmonary emboli without acute cor pulmonale who presents with an SLP diagnosis of Dysphagia, at risk for malnutrition and dehydration due to poor intake. Palliative Consult is recommended to delineate goals of care.     Goals:   Multidisciplinary Problems       SLP Goals          Problem: SLP    Goal Priority Disciplines Outcome   SLP Goal     SLP Ongoing, Progressing   Description: Short Term Goals:  1. Pt will participate in swallow eval to determine safest diet level  2. Pt will tolerate clear liquid diet with no audible dysphagia.                        Plan:     Patient to be seen:  3 x/week   Plan of Care expires:  09/30/22  Plan of Care reviewed with:  patient   SLP Follow-Up:  Yes       Discharge recommendations:  nursing facility, basic   Barriers to Discharge:  None    Time Tracking:     SLP Treatment Date:   09/01/22  Speech Start Time:  1050  Speech Stop Time:  1106     Speech Total Time (min):  16 min    Billable Minutes: Eval Swallow and Oral Function 16    09/01/2022

## 2022-09-01 NOTE — NURSING
Dr. Burroughs notified of patient unable to obtain 2nd IV site despite multiple sticks. MD stated they would order anesthesia.

## 2022-09-01 NOTE — PLAN OF CARE
SINDI placed call to pt daughter Hallie [phone]. SINDI received no answer and could not leave  due to mailbox not set up.    SINDI called second number on file Ms. Dunn [phone] who is a relative and confirmed pt information. Family is now aware pt was sent to ED last night. Ms. Dunn asked if any staff contacted pt daughter, SINDI informed Ms. Dunn that this SINDI just tried and received no answer, however, if Ms. Dunn can call then SINDI will gladly give updates. Ms. Dunn thanked SINDI and will inform Ms. Sterling(pt daughter).       SINDI to attempt to call Ms. Sterling again to complete telephone assessment.     LIZA Reynolds  [facility]  [phone]

## 2022-09-01 NOTE — PLAN OF CARE
This SW sent facesheet, H&P, and notes to [facility] to provide update and confirm receipt of pt care. Pt not expected to d/c today. SW will continue to notify staff of expected DC date. At time of d/c SW will add transfer orders and staff will review for approval of pt back to facility.        09/01/22 1345   Post-Acute Status   Post-Acute Authorization Placement   Post-Acute Placement Status Referrals Sent   Discharge Delays None known at this time   Discharge Plan   Discharge Plan A Return to nursing home     LIZA Reynolds Case Management  [phone]

## 2022-09-01 NOTE — ASSESSMENT & PLAN NOTE
Patient presented in acute hypoxic respiratory distress   CTA showed present of Pulmonary Emboli  Plan  Start heparin drip  Monitor Oxygen status

## 2022-09-01 NOTE — ASSESSMENT & PLAN NOTE
- admitted with AMS and lethargy  - CTA with small vessel PE; echo pending; troponin and BNP normal  - BLE venous ultrasound pending  - on IV Heparin; recommend continuation of IV Heparin and transiton to oral AC when okay with primary team  - given normal BNP and troponin and small PE with no mention of RV strain recommendation for AC treatment and no thrombectomy indicated

## 2022-09-01 NOTE — SUBJECTIVE & OBJECTIVE
Past Medical History:   Diagnosis Date    Essential hypertension     Legal blindness     Preglaucoma     Type 2 diabetes mellitus        No past surgical history on file.    Review of patient's allergies indicates:  No Known Allergies    No current facility-administered medications on file prior to encounter.     Current Outpatient Medications on File Prior to Encounter   Medication Sig    acetaminophen (TYLENOL) 500 MG tablet Take 500 mg by mouth 2 (two) times a day.    ALPHAGAN P 0.1 % Drop Place 1 drop into both eyes 2 (two) times a day.    atorvastatin (LIPITOR) 20 MG tablet Take 20 mg by mouth every evening.    dorzolamide (TRUSOPT) 2 % ophthalmic solution Place 1 drop into both eyes 2 (two) times a day.    finasteride (PROSCAR) 5 mg tablet Take 5 mg by mouth once daily.    losartan (COZAAR) 100 MG tablet Take 100 mg by mouth once daily.    fluticasone propionate (FLONASE) 50 mcg/actuation nasal spray 1 spray by Each Nostril route once daily.    latanoprost 0.005 % ophthalmic solution Place into both eyes every evening.    megestroL (MEGACE) 400 mg/10 mL (40 mg/mL) Susp Take 800 mg by mouth Daily.    multivitamin (THERAGRAN) per tablet Take 1 tablet by mouth once daily.    prednisoLONE acetate (PRED FORTE) 1 % DrpS Place 1 drop into both eyes every evening.    risperiDONE (RISPERDAL) 1 MG tablet Take 1 mg by mouth once daily.    risperiDONE (RISPERDAL) 2 MG tablet Take 2 mg by mouth nightly.    tamsulosin (FLOMAX) 0.4 mg Cap Take 0.4 mg by mouth every evening.    timolol maleate 0.5% (TIMOPTIC) 0.5 % Drop Place 1 drop into both eyes 2 (two) times daily.    zinc sulfate (ZINCATE) 50 mg zinc (220 mg) capsule Take 220 mg by mouth once daily.     Family History    None       Tobacco Use    Smoking status: Former    Smokeless tobacco: Never   Substance and Sexual Activity    Alcohol use: Not Currently    Drug use: Not Currently    Sexual activity: Not on file     Review of Systems   Unable to perform ROS: Other    Objective:     Vital Signs (Most Recent):  Temp: 96.4 °F (35.8 °C) (09/01/22 0748)  Pulse: 104 (09/01/22 0748)  Resp: 16 (09/01/22 0748)  BP: (!) 106/57 (09/01/22 0748)  SpO2: 100 % (09/01/22 0748)   Vital Signs (24h Range):  Temp:  [96.4 °F (35.8 °C)-98.8 °F (37.1 °C)] 96.4 °F (35.8 °C)  Pulse:  [] 104  Resp:  [16] 16  SpO2:  [100 %] 100 %  BP: (101-140)/(54-86) 106/57     Weight: 59.9 kg (132 lb)  Body mass index is 18.41 kg/m².    SpO2: 100 %  O2 Device (Oxygen Therapy): room air    No intake or output data in the 24 hours ending 09/01/22 1105    Lines/Drains/Airways       Peripheral Intravenous Line  Duration                  Peripheral IV - Single Lumen 09/01/22 0313 18 G Right Antecubital <1 day                    Physical Exam  Constitutional:       Appearance: He is ill-appearing and toxic-appearing.   Cardiovascular:      Rate and Rhythm: Normal rate.   Pulmonary:      Effort: Pulmonary effort is normal.   Abdominal:      General: Abdomen is flat.       Significant Labs: BMP:   Recent Labs   Lab 08/31/22 2252 09/01/22  0529   * 129*    141   K 4.1 4.0    106   CO2 19* 21*   BUN 30* 28*   CREATININE 0.8 0.8   CALCIUM 8.9 9.1   MG 1.7 1.8   , CBC   Recent Labs   Lab 08/31/22 2252 09/01/22  0529   WBC 5.51 5.22   HGB 11.4* 12.3*   HCT 35.2* 37.6*    251   , and Troponin   Recent Labs   Lab 08/31/22 2252   TROPONINI 0.007       Significant Imaging: Echocardiogram: Transthoracic echo (TTE) complete (Cupid Only): final results pending   Results for orders placed or performed during the hospital encounter of 08/31/22   Echo   Result Value Ref Range    BSA 1.73 m2    LA WIDTH 3.51 cm    TDI LATERAL 0.08 m/s    PV PEAK VELOCITY 1.20 cm/s    LVIDd 3.82 3.5 - 6.0 cm    IVS 1.02 0.6 - 1.1 cm    Posterior Wall 0.95 0.6 - 1.1 cm    Ao root annulus 2.94 cm    LVIDs 3.03 2.1 - 4.0 cm    FS 21 28 - 44 %    LA volume 17.40 cm3    LV mass 115.72 g    LA size 2.43 cm    TAPSE 1.17 cm     Left Ventricle Relative Wall Thickness 0.50 cm    AV mean gradient 5 mmHg    AV valve area 2.25 cm2    AV Velocity Ratio 0.73     AV index (prosthetic) 0.72     MV mean gradient 1 mmHg    MV valve area by continuity eq 3.63 cm2    LVOT diameter 2.00 cm    LVOT area 3.1 cm2    LVOT peak bessy 1.11 m/s    LVOT peak VTI 18.72 cm    Ao peak bessy 1.53 m/s    Ao VTI 26.16 cm    LVOT stroke volume 58.78 cm3    AV peak gradient 9 mmHg    MV peak gradient 4 mmHg    MV VTI 16.19 cm    LV Systolic Volume 35.79 mL    LV Systolic Volume Index 20.2 mL/m2    LV Diastolic Volume 62.63 mL    LV Diastolic Volume Index 35.38 mL/m2    LA Volume Index 9.8 mL/m2    LV Mass Index 65 g/m2    RA Major Axis 2.42 cm    Left Atrium Minor Axis 2.56 cm    Left Atrium Major Axis 2.26 cm    LA Volume Index (Mod) 9.7 mL/m2    LA volume (mod) 17.09 cm3    RA Width 2.66 cm

## 2022-09-01 NOTE — NURSING
Pt very slow to swallow and having difficulty following swallowing guidance,dr mercedes notified of this and of inability to safely give pt oral meds. He was notified of need for speech and wound care consults. Wounds present on admission.

## 2022-09-01 NOTE — CONSULTS
[facility] - Med Surg  Cardiology  Consult Note    Patient Name: Adalberto Medina  MRN: [id number]  Admission Date: 8/31/2022  Hospital Length of Stay: 0 days  Code Status: Full Code   Attending Provider: López Mota III, MD   Consulting Provider: ALEXEI Kang ANP  Primary Care Physician: Primary Doctor No  Principal Problem:Multiple subsegmental pulmonary emboli without acute cor pulmonale    Patient information was obtained from past medical records and ER records.     Inpatient consult to [facility]  Consult performed by: ALEXEI Davila ANP  Consult ordered by: Ferdinand Vyas MD  Reason for consult: PE        Subjective:     Chief Complaint:  AMS and lethargy      HPI:   81yo male with HTN, DMII, HLP and PE who presented to the ER from [facility] with lethargy/AMS and decreased appetite. He was seen in the echo lab while undergoing echo. He was resting with his eyes closed with lethargy noted. His HPI was obtained from his admit H&P. He was noted to be more lethargic and withdrawn with SOB by the nursing home staff therefore he was sent to the ER for evaluation. In the ER, BNP 10 troponin .007 Blood culture pending CTA with small vessel acute PE. BLE venous ultrasound and echo pending. Admitted to [facility] and Cardiology consulted for PE evaluation     Hospital Course: 9/1/2022 per HPI      Past Medical History:   Diagnosis Date    Essential hypertension     Legal blindness     Preglaucoma     Type 2 diabetes mellitus        No past surgical history on file.    Review of patient's allergies indicates:  No Known Allergies    No current facility-administered medications on file prior to encounter.     Current Outpatient Medications on File Prior to Encounter   Medication Sig    acetaminophen (TYLENOL) 500 MG tablet Take 500 mg by mouth 2 (two) times a day.    ALPHAGAN P 0.1 % Drop Place 1 drop into both eyes 2 (two) times a day.    atorvastatin (LIPITOR) 20 MG tablet  Take 20 mg by mouth every evening.    dorzolamide (TRUSOPT) 2 % ophthalmic solution Place 1 drop into both eyes 2 (two) times a day.    finasteride (PROSCAR) 5 mg tablet Take 5 mg by mouth once daily.    losartan (COZAAR) 100 MG tablet Take 100 mg by mouth once daily.    fluticasone propionate (FLONASE) 50 mcg/actuation nasal spray 1 spray by Each Nostril route once daily.    latanoprost 0.005 % ophthalmic solution Place into both eyes every evening.    megestroL (MEGACE) 400 mg/10 mL (40 mg/mL) Susp Take 800 mg by mouth Daily.    multivitamin (THERAGRAN) per tablet Take 1 tablet by mouth once daily.    prednisoLONE acetate (PRED FORTE) 1 % DrpS Place 1 drop into both eyes every evening.    risperiDONE (RISPERDAL) 1 MG tablet Take 1 mg by mouth once daily.    risperiDONE (RISPERDAL) 2 MG tablet Take 2 mg by mouth nightly.    tamsulosin (FLOMAX) 0.4 mg Cap Take 0.4 mg by mouth every evening.    timolol maleate 0.5% (TIMOPTIC) 0.5 % Drop Place 1 drop into both eyes 2 (two) times daily.    zinc sulfate (ZINCATE) 50 mg zinc (220 mg) capsule Take 220 mg by mouth once daily.     Family History    None       Tobacco Use    Smoking status: Former    Smokeless tobacco: Never   Substance and Sexual Activity    Alcohol use: Not Currently    Drug use: Not Currently    Sexual activity: Not on file     Review of Systems   Unable to perform ROS: Other   Objective:     Vital Signs (Most Recent):  Temp: 96.4 °F (35.8 °C) (09/01/22 0748)  Pulse: 104 (09/01/22 0748)  Resp: 16 (09/01/22 0748)  BP: (!) 106/57 (09/01/22 0748)  SpO2: 100 % (09/01/22 0748)   Vital Signs (24h Range):  Temp:  [96.4 °F (35.8 °C)-98.8 °F (37.1 °C)] 96.4 °F (35.8 °C)  Pulse:  [] 104  Resp:  [16] 16  SpO2:  [100 %] 100 %  BP: (101-140)/(54-86) 106/57     Weight: 59.9 kg (132 lb)  Body mass index is 18.41 kg/m².    SpO2: 100 %  O2 Device (Oxygen Therapy): room air    No intake or output data in the 24 hours ending 09/01/22  1105    Lines/Drains/Airways       Peripheral Intravenous Line  Duration                  Peripheral IV - Single Lumen 09/01/22 0313 18 G Right Antecubital <1 day                    Physical Exam  Constitutional:       Appearance: He is ill-appearing and toxic-appearing.   Cardiovascular:      Rate and Rhythm: Normal rate.   Pulmonary:      Effort: Pulmonary effort is normal.   Abdominal:      General: Abdomen is flat.       Significant Labs: BMP:   Recent Labs   Lab 08/31/22 2252 09/01/22  0529   * 129*    141   K 4.1 4.0    106   CO2 19* 21*   BUN 30* 28*   CREATININE 0.8 0.8   CALCIUM 8.9 9.1   MG 1.7 1.8   , CBC   Recent Labs   Lab 08/31/22 2252 09/01/22  0529   WBC 5.51 5.22   HGB 11.4* 12.3*   HCT 35.2* 37.6*    251   , and Troponin   Recent Labs   Lab 08/31/22 2252   TROPONINI 0.007       Significant Imaging: Echocardiogram: Transthoracic echo (TTE) complete (Cupid Only): final results pending   Results for orders placed or performed during the hospital encounter of 08/31/22   Echo   Result Value Ref Range    BSA 1.73 m2    LA WIDTH 3.51 cm    TDI LATERAL 0.08 m/s    PV PEAK VELOCITY 1.20 cm/s    LVIDd 3.82 3.5 - 6.0 cm    IVS 1.02 0.6 - 1.1 cm    Posterior Wall 0.95 0.6 - 1.1 cm    Ao root annulus 2.94 cm    LVIDs 3.03 2.1 - 4.0 cm    FS 21 28 - 44 %    LA volume 17.40 cm3    LV mass 115.72 g    LA size 2.43 cm    TAPSE 1.17 cm    Left Ventricle Relative Wall Thickness 0.50 cm    AV mean gradient 5 mmHg    AV valve area 2.25 cm2    AV Velocity Ratio 0.73     AV index (prosthetic) 0.72     MV mean gradient 1 mmHg    MV valve area by continuity eq 3.63 cm2    LVOT diameter 2.00 cm    LVOT area 3.1 cm2    LVOT peak bessy 1.11 m/s    LVOT peak VTI 18.72 cm    Ao peak bessy 1.53 m/s    Ao VTI 26.16 cm    LVOT stroke volume 58.78 cm3    AV peak gradient 9 mmHg    MV peak gradient 4 mmHg    MV VTI 16.19 cm    LV Systolic Volume 35.79 mL    LV Systolic Volume Index 20.2 mL/m2    LV Diastolic  Volume 62.63 mL    LV Diastolic Volume Index 35.38 mL/m2    LA Volume Index 9.8 mL/m2    LV Mass Index 65 g/m2    RA Major Axis 2.42 cm    Left Atrium Minor Axis 2.56 cm    Left Atrium Major Axis 2.26 cm    LA Volume Index (Mod) 9.7 mL/m2    LA volume (mod) 17.09 cm3    RA Width 2.66 cm     Assessment and Plan:     * Multiple subsegmental pulmonary emboli without acute cor pulmonale  - admitted with AMS and lethargy  - CTA with small vessel PE; echo pending; troponin and BNP normal  - BLE venous ultrasound pending  - on IV Heparin; recommend continuation of IV Heparin and transiton to oral AC when okay with primary team  - given normal BNP and troponin and small PE with no mention of RV strain recommendation for AC treatment and no thrombectomy indicated     Diabetes mellitus due to underlying condition with hyperosmolarity without coma, without long-term current use of insulin  - managed by primary team     Essential hypertension  - SBP 90s-130s  - on Losartan at home  - monitor BP and resume ARB as BP tolerates         VTE Risk Mitigation (From admission, onward)         Ordered     heparin 25,000 units in dextrose 5% (100 units/ml) IV bolus from bag - ADDITIONAL PRN BOLUS - 60 units/kg  As needed (PRN)        Question:  Heparin Infusion Adjustment (DO NOT MODIFY ANSWER)  Answer:  \\[email or web address]\Images\Pharmacy\HeparinInfusions\heparin HIGH INTENSITY nomogram for [facility] [id number]    09/01/22 0449     heparin 25,000 units in dextrose 5% (100 units/ml) IV bolus from bag - ADDITIONAL PRN BOLUS - 30 units/kg  As needed (PRN)        Question:  Heparin Infusion Adjustment (DO NOT MODIFY ANSWER)  Answer:  \\[email or web address]\Images\Pharmacy\HeparinInfusions\heparin HIGH INTENSITY nomogram for [facility] [id number]    09/01/22 0449     heparin 25,000 units in dextrose 5% 250 mL (100 units/mL) infusion HIGH INTENSITY nomogram - [facility]  Continuous        Question Answer Comment   Heparin Infusion Adjustment (DO NOT MODIFY ANSWER)  [email or web address]\Images\Pharmacy\HeparinInfusions\heparin HIGH INTENSITY nomogram for [facility] [id number]    Begin at (in units/kg/hr) 18        09/01/22 0449     IP VTE HIGH RISK PATIENT  Once         09/01/22 0258     Place sequential compression device  Until discontinued         09/01/22 0258                Thank you for your consult. I will follow-up with patient. Please contact us if you have any additional questions.    ALEXEI Kang, ANP  Cardiology   [facility]

## 2022-09-01 NOTE — ASSESSMENT & PLAN NOTE
Patient diminished functional status from baseline  Patient unable to safely swallow  Responds to questions and commands  CT head showed no new pathology  Plan  Discontinue all oral medication until cleared by speech  Consult Speech for swallow study  Monitor Neuro status  Delirium precautions  Consider Neurology consult if symptoms due no improve

## 2022-09-01 NOTE — HPI
83yo male with HTN, DMII, HLP and PE who presented to the ER from [facility] with lethargy/AMS and decreased appetite. He was seen in the echo lab while undergoing echo. He was resting with his eyes closed with lethargy noted. His HPI was obtained from his admit H&P. He was noted to be more lethargic and withdrawn with SOB by the nursing home staff therefore he was sent to the ER for evaluation. In the ER, BNP 10 troponin .007 Blood culture pending CTA with small vessel acute PE. BLE venous ultrasound and echo pending. Admitted to [facility] and Cardiology consulted for PE evaluation

## 2022-09-01 NOTE — PROGRESS NOTES
Pharmacokinetic Initial Assessment: IV Vancomycin    Assessment/Plan:    Initiate intravenous vancomycin with loading dose of 1500 mg once followed by a maintenance dose of vancomycin 1500mg IV every 24 hours  Desired empiric serum trough concentration is 10 to 15 mcg/mL  Draw vancomycin trough level 60 min prior to third dose on 9/3 at approximately 0900  Pharmacy will continue to follow and monitor vancomycin.      Please contact pharmacy at extension [phone] with any questions regarding this assessment.     Thank you for the consult,   Karen Mcgilly       Patient brief summary:  Adalberto Medina is a 82 y.o. male initiated on antimicrobial therapy with IV Vancomycin for treatment of suspected skin & soft tissue infection    Drug Allergies:   Review of patient's allergies indicates:  No Known Allergies    Actual Body Weight:   60.3 kg    Renal Function:   Estimated Creatinine Clearance: 60.7 mL/min (based on SCr of 0.8 mg/dL).,     Dialysis Method (if applicable):  N/A    CBC (last 72 hours):  Recent Labs   Lab Result Units 08/31/22  2252   WBC K/uL 5.51   Hemoglobin g/dL 11.4*   Hematocrit % 35.2*   Platelets K/uL 234   Gran % % 74.6*   Lymph % % 18.5   Mono % % 5.8   Eosinophil % % 0.5   Basophil % % 0.2   Differential Method  Automated       Metabolic Panel (last 72 hours):  Recent Labs   Lab Result Units 08/31/22  2200 08/31/22  2252   Sodium mmol/L  --  140   Potassium mmol/L  --  4.1   Chloride mmol/L  --  107   CO2 mmol/L  --  19*   Glucose mg/dL  --  152*   Glucose, UA  Negative  --    BUN mg/dL  --  30*   Creatinine mg/dL  --  0.8   Albumin g/dL  --  2.9*   Total Bilirubin mg/dL  --  0.9   Alkaline Phosphatase U/L  --  101   AST U/L  --  28   ALT U/L  --  28   Magnesium mg/dL  --  1.7       Drug levels (last 3 results):  No results for input(s): VANCOMYCINRA, VANCORANDOM, VANCOMYCINPE, VANCOPEAK, VANCOMYCINTR, VANCOTROUGH in the last 72 hours.    Microbiologic Results:  Microbiology Results (last 7 days)        Procedure Component Value Units Date/Time    Blood culture x two cultures. Draw prior to antibiotics. [[id number]] Collected: 08/31/22 2251    Order Status: Sent Specimen: Blood from Peripheral, Upper Arm, Right Updated: 09/01/22 0138    Blood culture x two cultures. Draw prior to antibiotics. [[id number]] Collected: 09/01/22 0028    Order Status: Sent Specimen: Blood from Peripheral, Wrist, Left Updated: 09/01/22 0029

## 2022-09-01 NOTE — ASSESSMENT & PLAN NOTE
Chronic problem  Patient hypotensive on initial presentation  Plan  Restart home losartan 100mg if HTN returns  Monitor vitals

## 2022-09-01 NOTE — HPI
82-year-old male with a past medical history of legal blindness, type 2 diabetes, essential hypertension, sacral wounds presents to the ED for altered mental status and shortness of breath. Patient was brought to the ED from [facility] due to change in mentation and activity.  Nursing home staff reports patient has not been eating much for the past week, and has become increasingly withdrawn and unresponsive. Patient's baseline is reported as Aox4 and fully conversant. Patient has been seen recently for a similar presentation and was treated for an UTI. Patient was hypoxic and minimally responsive on arrival to ED.     In ED patient was started on non-re breather mask O2 and given IV fluid bolus. Lab results did not indicate any acute infection, and CT head showed no signs of acute infarct or hemorrhage. Patients mental stats improved during his stay in the ED. A CTA chest was ordered and showed the presence of 2 pulmonary emboli. Patient is now satting 100% on room and responds to questions. Patient to be admitted to [facility]  for management of resolving PE and AMS.

## 2022-09-01 NOTE — CONSULTS
[facility] - [facility] Surg  Wound Care    Patient Name:  Adalberto Medina   MRN:  [id number]  Date: 9/1/2022  Diagnosis: Multiple subsegmental pulmonary emboli without acute cor pulmonale    History:     Past Medical History:   Diagnosis Date    Essential hypertension     Legal blindness     Preglaucoma     Type 2 diabetes mellitus        Social History     Socioeconomic History    Marital status:    Tobacco Use    Smoking status: Former    Smokeless tobacco: Never   Substance and Sexual Activity    Alcohol use: Not Currently    Drug use: Not Currently       Precautions:     Allergies as of 08/31/2022    (No Known Allergies)       [facility] Assessment Details/Treatment     R ear- full thickness wound- 0.5x0.3x0.1cm- present on admit        Coccyx/sacrum- Stage 4 pressure injury- present on admit- thin layer of tissue over coccyx bone 7x7x0.1cm        Scrotum- partial thickness wound- present on admit        R upper and lower hip- purple/maroon discoloration- present on admit- DTI        R heel- non-blanchable redness with small area of darker purple area- present on admit- 5x5cm        L heel- non-blanchable redness- Stage 1- present on admit- 5x5cm      Recommendations discussed with nurse and  Dr. SESAR Aceves-  - Nursing to maintain Pressure injury prevention interventions to include waffle overlay and heel boots  - Triad ointment to sacrum/coccyx BID prn incontinent episode  - Mepilex border to R ear, and bilateral heels    09/01/2022

## 2022-09-01 NOTE — PLAN OF CARE
09/01/22 0604   Admission   Initial VN Admission Questions Complete   Communication Issues? Patient Vision   Shift   Virtual Nurse - Rounding Complete   Pain Management Interventions quiet environment facilitated;relaxation techniques promoted   Virtual Nurse - Patient Verbalized Approval Of Camera Use;VN Rounding   Type of Frequent Check   Type Patient Rounds;Telemetry Monitoring   Safety/Activity   Patient Rounds bed in low position;bed wheels locked;call light in patient/parent reach;clutter free environment maintained;ID band on;visualized patient;placement of personal items at bedside   Safety Promotion/Fall Prevention side rails raised x 2;assistive device/personal item within reach;bed alarm set;Fall Risk signage in place;nonskid shoes/socks when out of bed;room near unit station;instructed to call staff for mobility   Activity Management Rolling - L1   Positioning   Body Position weight shifting   Head of Bed (HOB) Positioning HOB elevated   Positioning/Transfer Devices wedge;pillows;in use    VN to complete admit assessment with Nursing home Med rec  and Md H&P. Will continue to monitor and intervene as needed.

## 2022-09-01 NOTE — PLAN OF CARE
SINDI met with pt at bedside to complete assessment. Pt is sleeping on room air. SW called pt name but pt did not answer. SINDI added name to Whiteboard.  SINDI spoke with Hallie pt daughter and was able to provide her with nurses station phone number. Pt is from [facility] and SW sent information via [employer]. At time of discharge pt will need transportation assistance.  SINDI updated whiteboard with SINDICM name and contact information. SINDI confirmed pt understanding of Observation unit and expected discharge plan. SINDI will continue to follow pt throughout care and assist with any discharge needs.       09/01/22 1517   Discharge Planning   Assessment Type Discharge Planning Brief Assessment   Resource/Environmental Concerns none   Support Systems Children;Family members   Equipment Currently Used at Home none   Current Living Arrangements home/apartment/condo   Patient/Family Anticipates Transition to long-term care facility   Patient/Family Anticipated Services at Transition none   DME Needed Upon Discharge  none   Discharge Plan A Return to nursing home     No future appointments.  Pt to see facility physician.    LIZA [facility] Case Management  [phone]

## 2022-09-02 LAB
ALBUMIN SERPL BCP-MCNC: 2.7 G/DL (ref 3.5–5.2)
ALP SERPL-CCNC: 89 U/L (ref 55–135)
ALT SERPL W/O P-5'-P-CCNC: 25 U/L (ref 10–44)
ANION GAP SERPL CALC-SCNC: 11 MMOL/L (ref 8–16)
APTT BLDCRRT: 131.8 SEC (ref 21–32)
APTT BLDCRRT: 29.7 SEC (ref 21–32)
APTT BLDCRRT: 73 SEC (ref 21–32)
AST SERPL-CCNC: 22 U/L (ref 10–40)
BASOPHILS # BLD AUTO: 0.01 K/UL (ref 0–0.2)
BASOPHILS NFR BLD: 0.2 % (ref 0–1.9)
BILIRUB SERPL-MCNC: 1 MG/DL (ref 0.1–1)
BUN SERPL-MCNC: 16 MG/DL (ref 8–23)
CALCIUM SERPL-MCNC: 8.6 MG/DL (ref 8.7–10.5)
CHLORIDE SERPL-SCNC: 106 MMOL/L (ref 95–110)
CO2 SERPL-SCNC: 22 MMOL/L (ref 23–29)
CREAT SERPL-MCNC: 0.6 MG/DL (ref 0.5–1.4)
DIFFERENTIAL METHOD: ABNORMAL
EOSINOPHIL # BLD AUTO: 0 K/UL (ref 0–0.5)
EOSINOPHIL NFR BLD: 0.9 % (ref 0–8)
ERYTHROCYTE [DISTWIDTH] IN BLOOD BY AUTOMATED COUNT: 13.8 % (ref 11.5–14.5)
EST. GFR  (NO RACE VARIABLE): >60 ML/MIN/1.73 M^2
FOLATE SERPL-MCNC: 13.2 NG/ML (ref 4–24)
GLUCOSE SERPL-MCNC: 86 MG/DL (ref 70–110)
HCT VFR BLD AUTO: 35.4 % (ref 40–54)
HGB BLD-MCNC: 11.6 G/DL (ref 14–18)
HIV 1+2 AB+HIV1 P24 AG SERPL QL IA: NORMAL
IMM GRANULOCYTES # BLD AUTO: 0.02 K/UL (ref 0–0.04)
IMM GRANULOCYTES NFR BLD AUTO: 0.4 % (ref 0–0.5)
LYMPHOCYTES # BLD AUTO: 1.3 K/UL (ref 1–4.8)
LYMPHOCYTES NFR BLD: 28.6 % (ref 18–48)
MAGNESIUM SERPL-MCNC: 1.5 MG/DL (ref 1.6–2.6)
MCH RBC QN AUTO: 27.2 PG (ref 27–31)
MCHC RBC AUTO-ENTMCNC: 32.8 G/DL (ref 32–36)
MCV RBC AUTO: 83 FL (ref 82–98)
MONOCYTES # BLD AUTO: 0.3 K/UL (ref 0.3–1)
MONOCYTES NFR BLD: 6.2 % (ref 4–15)
MRSA ID BY PCR: NEGATIVE
NEUTROPHILS # BLD AUTO: 2.9 K/UL (ref 1.8–7.7)
NEUTROPHILS NFR BLD: 63.7 % (ref 38–73)
NRBC BLD-RTO: 0 /100 WBC
PHOSPHATE SERPL-MCNC: 2.3 MG/DL (ref 2.7–4.5)
PLATELET # BLD AUTO: 255 K/UL (ref 150–450)
PMV BLD AUTO: 9.1 FL (ref 9.2–12.9)
POCT GLUCOSE: 104 MG/DL (ref 70–110)
POCT GLUCOSE: 107 MG/DL (ref 70–110)
POCT GLUCOSE: 81 MG/DL (ref 70–110)
POCT GLUCOSE: 92 MG/DL (ref 70–110)
POTASSIUM SERPL-SCNC: 3.9 MMOL/L (ref 3.5–5.1)
PROT SERPL-MCNC: 6.1 G/DL (ref 6–8.4)
RBC # BLD AUTO: 4.27 M/UL (ref 4.6–6.2)
SODIUM SERPL-SCNC: 139 MMOL/L (ref 136–145)
STAPH AUREUS ID BY PCR: NEGATIVE
TSH SERPL DL<=0.005 MIU/L-ACNC: 1.07 UIU/ML (ref 0.4–4)
VANCOMYCIN SERPL-MCNC: 6.9 UG/ML
WBC # BLD AUTO: 4.54 K/UL (ref 3.9–12.7)

## 2022-09-02 PROCEDURE — 84443 ASSAY THYROID STIM HORMONE: CPT | Performed by: STUDENT IN AN ORGANIZED HEALTH CARE EDUCATION/TRAINING PROGRAM

## 2022-09-02 PROCEDURE — A4216 STERILE WATER/SALINE, 10 ML: HCPCS | Performed by: FAMILY MEDICINE

## 2022-09-02 PROCEDURE — 85730 THROMBOPLASTIN TIME PARTIAL: CPT | Performed by: STUDENT IN AN ORGANIZED HEALTH CARE EDUCATION/TRAINING PROGRAM

## 2022-09-02 PROCEDURE — 82746 ASSAY OF FOLIC ACID SERUM: CPT | Performed by: STUDENT IN AN ORGANIZED HEALTH CARE EDUCATION/TRAINING PROGRAM

## 2022-09-02 PROCEDURE — 25000003 PHARM REV CODE 250: Performed by: STUDENT IN AN ORGANIZED HEALTH CARE EDUCATION/TRAINING PROGRAM

## 2022-09-02 PROCEDURE — 36415 COLL VENOUS BLD VENIPUNCTURE: CPT

## 2022-09-02 PROCEDURE — 94761 N-INVAS EAR/PLS OXIMETRY MLT: CPT

## 2022-09-02 PROCEDURE — 80053 COMPREHEN METABOLIC PANEL: CPT | Performed by: STUDENT IN AN ORGANIZED HEALTH CARE EDUCATION/TRAINING PROGRAM

## 2022-09-02 PROCEDURE — 87040 BLOOD CULTURE FOR BACTERIA: CPT

## 2022-09-02 PROCEDURE — 84100 ASSAY OF PHOSPHORUS: CPT | Performed by: STUDENT IN AN ORGANIZED HEALTH CARE EDUCATION/TRAINING PROGRAM

## 2022-09-02 PROCEDURE — 36415 COLL VENOUS BLD VENIPUNCTURE: CPT | Performed by: STUDENT IN AN ORGANIZED HEALTH CARE EDUCATION/TRAINING PROGRAM

## 2022-09-02 PROCEDURE — 92526 ORAL FUNCTION THERAPY: CPT

## 2022-09-02 PROCEDURE — 63600175 PHARM REV CODE 636 W HCPCS: Performed by: FAMILY MEDICINE

## 2022-09-02 PROCEDURE — 63600175 PHARM REV CODE 636 W HCPCS

## 2022-09-02 PROCEDURE — 25000003 PHARM REV CODE 250

## 2022-09-02 PROCEDURE — 25000003 PHARM REV CODE 250: Performed by: FAMILY MEDICINE

## 2022-09-02 PROCEDURE — 85730 THROMBOPLASTIN TIME PARTIAL: CPT | Mod: 91 | Performed by: FAMILY MEDICINE

## 2022-09-02 PROCEDURE — 21400001 HC TELEMETRY ROOM

## 2022-09-02 PROCEDURE — 80202 ASSAY OF VANCOMYCIN: CPT | Performed by: FAMILY MEDICINE

## 2022-09-02 PROCEDURE — 63600175 PHARM REV CODE 636 W HCPCS: Performed by: STUDENT IN AN ORGANIZED HEALTH CARE EDUCATION/TRAINING PROGRAM

## 2022-09-02 PROCEDURE — 85025 COMPLETE CBC W/AUTO DIFF WBC: CPT | Performed by: STUDENT IN AN ORGANIZED HEALTH CARE EDUCATION/TRAINING PROGRAM

## 2022-09-02 PROCEDURE — 36415 COLL VENOUS BLD VENIPUNCTURE: CPT | Performed by: FAMILY MEDICINE

## 2022-09-02 PROCEDURE — 87150 DNA/RNA AMPLIFIED PROBE: CPT | Performed by: EMERGENCY MEDICINE

## 2022-09-02 PROCEDURE — 25000242 PHARM REV CODE 250 ALT 637 W/ HCPCS: Performed by: STUDENT IN AN ORGANIZED HEALTH CARE EDUCATION/TRAINING PROGRAM

## 2022-09-02 PROCEDURE — 84425 ASSAY OF VITAMIN B-1: CPT | Performed by: STUDENT IN AN ORGANIZED HEALTH CARE EDUCATION/TRAINING PROGRAM

## 2022-09-02 PROCEDURE — 87389 HIV-1 AG W/HIV-1&-2 AB AG IA: CPT | Performed by: STUDENT IN AN ORGANIZED HEALTH CARE EDUCATION/TRAINING PROGRAM

## 2022-09-02 PROCEDURE — 83735 ASSAY OF MAGNESIUM: CPT | Performed by: STUDENT IN AN ORGANIZED HEALTH CARE EDUCATION/TRAINING PROGRAM

## 2022-09-02 RX ORDER — MAGNESIUM SULFATE 1 G/100ML
1 INJECTION INTRAVENOUS ONCE
Status: COMPLETED | OUTPATIENT
Start: 2022-09-02 | End: 2022-09-02

## 2022-09-02 RX ORDER — SODIUM,POTASSIUM PHOSPHATES 280-250MG
1 POWDER IN PACKET (EA) ORAL EVERY 6 HOURS
Status: DISCONTINUED | OUTPATIENT
Start: 2022-09-02 | End: 2022-09-02

## 2022-09-02 RX ORDER — SODIUM,POTASSIUM PHOSPHATES 280-250MG
1 POWDER IN PACKET (EA) ORAL EVERY 6 HOURS
Status: COMPLETED | OUTPATIENT
Start: 2022-09-02 | End: 2022-09-02

## 2022-09-02 RX ADMIN — APIXABAN 10 MG: 5 TABLET, FILM COATED ORAL at 10:09

## 2022-09-02 RX ADMIN — PIPERACILLIN AND TAZOBACTAM 4.5 G: 4; .5 INJECTION, POWDER, LYOPHILIZED, FOR SOLUTION INTRAVENOUS; PARENTERAL at 01:09

## 2022-09-02 RX ADMIN — TIMOLOL MALEATE 1 DROP: 5 SOLUTION/ DROPS OPHTHALMIC at 09:09

## 2022-09-02 RX ADMIN — POTASSIUM & SODIUM PHOSPHATES POWDER PACK 280-160-250 MG 1 PACKET: 280-160-250 PACK at 06:09

## 2022-09-02 RX ADMIN — SODIUM CHLORIDE, PRESERVATIVE FREE 10 ML: 5 INJECTION INTRAVENOUS at 01:09

## 2022-09-02 RX ADMIN — SODIUM CHLORIDE, PRESERVATIVE FREE 10 ML: 5 INJECTION INTRAVENOUS at 11:09

## 2022-09-02 RX ADMIN — FLUTICASONE PROPIONATE 50 MCG: 50 SPRAY, METERED NASAL at 09:09

## 2022-09-02 RX ADMIN — VANCOMYCIN HYDROCHLORIDE 1500 MG: 1.5 INJECTION, POWDER, LYOPHILIZED, FOR SOLUTION INTRAVENOUS at 10:09

## 2022-09-02 RX ADMIN — PREDNISOLONE ACETATE 1 DROP: 10 SUSPENSION/ DROPS OPHTHALMIC at 10:09

## 2022-09-02 RX ADMIN — TIMOLOL MALEATE 1 DROP: 5 SOLUTION/ DROPS OPHTHALMIC at 10:09

## 2022-09-02 RX ADMIN — HEPARIN SODIUM 10 UNITS/KG/HR: 10000 INJECTION, SOLUTION INTRAVENOUS at 01:09

## 2022-09-02 RX ADMIN — POTASSIUM & SODIUM PHOSPHATES POWDER PACK 280-160-250 MG 1 PACKET: 280-160-250 PACK at 11:09

## 2022-09-02 RX ADMIN — LATANOPROST 1 DROP: 50 SOLUTION OPHTHALMIC at 10:09

## 2022-09-02 RX ADMIN — SODIUM CHLORIDE, PRESERVATIVE FREE 10 ML: 5 INJECTION INTRAVENOUS at 06:09

## 2022-09-02 RX ADMIN — PIPERACILLIN AND TAZOBACTAM 4.5 G: 4; .5 INJECTION, POWDER, LYOPHILIZED, FOR SOLUTION INTRAVENOUS; PARENTERAL at 09:09

## 2022-09-02 RX ADMIN — HEPARIN SODIUM 8 UNITS/KG/HR: 10000 INJECTION, SOLUTION INTRAVENOUS at 06:09

## 2022-09-02 RX ADMIN — POTASSIUM & SODIUM PHOSPHATES POWDER PACK 280-160-250 MG 1 PACKET: 280-160-250 PACK at 01:09

## 2022-09-02 RX ADMIN — MAGNESIUM SULFATE 1 G: 1 INJECTION INTRAVENOUS at 09:09

## 2022-09-02 NOTE — ASSESSMENT & PLAN NOTE
Patient diminished functional status from baseline  Patient unable to safely swallow  Responds to questions and commands  CT head showed no new pathology  Speech recommend this liquids and assist with feeding.       Plan  Thin Liquids  Monitor Neuro status  Monitor Electrolytes  Delirium precautions  Consider Neurology consult if symptoms do not improve

## 2022-09-02 NOTE — PT/OT/SLP PROGRESS
Speech Language Pathology Treatment    Patient Name:  Adalberto Medina   MRN:  [id number]  Admitting Diagnosis: Multiple subsegmental pulmonary emboli without acute cor pulmonale    Recommendations:                 Diet recommendations: pureed diet and thin liquids, boost for added supplements  Aspiration Precautions:  needs assist with feeding, crush meds, must be alert and awake, single sips of liquid     General Precautions: Standard, fall, blind  Communication strategies:   pt is blind, delayed responses and inconsistent command following     Subjective     Pt seen in room, Rn reported better intake of liquids and crushed meds. No family present  Patient goals: none stated      Pain/Comfort:  Pain Rating 1: 0/10    Respiratory Status: Room air    Objective:     Has the patient been evaluated by SLP for swallowing?   Yes  Keep patient NPO? No   Current Respiratory Status:    room air     Swallowing: Pt seen at bedside for ongoing swallow eval. Pt found with eyes closed but is blind. Pt did move when SLP interacted with him. He was agreeable to PO trials. Pt consumed tsp and straw swallows of water with slow oral acceptance noted.   Pt given bites of pudding and SLP did cue him that something was being presented to him each time.   Pt with delayed pharyngeal swallow during all trials and 1 instance of bolus holding noted but needed verbal cues to initiate swallow. Pt may advance to pureed and thin liquids with boost as oral supplements in between meals. Pt will need 1:1 supervision and assist with meals.   Notified primary MD team of above.     Assessment:     Adalberto Medina is a 82 y.o. male admitted with Multiple subsegmental pulmonary emboli without acute cor pulmonale with an SLP diagnosis of oral dysphagia to soft textures as well as dependent feeder on staff, remains at risk for malnutrition/dehydration, if PO intake remains minimal. Primary MD made aware of concerns and diet recs.     Goals:   Multidisciplinary  Problems       SLP Goals       Not on file              Multidisciplinary Problems (Resolved)          Problem: SLP    Goal Priority Disciplines Outcome   SLP Goal   (Resolved)     SLP Met   Description: Short Term Goals:  1. Pt will participate in swallow eval to determine safest diet level  2. Pt will tolerate clear liquid diet with no audible dysphagia.                        Plan:       Plan of Care expires:  09/30/22  Plan of Care reviewed with:  patient   SLP Follow-Up:  No       Discharge recommendations:  nursing facility, basic   Barriers to Discharge:  None    Time Tracking:     SLP Treatment Date:   09/02/22  Speech Start Time:  0916  Speech Stop Time:  0928     Speech Total Time (min):  12 min    Billable Minutes: Treatment Swallowing Dysfunction 12    09/02/2022

## 2022-09-02 NOTE — ASSESSMENT & PLAN NOTE
Chronic problem  Patient hypotensive on initial presentation    Plan:  -Restart home losartan 100mg if HTN returns  -Monitor vitals

## 2022-09-02 NOTE — NURSING
Notified Dr Aceves of pt's aptt results. Instructed by MD to follow nomogram. No new orders at this time. Will continue to monitor.

## 2022-09-02 NOTE — ASSESSMENT & PLAN NOTE
Patient presented in acute hypoxic respiratory distress   CTA showed present of Pulmonary Emboli  Elevated PTT    Plan  Consider transitioning to Eliquis due elevated PTT  Monitor PTT  Monitor Oxygen status

## 2022-09-02 NOTE — CARE UPDATE
[facility]    FACILITY TRANSFER ORDERS      Patient Name: Adalberto Medina  YOB: 1940    PCP: Primary Doctor No   PCP Address: None  PCP Phone Number: None  PCP Fax: None    Encounter Date: 09/04/2022    Admit to: SNF    Vital Signs:  Routine    Diagnoses:   Active Hospital Problems    Diagnosis  POA    *Multiple subsegmental pulmonary emboli without acute cor pulmonale [I26.94]  Yes    Disorientation [R41.0]  Yes    Essential hypertension [I10]  Yes     Formatting of this note is different from the original.  -Normotensive, continue Cozaar 100 mg p.o. daily      Diabetes mellitus due to underlying condition with hyperosmolarity without coma, without long-term current use of insulin [E08.00]  Yes     Formatting of this note might be different from the original.  Unknown A1c,  Per Edward notes on Metformin however was not in the discharge med rec.  Will order Accu-Cheks AC/at bedtime for 72 hours with SSI coverage.  And check A1c on Monday, 9/27/2021.  And initiate therapy if necessary        Resolved Hospital Problems   No resolved problems to display.       Allergies:Review of patient's allergies indicates:  No Known Allergies    Diet: pureed diet thin and boost for added supplements and needs assist with feeding, crush meds, must be alert and awake, single sips of liquids    Activities: Activity as tolerated    Goals of Care Treatment Preferences:  Code Status: DNR      Nursing: needs assist with feeding, crush meds, must be alert and awake, single sips of liquid        Labs: per nursing facility    CONSULTS:    Physical Therapy to evaluate and treat. , Occupational Therapy to evaluate and treat., and Speech Therapy to evaluate and treat for Swallowing.    MISCELLANEOUS CARE:  Routine Skin for Bedridden Patients: Apply moisture barrier cream to all skin folds and wet areas in perineal area daily and after baths and all bowel movements.    WOUND CARE ORDERS  - Nursing to maintain Pressure  injury prevention interventions to include waffle overlay and heel boots  - Triad ointment to sacrum/coccyx BID prn incontinent episode  - Mepilex border to R ear, and bilateral heels    Medications: Review discharge medications with patient and family and provide education.      Current Discharge Medication List        START taking these medications    Details   !! apixaban (ELIQUIS) 5 mg Tab Take 2 tablets (10 mg total) by mouth 2 (two) times daily. for 6 days  Qty: 24 tablet, Refills: 0      !! apixaban (ELIQUIS) 5 mg Tab Take 1 tablet (5 mg total) by mouth 2 (two) times daily.  Qty: 60 tablet, Refills: 0    Comments: Start on 09/10/2022, after taking 6 days of Eliquis 10mg BID.       !! - Potential duplicate medications found. Please discuss with provider.        CONTINUE these medications which have NOT CHANGED    Details   acetaminophen (TYLENOL) 500 MG tablet Take 500 mg by mouth 2 (two) times a day.      ALPHAGAN P 0.1 % Drop Place 1 drop into both eyes 2 (two) times a day.      atorvastatin (LIPITOR) 20 MG tablet Take 20 mg by mouth every evening.    Associated Diagnoses: Generalized weakness      dorzolamide (TRUSOPT) 2 % ophthalmic solution Place 1 drop into both eyes 2 (two) times a day.      finasteride (PROSCAR) 5 mg tablet Take 5 mg by mouth once daily.    Associated Diagnoses: Generalized weakness      losartan (COZAAR) 100 MG tablet Take 100 mg by mouth once daily.    Comments: .  Associated Diagnoses: Generalized weakness      fluticasone propionate (FLONASE) 50 mcg/actuation nasal spray 1 spray by Each Nostril route once daily.    Associated Diagnoses: Generalized weakness      latanoprost 0.005 % ophthalmic solution Place into both eyes every evening.    Associated Diagnoses: Generalized weakness      megestroL (MEGACE) 400 mg/10 mL (40 mg/mL) Susp Take 800 mg by mouth Daily.      multivitamin (THERAGRAN) per tablet Take 1 tablet by mouth once daily.      prednisoLONE acetate (PRED FORTE) 1 %  DrpS Place 1 drop into both eyes every evening.    Associated Diagnoses: Generalized weakness      !! risperiDONE (RISPERDAL) 1 MG tablet Take 1 mg by mouth once daily.      !! risperiDONE (RISPERDAL) 2 MG tablet Take 2 mg by mouth nightly.      tamsulosin (FLOMAX) 0.4 mg Cap Take 0.4 mg by mouth every evening.    Associated Diagnoses: Generalized weakness      timolol maleate 0.5% (TIMOPTIC) 0.5 % Drop Place 1 drop into both eyes 2 (two) times daily.      zinc sulfate (ZINCATE) 50 mg zinc (220 mg) capsule Take 220 mg by mouth once daily.       !! - Potential duplicate medications found. Please discuss with provider.             Immunizations Administered as of 9/2/2022       No immunizations on file.            Some patients may experience side effects after vaccination.  These may include fever, headache, muscle or joint aches.  Most symptoms resolve with 24-48 hours and do not require urgent medical evaluation unless they persist for more than 72 hours or symptoms are concerning for an unrelated medical condition.          _________________________________  Ferdinand Vyas MD  09/02/2022

## 2022-09-02 NOTE — SUBJECTIVE & OBJECTIVE
Interval History: Patient somnolent. Elevated PTT, consider discontinue Heparin Drip, consider starting Eliquis.    Review of Systems   Unable to perform ROS: Mental status change   Objective:     Vital Signs (Most Recent):  Temp: 97.5 °F (36.4 °C) (09/02/22 0740)  Pulse: 83 (09/02/22 0740)  Resp: 18 (09/02/22 0740)  BP: (!) 128/56 (09/02/22 0740)  SpO2: (!) 94 % (09/02/22 0740)   Vital Signs (24h Range):  Temp:  [96.8 °F (36 °C)-98.5 °F (36.9 °C)] 97.5 °F (36.4 °C)  Pulse:  [] 83  Resp:  [14-18] 18  SpO2:  [94 %-100 %] 94 %  BP: (128-148)/(56-86) 128/56     Weight: 63.5 kg (139 lb 15.9 oz)  Body mass index is 19.52 kg/m².    Intake/Output Summary (Last 24 hours) at 9/2/2022 0808  Last data filed at 9/2/2022 0600  Gross per 24 hour   Intake 1305.2 ml   Output 350 ml   Net 955.2 ml      Physical Exam  Constitutional:       General: He is not in acute distress.     Comments: Somnolent   HENT:      Head: Normocephalic.      Right Ear: External ear normal.      Left Ear: External ear normal.      Nose: Nose normal.      Mouth/Throat:      Mouth: Mucous membranes are dry.   Eyes:      Comments: Patient is blind   Cardiovascular:      Rate and Rhythm: Normal rate.      Pulses: Normal pulses.   Pulmonary:      Effort: Pulmonary effort is normal.   Abdominal:      Palpations: Abdomen is soft.   Musculoskeletal:      Cervical back: Normal range of motion.   Skin:     Capillary Refill: Capillary refill takes less than 2 seconds.   Neurological:      Comments: Aox1 person.  Requiring stimulation to rouse. Unable respond to questions and commands this AM.       Significant Labs: All pertinent labs within the past 24 hours have been reviewed.  CBC:   Recent Labs   Lab 08/31/22  2252 09/01/22  0529 09/02/22  0557   WBC 5.51 5.22 4.54   HGB 11.4* 12.3* 11.6*   HCT 35.2* 37.6* 35.4*    251 255     CMP:   Recent Labs   Lab 08/31/22  2252 09/01/22  0529 09/02/22  0556    141 139   K 4.1 4.0 3.9    106 106    CO2 19* 21* 22*   * 129* 86   BUN 30* 28* 16   CREATININE 0.8 0.8 0.6   CALCIUM 8.9 9.1 8.6*   PROT 6.6 7.1 6.1   ALBUMIN 2.9* 3.1* 2.7*   BILITOT 0.9 0.8 1.0   ALKPHOS 101 112 89   AST 28 30 22   ALT 28 27 25   ANIONGAP 14 14 11       Significant Imaging: I have reviewed all pertinent imaging results/findings within the past 24 hours.

## 2022-09-02 NOTE — HOSPITAL COURSE
"Started on heparin drip and Vancomycin. SLP evaluation for intolerance of PO intake. Recommended thin liquids and assistance with feeding Code status changed to DNR. Blood Cxs inially pos for Staph. Staph/MRSA PCR negative (determined to be contaminant). Repeat blood Cx NG2D, Abx eventually discontinued. Heparin drip discontinued due to elevated PTT 9/2. He was eventually transitioned to Eliquis 10 BID with a plan to continue for 7 days, and then 5 mg BID thereafter. During the admission, patient noted to be lethargic, endorsed feeling "sad" and "tired", started on Lexapro on 9/3. Palliative care discussed with family. He maintained this decreased mental functioning with no obvious etiology. The rest of his chronic conditions were well managed during the admission. On the day of discharge was hemodynamically stable. He was sent back to SNF to continue home meds as well as to continue Eliquis and Lexapro. In SNF, patient should continue pureed diet thin and boost for added supplements. He should be assisted with feeding, crush meds, Must be alert and awake and have single sips of liquids. PT/OT/SLP to evaluate and treat. Rest of orders per hostrans. Patient's daughter agreeable to discharge plan, expressed understanding, all questions answered, return precautions were discussed.  "

## 2022-09-02 NOTE — PROGRESS NOTES
[facility]  Progress Note    Patient Name: Adalberto Medina  MRN: [id number]  Patient Class: OP- Observation   Admission Date: 8/31/2022  Length of Stay: 0 days  Attending Physician: López Mota III, MD  Primary Care Provider: Primary Doctor No        Subjective:     Principal Problem:Multiple subsegmental pulmonary emboli without acute cor pulmonale        HPI:  82-year-old male with a past medical history of legal blindness, type 2 diabetes, essential hypertension, sacral wounds presents to the ED for altered mental status and shortness of breath. Patient was brought to the ED from [facility] due to change in mentation and activity.  Nursing home staff reports patient has not been eating much for the past week, and has become increasingly withdrawn and unresponsive. Patient's baseline is reported as Aox4 and fully conversant. Patient has been seen recently for a similar presentation and was treated for an UTI. Patient was hypoxic and minimally responsive on arrival to ED.     In ED patient was started on non-re breather mask O2 and given IV fluid bolus. Lab results did not indicate any acute infection, and CT head showed no signs of acute infarct or hemorrhage. Patients mental stats improved during his stay in the ED. A CTA chest was ordered and showed the presence of 2 pulmonary emboli. Patient is now satting 100% on room and responds to questions. Patient to be admitted to [facility] for management of resolving PE and AMS.      Overview/Hospital Course:  No notes on file    Interval History: Patient somnolent. Elevated PTT, consider discontinue Heparin Drip, consider starting Eliquis.    Review of Systems   Unable to perform ROS: Mental status change   Objective:     Vital Signs (Most Recent):  Temp: 97.5 °F (36.4 °C) (09/02/22 0740)  Pulse: 83 (09/02/22 0740)  Resp: 18 (09/02/22 0740)  BP: (!) 128/56 (09/02/22 0740)  SpO2: (!) 94 % (09/02/22 0740)   Vital Signs (24h Range):  Temp:  [96.8 °F (36  °C)-98.5 °F (36.9 °C)] 97.5 °F (36.4 °C)  Pulse:  [] 83  Resp:  [14-18] 18  SpO2:  [94 %-100 %] 94 %  BP: (128-148)/(56-86) 128/56     Weight: 63.5 kg (139 lb 15.9 oz)  Body mass index is 19.52 kg/m².    Intake/Output Summary (Last 24 hours) at 9/2/2022 0808  Last data filed at 9/2/2022 0600  Gross per 24 hour   Intake 1305.2 ml   Output 350 ml   Net 955.2 ml      Physical Exam  Constitutional:       General: He is not in acute distress.     Comments: Somnolent   HENT:      Head: Normocephalic.      Right Ear: External ear normal.      Left Ear: External ear normal.      Nose: Nose normal.      Mouth/Throat:      Mouth: Mucous membranes are dry.   Eyes:      Comments: Patient is blind   Cardiovascular:      Rate and Rhythm: Normal rate.      Pulses: Normal pulses.   Pulmonary:      Effort: Pulmonary effort is normal.   Abdominal:      Palpations: Abdomen is soft.   Musculoskeletal:      Cervical back: Normal range of motion.   Skin:     Capillary Refill: Capillary refill takes less than 2 seconds.   Neurological:      Comments: Aox1 person.  Requiring stimulation to rouse. Unable respond to questions and commands this AM.       Significant Labs: All pertinent labs within the past 24 hours have been reviewed.  CBC:   Recent Labs   Lab 08/31/22 2252 09/01/22  0529 09/02/22  0557   WBC 5.51 5.22 4.54   HGB 11.4* 12.3* 11.6*   HCT 35.2* 37.6* 35.4*    251 255     CMP:   Recent Labs   Lab 08/31/22 2252 09/01/22  0529 09/02/22  0556    141 139   K 4.1 4.0 3.9    106 106   CO2 19* 21* 22*   * 129* 86   BUN 30* 28* 16   CREATININE 0.8 0.8 0.6   CALCIUM 8.9 9.1 8.6*   PROT 6.6 7.1 6.1   ALBUMIN 2.9* 3.1* 2.7*   BILITOT 0.9 0.8 1.0   ALKPHOS 101 112 89   AST 28 30 22   ALT 28 27 25   ANIONGAP 14 14 11       Significant Imaging: I have reviewed all pertinent imaging results/findings within the past 24 hours.      Assessment/Plan:      * Multiple subsegmental pulmonary emboli without acute cor  pulmonale  Patient presented in acute hypoxic respiratory distress   CTA showed present of Pulmonary Emboli  Elevated PTT    Plan  Consider transitioning to Eliquis due elevated PTT  Monitor PTT  Monitor Oxygen status        Disorientation  Patient diminished functional status from baseline  Patient unable to safely swallow  Responds to questions and commands  CT head showed no new pathology  Speech recommend this liquids and assist with feeding.       Plan  Thin Liquids  Monitor Neuro status  Monitor Electrolytes  Delirium precautions  Consider Neurology consult if symptoms do not improve      Diabetes mellitus due to underlying condition with hyperosmolarity without coma, without long-term current use of insulin  Patient diabetes not currently medicated    Plan:  POCT Glucose TID  Insulin Sliding scale      Essential hypertension  Chronic problem  Patient hypotensive on initial presentation    Plan:  -Restart home losartan 100mg if HTN returns  -Monitor vitals      VTE Risk Mitigation (From admission, onward)         Ordered     heparin 25,000 units in dextrose 5% (100 units/ml) IV bolus from bag - ADDITIONAL PRN BOLUS - 60 units/kg  As needed (PRN)        Question:  Heparin Infusion Adjustment (DO NOT MODIFY ANSWER)  Answer:  \\[email or web address]\epic\Images\Pharmacy\HeparinInfusions\heparin HIGH INTENSITY nomogram for [facility] [id number].pdf    09/01/22 0449     heparin 25,000 units in dextrose 5% (100 units/ml) IV bolus from bag - ADDITIONAL PRN BOLUS - 30 units/kg  As needed (PRN)        Question:  Heparin Infusion Adjustment (DO NOT MODIFY ANSWER)  Answer:  \\[facility]\epic\Images\Pharmacy\HeparinInfusions\heparin HIGH INTENSITY nomogram for [facility] [id number].pdf    09/01/22 0449     heparin 25,000 units in dextrose 5% 250 mL (100 units/mL) infusion HIGH INTENSITY nomogram - [facility]  Continuous        Question Answer Comment   Heparin Infusion Adjustment (DO NOT MODIFY ANSWER)  \\[email or web address]\epic\Images\Pharmacy\HeparinInfusions\heparin HIGH INTENSITY nomogram for [facility] [id number].pdf    Begin at (in units/kg/hr) 18        09/01/22 0449     IP VTE HIGH RISK PATIENT  Once         09/01/22 0258     Place sequential compression device  Until discontinued         09/01/22 0258                Discharge Planning   KAYCE: 9/2/2022     Code Status: DNR   Is the patient medically ready for discharge?:     Reason for patient still in hospital (select all that apply): Patient unstable and Treatment  Discharge Plan A: Return to nursing home   Discharge Delays: None known at this time                Melissa Chavez MD  [facility], PGY-1  09/02/2022

## 2022-09-02 NOTE — PLAN OF CARE
Contacted [facility] regarding possible return to facility tomorrow as he is jail.  Anticipate new order for eliquis upon return, per Suellen avina to send orders with eliquis.    Pt has been approved for return to facility tomorrow pending medical stability should current NH orders/med list remain the same.  Should eliquis be d/c'd please update orders and notify NH staff.  Pt will not be able to return over weekend should any new medications be added as their pharmacy will not reopen until Monday.    Marcy Calderon RN [facility]  Supervisor Case Management-[place]  [phone]

## 2022-09-02 NOTE — PLAN OF CARE
Pt is sleeping between care but is easily arousable to voice and touch. Pt with drsg to sacrum changed this shift. External cath in place draining yellow urine. Pt being turned in bed using wedge and pillows. Pt remains on waffle mattress and z flex boots are on. Heparin drip was d/c'd this shift.

## 2022-09-02 NOTE — PLAN OF CARE
Problem: SLP  Goal: SLP Goal  Description: Short Term Goals:  1. Pt will participate in swallow eval to determine safest diet level  2. Pt will tolerate clear liquid diet with no audible dysphagia.   Outcome: Met   Pt to be advanced to pureed and thin liquids, boost for added supplement. Needs assist for feeding for all meals. Notified MD of above diet recs.

## 2022-09-02 NOTE — PROGRESS NOTES
Therapy with vancomycin complete and/or consult discontinued by provider.  Pharmacy will sign off, please re-consult as needed.     Thanks,    Cisco Klein, Pharm.D

## 2022-09-03 PROBLEM — F32.A DEPRESSION: Status: ACTIVE | Noted: 2022-09-03

## 2022-09-03 LAB
ALBUMIN SERPL BCP-MCNC: 2.6 G/DL (ref 3.5–5.2)
ALP SERPL-CCNC: 88 U/L (ref 55–135)
ALT SERPL W/O P-5'-P-CCNC: 25 U/L (ref 10–44)
ANION GAP SERPL CALC-SCNC: 13 MMOL/L (ref 8–16)
AST SERPL-CCNC: 25 U/L (ref 10–40)
BASOPHILS # BLD AUTO: 0.01 K/UL (ref 0–0.2)
BASOPHILS NFR BLD: 0.2 % (ref 0–1.9)
BILIRUB SERPL-MCNC: 0.9 MG/DL (ref 0.1–1)
BUN SERPL-MCNC: 14 MG/DL (ref 8–23)
CALCIUM SERPL-MCNC: 8.4 MG/DL (ref 8.7–10.5)
CHLORIDE SERPL-SCNC: 107 MMOL/L (ref 95–110)
CO2 SERPL-SCNC: 18 MMOL/L (ref 23–29)
CREAT SERPL-MCNC: 0.7 MG/DL (ref 0.5–1.4)
DIFFERENTIAL METHOD: ABNORMAL
EOSINOPHIL # BLD AUTO: 0 K/UL (ref 0–0.5)
EOSINOPHIL NFR BLD: 0.6 % (ref 0–8)
ERYTHROCYTE [DISTWIDTH] IN BLOOD BY AUTOMATED COUNT: 13.8 % (ref 11.5–14.5)
EST. GFR  (NO RACE VARIABLE): >60 ML/MIN/1.73 M^2
GLUCOSE SERPL-MCNC: 88 MG/DL (ref 70–110)
HCT VFR BLD AUTO: 33.5 % (ref 40–54)
HGB BLD-MCNC: 10.9 G/DL (ref 14–18)
IMM GRANULOCYTES # BLD AUTO: 0.02 K/UL (ref 0–0.04)
IMM GRANULOCYTES NFR BLD AUTO: 0.3 % (ref 0–0.5)
LYMPHOCYTES # BLD AUTO: 1.7 K/UL (ref 1–4.8)
LYMPHOCYTES NFR BLD: 26 % (ref 18–48)
MAGNESIUM SERPL-MCNC: 1.8 MG/DL (ref 1.6–2.6)
MCH RBC QN AUTO: 27.5 PG (ref 27–31)
MCHC RBC AUTO-ENTMCNC: 32.5 G/DL (ref 32–36)
MCV RBC AUTO: 84 FL (ref 82–98)
MONOCYTES # BLD AUTO: 1.1 K/UL (ref 0.3–1)
MONOCYTES NFR BLD: 17 % (ref 4–15)
NEUTROPHILS # BLD AUTO: 3.7 K/UL (ref 1.8–7.7)
NEUTROPHILS NFR BLD: 55.9 % (ref 38–73)
NRBC BLD-RTO: 0 /100 WBC
PHOSPHATE SERPL-MCNC: 2.9 MG/DL (ref 2.7–4.5)
PLATELET # BLD AUTO: 217 K/UL (ref 150–450)
PMV BLD AUTO: 9.1 FL (ref 9.2–12.9)
POCT GLUCOSE: 107 MG/DL (ref 70–110)
POCT GLUCOSE: 119 MG/DL (ref 70–110)
POCT GLUCOSE: 90 MG/DL (ref 70–110)
POCT GLUCOSE: 99 MG/DL (ref 70–110)
POTASSIUM SERPL-SCNC: 4.1 MMOL/L (ref 3.5–5.1)
PROT SERPL-MCNC: 6 G/DL (ref 6–8.4)
RBC # BLD AUTO: 3.97 M/UL (ref 4.6–6.2)
SODIUM SERPL-SCNC: 138 MMOL/L (ref 136–145)
VANCOMYCIN TROUGH SERPL-MCNC: 13.1 UG/ML (ref 10–22)
WBC # BLD AUTO: 6.53 K/UL (ref 3.9–12.7)

## 2022-09-03 PROCEDURE — 83735 ASSAY OF MAGNESIUM: CPT | Performed by: STUDENT IN AN ORGANIZED HEALTH CARE EDUCATION/TRAINING PROGRAM

## 2022-09-03 PROCEDURE — A4216 STERILE WATER/SALINE, 10 ML: HCPCS | Performed by: FAMILY MEDICINE

## 2022-09-03 PROCEDURE — 63600175 PHARM REV CODE 636 W HCPCS: Performed by: FAMILY MEDICINE

## 2022-09-03 PROCEDURE — 80202 ASSAY OF VANCOMYCIN: CPT | Performed by: FAMILY MEDICINE

## 2022-09-03 PROCEDURE — 36415 COLL VENOUS BLD VENIPUNCTURE: CPT | Performed by: FAMILY MEDICINE

## 2022-09-03 PROCEDURE — 84100 ASSAY OF PHOSPHORUS: CPT | Performed by: STUDENT IN AN ORGANIZED HEALTH CARE EDUCATION/TRAINING PROGRAM

## 2022-09-03 PROCEDURE — 36415 COLL VENOUS BLD VENIPUNCTURE: CPT | Performed by: STUDENT IN AN ORGANIZED HEALTH CARE EDUCATION/TRAINING PROGRAM

## 2022-09-03 PROCEDURE — 25000003 PHARM REV CODE 250

## 2022-09-03 PROCEDURE — 80053 COMPREHEN METABOLIC PANEL: CPT | Performed by: STUDENT IN AN ORGANIZED HEALTH CARE EDUCATION/TRAINING PROGRAM

## 2022-09-03 PROCEDURE — 25000003 PHARM REV CODE 250: Performed by: FAMILY MEDICINE

## 2022-09-03 PROCEDURE — 85025 COMPLETE CBC W/AUTO DIFF WBC: CPT | Performed by: STUDENT IN AN ORGANIZED HEALTH CARE EDUCATION/TRAINING PROGRAM

## 2022-09-03 PROCEDURE — 21400001 HC TELEMETRY ROOM

## 2022-09-03 PROCEDURE — 25000003 PHARM REV CODE 250: Performed by: STUDENT IN AN ORGANIZED HEALTH CARE EDUCATION/TRAINING PROGRAM

## 2022-09-03 RX ORDER — ESCITALOPRAM OXALATE 5 MG/1
5 TABLET ORAL DAILY
Status: DISCONTINUED | OUTPATIENT
Start: 2022-09-03 | End: 2022-09-04 | Stop reason: HOSPADM

## 2022-09-03 RX ADMIN — TIMOLOL MALEATE 1 DROP: 5 SOLUTION/ DROPS OPHTHALMIC at 09:09

## 2022-09-03 RX ADMIN — ESCITALOPRAM OXALATE 5 MG: 5 TABLET, FILM COATED ORAL at 12:09

## 2022-09-03 RX ADMIN — TIMOLOL MALEATE 1 DROP: 5 SOLUTION/ DROPS OPHTHALMIC at 08:09

## 2022-09-03 RX ADMIN — SODIUM CHLORIDE, PRESERVATIVE FREE 10 ML: 5 INJECTION INTRAVENOUS at 05:09

## 2022-09-03 RX ADMIN — PREDNISOLONE ACETATE 1 DROP: 10 SUSPENSION/ DROPS OPHTHALMIC at 09:09

## 2022-09-03 RX ADMIN — SODIUM CHLORIDE, PRESERVATIVE FREE 10 ML: 5 INJECTION INTRAVENOUS at 12:09

## 2022-09-03 RX ADMIN — VANCOMYCIN HYDROCHLORIDE 1500 MG: 1.5 INJECTION, POWDER, LYOPHILIZED, FOR SOLUTION INTRAVENOUS at 10:09

## 2022-09-03 RX ADMIN — SODIUM CHLORIDE, PRESERVATIVE FREE 10 ML: 5 INJECTION INTRAVENOUS at 11:09

## 2022-09-03 RX ADMIN — APIXABAN 10 MG: 5 TABLET, FILM COATED ORAL at 08:09

## 2022-09-03 RX ADMIN — FLUTICASONE PROPIONATE 50 MCG: 50 SPRAY, METERED NASAL at 08:09

## 2022-09-03 RX ADMIN — LATANOPROST 1 DROP: 50 SOLUTION OPHTHALMIC at 09:09

## 2022-09-03 RX ADMIN — APIXABAN 10 MG: 5 TABLET, FILM COATED ORAL at 09:09

## 2022-09-03 NOTE — SUBJECTIVE & OBJECTIVE
Interval History: Patient somnolent. Responds to person. Endorses feeling tired and sad. Patient started on Eliquis Yesterday.     Preliminary results of blood cultures on admission showed Gram positive cocci in clusters resembling Staph . MRSA/SA Rapid ID by PCR from Blood culture obtained yesterday negative. Preliminary results from Blood cultures obtained yesterday were negative. Currently on Vanc.      Review of Systems   Unable to perform ROS: Mental status change   Objective:     Vital Signs (Most Recent):  Temp: 97.2 °F (36.2 °C) (09/03/22 0741)  Pulse: 86 (09/03/22 0741)  Resp: 18 (09/03/22 0741)  BP: (!) 100/57 (09/03/22 0741)  SpO2: 100 % (09/03/22 0345)   Vital Signs (24h Range):  Temp:  [97.2 °F (36.2 °C)-98.6 °F (37 °C)] 97.2 °F (36.2 °C)  Pulse:  [79-96] 86  Resp:  [18-20] 18  SpO2:  [94 %-100 %] 100 %  BP: (100-119)/(53-63) 100/57     Weight: 63.8 kg (140 lb 10.5 oz)  Body mass index is 19.62 kg/m².    Intake/Output Summary (Last 24 hours) at 9/3/2022 0904  Last data filed at 9/3/2022 0345  Gross per 24 hour   Intake 549.6 ml   Output 250 ml   Net 299.6 ml        Physical Exam  Constitutional:       General: He is not in acute distress.     Comments: Somnolent   HENT:      Head: Normocephalic.      Right Ear: External ear normal.      Left Ear: External ear normal.      Nose: Nose normal.      Mouth/Throat:      Mouth: Mucous membranes are dry.   Eyes:      Comments: Patient is blind   Cardiovascular:      Rate and Rhythm: Normal rate.      Pulses: Normal pulses.   Pulmonary:      Effort: Pulmonary effort is normal.   Abdominal:      Palpations: Abdomen is soft.   Musculoskeletal:      Cervical back: Normal range of motion.   Skin:     Capillary Refill: Capillary refill takes less than 2 seconds.   Neurological:      Comments: Aox1 person.  Requiring stimulation to rouse. Unable respond to questions and commands this AM.       Significant Labs: All pertinent labs within the past 24 hours have been  reviewed.  CBC:   Recent Labs   Lab 09/02/22  0557 09/03/22  0724   WBC 4.54 6.53   HGB 11.6* 10.9*   HCT 35.4* 33.5*    217       CMP:   Recent Labs   Lab 09/02/22  0556 09/03/22  0622    138   K 3.9 4.1    107   CO2 22* 18*   GLU 86 88   BUN 16 14   CREATININE 0.6 0.7   CALCIUM 8.6* 8.4*   PROT 6.1 6.0   ALBUMIN 2.7* 2.6*   BILITOT 1.0 0.9   ALKPHOS 89 88   AST 22 25   ALT 25 25   ANIONGAP 11 13         Significant Imaging: I have reviewed all pertinent imaging results/findings within the past 24 hours.

## 2022-09-03 NOTE — PROGRESS NOTES
"[facility]  Progress Note    Patient Name: Adalberto Medina  MRN: [id number]  Patient Class: IP- Inpatient   Admission Date: 8/31/2022  Length of Stay: 1 days  Attending Physician: López Mota III, MD  Primary Care Provider: Primary Doctor No        Subjective:     Principal Problem:Multiple subsegmental pulmonary emboli without acute cor pulmonale        HPI:  82-year-old male with a past medical history of legal blindness, type 2 diabetes, essential hypertension, sacral wounds presents to the ED for altered mental status and shortness of breath. Patient was brought to the ED from [facility] due to change in mentation and activity.  Nursing home staff reports patient has not been eating much for the past week, and has become increasingly withdrawn and unresponsive. Patient's baseline is reported as Aox4 and fully conversant. Patient has been seen recently for a similar presentation and was treated for an UTI. Patient was hypoxic and minimally responsive on arrival to ED.     In ED patient was started on non-re breather mask O2 and given IV fluid bolus. Lab results did not indicate any acute infection, and CT head showed no signs of acute infarct or hemorrhage. Patients mental stats improved during his stay in the ED. A CTA chest was ordered and showed the presence of 2 pulmonary emboli. Patient is now satting 100% on room and responds to questions. Patient to be admitted to [facility] for management of resolving PE and AMS.      Overview/Hospital Course:  Patient admitted on 8/31 for multiple subsegmental pulmonary emboli and AMS. Started on heparin drip and Vancomycin. SLP evaluation for intolerance of PO intake. Phosphate and magnesium repleted.Code status changed to DNR. 8/31 Blood culture negative x2 days and Vancomycin discontinued. Heparin drip discontinued due to elevated PTT 9/2. Eliquis started on 9/2. Patient noted lethargic, endorsed feeling "sad" and "tired", started on Lexapro on 9/3. " Palliative care discussed with family.    Interval History: Patient somnolent. Responds to name. Endorses feeling tired and sad. Requires stimulation and prompting to converse. Only gives one word answers.     Review of Systems   Unable to perform ROS: Mental status change   Objective:      Vital Signs (Most Recent):  Temp: 97.5 °F (36.4 °C) (09/02/22 0740)  Pulse: 83 (09/02/22 0740)  Resp: 18 (09/02/22 0740)  BP: (!) 128/56 (09/02/22 0740)  SpO2: (!) 94 % (09/02/22 0740)    Vital Signs (24h Range):  Temp:  [96.8 °F (36 °C)-98.5 °F (36.9 °C)] 97.5 °F (36.4 °C)  Pulse:  [] 83  Resp:  [14-18] 18  SpO2:  [94 %-100 %] 94 %  BP: (128-148)/(56-86) 128/56      Weight: 63.5 kg (139 lb 15.9 oz)  Body mass index is 19.52 kg/m².     Intake/Output Summary (Last 24 hours) at 9/2/2022 0808  Last data filed at 9/2/2022 0600      Gross per 24 hour   Intake 1305.2 ml   Output 350 ml   Net 955.2 ml      Physical Exam  Constitutional:       General: He is not in acute distress.     Comments: Somnolent   HENT:      Head: Normocephalic.      Right Ear: External ear normal.      Left Ear: External ear normal.      Nose: Nose normal.      Mouth/Throat:      Mouth: Mucous membranes are dry.   Eyes:      Comments: Patient is blind   Cardiovascular:      Rate and Rhythm: Normal rate.      Pulses: Normal pulses.   Pulmonary:      Effort: Pulmonary effort is normal.   Abdominal:      Palpations: Abdomen is soft.   Musculoskeletal:      Cervical back: Normal range of motion.   Skin:     Capillary Refill: Capillary refill takes less than 2 seconds.   Neurological:      Comments: Aox1 person.  Requiring stimulation to rouse. Unable respond to questions and commands this AM.         Significant Labs: All pertinent labs within the past 24 hours have been reviewed.  CBC:         Recent Labs   Lab 08/31/22  2252 09/01/22  0529 09/02/22  0557   WBC 5.51 5.22 4.54   HGB 11.4* 12.3* 11.6*   HCT 35.2* 37.6* 35.4*    251 255      CMP:          Recent Labs   Lab 08/31/22  2252 09/01/22  0529 09/02/22  0556    141 139   K 4.1 4.0 3.9    106 106   CO2 19* 21* 22*   * 129* 86   BUN 30* 28* 16   CREATININE 0.8 0.8 0.6   CALCIUM 8.9 9.1 8.6*   PROT 6.6 7.1 6.1   ALBUMIN 2.9* 3.1* 2.7*   BILITOT 0.9 0.8 1.0   ALKPHOS 101 112 89   AST 28 30 22   ALT 28 27 25   ANIONGAP 14 14 11         Significant Imaging: I have reviewed all pertinent imaging results/findings within the past 24 hours.    Assessment/Plan:      * Multiple subsegmental pulmonary emboli without acute cor pulmonale  Patient presented in acute hypoxic respiratory distress   CTA showed present of Pulmonary Emboli  Elevated PTT    Plan  9/2 Started Eliquis 10 mg BID for 7 days.Will follow with Eliquis 5 mg BID  Monitor PTT  Monitor Oxygen status        Depression  Lethargy  Fatigue  Endorse feeling Sad    Plan:  Lexapro 5 mg      Disorientation  Patient diminished functional status from baseline  Patient unable to safely swallow  Responds to questions and commands  CT head showed no new pathology  Speech recommend this liquids and assist with feeding.   Blood Culture from Admission positive for Staph.      Plan  9/2 Blood cultures finalized results pending  Continue Vanc, until negative blood cultures results are obtained  Thin Liquids and Pureed with assist  Monitor Neuro status  Monitor Electrolytes  Delirium precautions  Consider Neurology consult if symptoms do not improve      Diabetes mellitus due to underlying condition with hyperosmolarity without coma, without long-term current use of insulin  Patient diabetes not currently medicated    Plan:  POCT Glucose TID  Insulin Sliding scale      Essential hypertension  Chronic problem  Patient hypotensive on initial presentation    Plan:  -Restart home losartan 100mg if HTN returns  -Monitor vitals      VTE Risk Mitigation (From admission, onward)           Ordered     apixaban tablet 5 mg  2 times daily         09/02/22 0948      apixaban tablet 10 mg  2 times daily         09/02/22 0948     IP VTE HIGH RISK PATIENT  Once         09/01/22 0258     Place sequential compression device  Until discontinued         09/01/22 0258                    Discharge Planning   KAYCE: 9/3/2022     Code Status: DNR   Is the patient medically ready for discharge?:     Reason for patient still in hospital (select all that apply): Patient trending condition  Discharge Plan A: Return to nursing home   Discharge Delays: None known at this time                Melissa Chavez MD  [facility], PGY-1  09/03/2022

## 2022-09-03 NOTE — ASSESSMENT & PLAN NOTE
Patient diminished functional status from baseline  Patient unable to safely swallow  Responds to questions and commands  CT head showed no new pathology  Speech recommend this liquids and assist with feeding.   Blood Culture from Admission positive for Staph.      Plan  9/2 Blood cultures finalized results pending  Continue Vanc, until negative blood cultures results are obtained  Thin Liquids and Pureed with assist  Monitor Neuro status  Monitor Electrolytes  Delirium precautions  Consider Neurology consult if symptoms do not improve

## 2022-09-03 NOTE — ASSESSMENT & PLAN NOTE
Patient diminished functional status from baseline  Patient unable to safely swallow  Responds to questions and commands  CT head showed no new pathology  Speech recommend this liquids and assist with feeding.   Blood Culture from Admission positive for Staph.      Plan  Blood cultures finalized results pending  On Vanc  Thin Liquids and Pureed with assist  Monitor Neuro status  Monitor Electrolytes  Delirium precautions  Consider Neurology consult if symptoms do not improve

## 2022-09-03 NOTE — PROGRESS NOTES
Pharmacokinetic Initial Assessment: IV Vancomycin    Assessment/Plan:  Restarted vanco vr 6.9 will restart vanco 1500mg q24  IDesired empiric serum trough concentration is 15 to 20 mcg/mL  Draw vancomycin trough level 60 min prior to third dose on 9/4 at approximately 2130  Pharmacy will continue to follow and monitor vancomycin.      Please contact pharmacy at extension [phone] with any questions regarding this assessment.     Thank you for the consult,   Rian Duncan       Patient brief summary:  Adalberto Medina is a 82 y.o. male initiated on antimicrobial therapy with IV Vancomycin for treatment of suspected bacteremia    Drug Allergies:   Review of patient's allergies indicates:  No Known Allergies    Actual Body Weight:   63kg    Renal Function:   Estimated Creatinine Clearance: 85.3 mL/min (based on SCr of 0.6 mg/dL).,     Dialysis Method (if applicable):  N/A    CBC (last 72 hours):  Recent Labs   Lab Result Units 08/31/22 2252 09/01/22  0529 09/02/22  0557   WBC K/uL 5.51 5.22 4.54   Hemoglobin g/dL 11.4* 12.3* 11.6*   Hematocrit % 35.2* 37.6* 35.4*   Platelets K/uL 234 251 255   Gran % % 74.6* 76.0* 63.7   Lymph % % 18.5 18.6 28.6   Mono % % 5.8 4.0 6.2   Eosinophil % % 0.5 0.6 0.9   Basophil % % 0.2 0.2 0.2   Differential Method  Automated Automated Automated       Metabolic Panel (last 72 hours):  Recent Labs   Lab Result Units 08/31/22 2200 08/31/22 2252 09/01/22  0529 09/02/22  0556   Sodium mmol/L  --  140 141 139   Potassium mmol/L  --  4.1 4.0 3.9   Chloride mmol/L  --  107 106 106   CO2 mmol/L  --  19* 21* 22*   Glucose mg/dL  --  152* 129* 86   Glucose, UA  Negative  --   --   --    BUN mg/dL  --  30* 28* 16   Creatinine mg/dL  --  0.8 0.8 0.6   Albumin g/dL  --  2.9* 3.1* 2.7*   Total Bilirubin mg/dL  --  0.9 0.8 1.0   Alkaline Phosphatase U/L  --  101 112 89   AST U/L  --  28 30 22   ALT U/L  --  28 27 25   Magnesium mg/dL  --  1.7 1.8 1.5*   Phosphorus mg/dL  --   --  2.2* 2.3*       Drug levels  (last 3 results):  Recent Labs   Lab Result Units 09/02/22 2008   Vancomycin, Random ug/mL 6.9       Microbiologic Results:  Microbiology Results (last 7 days)       Procedure Component Value Units Date/Time    Blood culture [[id number]] Collected: 09/02/22 2008    Order Status: Sent Specimen: Blood Updated: 09/02/22 2008    Narrative:      Collection has been rescheduled by RM at 09/02/2022 19:09 Reason:   Unable to collect/ patient keeps moving notified Nurse   Collection has been rescheduled by [email or web address] at 09/02/2022 19:09 Reason:   Unable to collect/ patient keeps moving notified Nurse     Blood culture [[id number]] Collected: 09/02/22 2008    Order Status: Sent Specimen: Blood Updated: 09/02/22 2008    Narrative:      Collection has been rescheduled by [email or web address] at 09/02/2022 19:09 Reason:   Unable to collect/ patient keeps moving notified Nurse   Collection has been rescheduled by [facility] at 09/02/2022 19:09 Reason:   Unable to collect/ patient keeps moving notified Nurse     MRSA/SA Rapid ID by PCR from Blood culture [[id number]] Collected: 09/02/22 1451    Order Status: Completed Updated: 09/02/22 1606     Staph aureus ID by PCR Negative     MRSA ID by PCR Negative    Narrative:      Aerobic and anaerobic    Blood culture x two cultures. Draw prior to antibiotics. [[id number]] Collected: 08/31/22 2255    Order Status: Completed Specimen: Blood from Peripheral, Upper Arm, Right Updated: 09/02/22 1456     Blood Culture, Routine Gram stain aer bottle: Gram positive cocci in clusters resembling Staph      Results called to and read back by: Ashley Nogueira RN 09/02/2022      14:56    Narrative:      Aerobic and anaerobic    Blood culture x two cultures. Draw prior to antibiotics. [[id number]] Collected: 09/01/22 0028    Order Status: Completed Specimen: Blood from Peripheral, Wrist, Left Updated: 09/02/22 1212     Blood Culture, Routine No Growth to date      No Growth to date    Narrative:      Aerobic and anaerobic

## 2022-09-03 NOTE — NURSING
Meds crushed and mixed with pudding for morning administration. Pt tolerated well, needed repeated verbal reminders to swallow. Assisted patient with breakfast, refused to eat more than 3 bites of food. Consumed 25% of boost supplement drink.

## 2022-09-03 NOTE — ASSESSMENT & PLAN NOTE
Patient presented in acute hypoxic respiratory distress   CTA showed present of Pulmonary Emboli  Elevated PTT    Plan  9/2 Started Eliquis 10 mg BID for 7 days.Will follow with Eliquis 5 mg BID  Monitor PTT  Monitor Oxygen status

## 2022-09-04 VITALS
TEMPERATURE: 98 F | RESPIRATION RATE: 18 BRPM | HEART RATE: 81 BPM | WEIGHT: 139.13 LBS | SYSTOLIC BLOOD PRESSURE: 123 MMHG | OXYGEN SATURATION: 95 % | DIASTOLIC BLOOD PRESSURE: 55 MMHG | HEIGHT: 71 IN | BODY MASS INDEX: 19.48 KG/M2

## 2022-09-04 LAB
BACTERIA BLD CULT: ABNORMAL
POCT GLUCOSE: 125 MG/DL (ref 70–110)
POCT GLUCOSE: 94 MG/DL (ref 70–110)

## 2022-09-04 PROCEDURE — 25000003 PHARM REV CODE 250: Performed by: STUDENT IN AN ORGANIZED HEALTH CARE EDUCATION/TRAINING PROGRAM

## 2022-09-04 PROCEDURE — 25000003 PHARM REV CODE 250

## 2022-09-04 PROCEDURE — A4216 STERILE WATER/SALINE, 10 ML: HCPCS | Performed by: FAMILY MEDICINE

## 2022-09-04 PROCEDURE — 25000003 PHARM REV CODE 250: Performed by: FAMILY MEDICINE

## 2022-09-04 RX ORDER — ESCITALOPRAM OXALATE 5 MG/1
5 TABLET ORAL DAILY
Qty: 30 TABLET | Refills: 11 | Status: SHIPPED | OUTPATIENT
Start: 2022-09-05 | End: 2023-09-05

## 2022-09-04 RX ADMIN — APIXABAN 10 MG: 5 TABLET, FILM COATED ORAL at 08:09

## 2022-09-04 RX ADMIN — ESCITALOPRAM OXALATE 5 MG: 5 TABLET, FILM COATED ORAL at 08:09

## 2022-09-04 RX ADMIN — FLUTICASONE PROPIONATE 50 MCG: 50 SPRAY, METERED NASAL at 08:09

## 2022-09-04 RX ADMIN — SODIUM CHLORIDE, PRESERVATIVE FREE 10 ML: 5 INJECTION INTRAVENOUS at 12:09

## 2022-09-04 RX ADMIN — SODIUM CHLORIDE, PRESERVATIVE FREE 10 ML: 5 INJECTION INTRAVENOUS at 05:09

## 2022-09-04 RX ADMIN — TIMOLOL MALEATE 1 DROP: 5 SOLUTION/ DROPS OPHTHALMIC at 08:09

## 2022-09-04 RX ADMIN — SODIUM CHLORIDE, PRESERVATIVE FREE 10 ML: 5 INJECTION INTRAVENOUS at 06:09

## 2022-09-04 NOTE — NURSING
Report called to Jennifer (nurse) at [facility]. All questions answered; no further questions at this time.

## 2022-09-04 NOTE — PLAN OF CARE
At time of discharge pt to be transported back to [facility]. Pt to be transported via Ambulance. Pt to be seen by facility physician. SW to place call to family to inform of discharge back to facility.        09/04/22 1329   Final Note   Assessment Type Final Discharge Note   Anticipated Discharge Disposition Home   What phone number can be called within the next 1-3 days to see how you are doing after discharge? [phone]   Post-Acute Status   Post-Acute Authorization Placement   Post-Acute Placement Status Set-up Complete/Auth obtained   Discharge Delays None known at this time     No future appointments.     LIZA Reynolds Case Management  [phone]

## 2022-09-04 NOTE — SUBJECTIVE & OBJECTIVE
Interval History: Patient somnolent. Responds to person. Endorses feeling tired and sad. Patient started on Eliquis Yesterday.     Preliminary results of blood cultures on admission showed Gram positive cocci in clusters resembling Staph . MRSA/SA Rapid ID by PCR from Blood culture obtained yesterday negative. Preliminary results from Blood cultures obtained yesterday were negative. Currently on Vanc.      Review of Systems   Unable to perform ROS: Mental status change   Objective:     Vital Signs (Most Recent):  Temp: 97 °F (36.1 °C) (09/04/22 0730)  Pulse: 89 (09/04/22 0730)  Resp: 18 (09/04/22 0730)  BP: (!) 107/57 (09/04/22 0730)  SpO2: 95 % (09/04/22 0326)   Vital Signs (24h Range):  Temp:  [96.3 °F (35.7 °C)-98.6 °F (37 °C)] 97 °F (36.1 °C)  Pulse:  [77-92] 89  Resp:  [18] 18  SpO2:  [95 %-97 %] 95 %  BP: (106-144)/(50-66) 107/57     Weight: 63.1 kg (139 lb 1.8 oz)  Body mass index is 19.4 kg/m².    Intake/Output Summary (Last 24 hours) at 9/4/2022 0826  Last data filed at 9/4/2022 0800  Gross per 24 hour   Intake 240 ml   Output --   Net 240 ml        Physical Exam  Constitutional:       General: He is not in acute distress.     Comments: Somnolent   HENT:      Head: Normocephalic.      Right Ear: External ear normal.      Left Ear: External ear normal.      Nose: Nose normal.      Mouth/Throat:      Mouth: Mucous membranes are dry.   Eyes:      Pupils: Pupils are equal, round, and reactive to light.      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      Pulses: Normal pulses.   Pulmonary:      Effort: Pulmonary effort is normal.   Abdominal:      General: Bowel sounds are normal. There is no distension.      Palpations: Abdomen is soft.      Tenderness: There is no abdominal tenderness.   Musculoskeletal:      Cervical back: Normal range of motion.      Right lower leg: No edema.      Left lower leg: No edema.   Skin:     Capillary Refill: Capillary refill takes less than 2  seconds.   Neurological:      Mental Status: He is disoriented.      Comments: Aox1 person.  Requiring stimulation to rouse. Unable respond to questions and commands this AM.       Significant Labs: All pertinent labs within the past 24 hours have been reviewed.  CBC:   Recent Labs   Lab 09/03/22  0724   WBC 6.53   HGB 10.9*   HCT 33.5*          CMP:   Recent Labs   Lab 09/03/22  0622      K 4.1      CO2 18*   GLU 88   BUN 14   CREATININE 0.7   CALCIUM 8.4*   PROT 6.0   ALBUMIN 2.6*   BILITOT 0.9   ALKPHOS 88   AST 25   ALT 25   ANIONGAP 13         Significant Imaging: I have reviewed all pertinent imaging results/findings within the past 24 hours.

## 2022-09-04 NOTE — PROGRESS NOTES
Pharmacokinetic Assessment Follow Up: IV Vancomycin    Vancomycin serum concentration assessment(s):    The trough level was drawn correctly and can be used to guide therapy at this time. The measurement is within the desired definitive target range of 10 to 15 mcg/mL.    Vancomycin Regimen Plan:    Continue regimen to Vancomycin 1500 mg IV every 24 hours with next serum trough concentration measured at 9/5 prior to 3 dose on 2100    Drug levels (last 3 results):  Recent Labs   Lab Result Units 09/02/22 2008 09/03/22  2114   Vancomycin, Random ug/mL 6.9  --    Vancomycin-Trough ug/mL  --  13.1       Pharmacy will continue to follow and monitor vancomycin.    Please contact pharmacy at extension [phone] for questions regarding this assessment.    Thank you for the consult,   Rian Duncan       Patient brief summary:  Adalberto Medina is a 82 y.o. male initiated on antimicrobial therapy with IV Vancomycin for treatment of skin & soft tissue infection    The patient's current regimen is vanco 1500mg q24    Drug Allergies:   Review of patient's allergies indicates:  No Known Allergies    Actual Body Weight:   63kg    Renal Function:   Estimated Creatinine Clearance: 72.6 mL/min (based on SCr of 0.7 mg/dL).,     Dialysis Method (if applicable):  N/A    CBC (last 72 hours):  Recent Labs   Lab Result Units 09/01/22  0529 09/02/22  0557 09/03/22  0724   WBC K/uL 5.22 4.54 6.53   Hemoglobin g/dL 12.3* 11.6* 10.9*   Hematocrit % 37.6* 35.4* 33.5*   Platelets K/uL 251 255 217   Gran % % 76.0* 63.7 55.9   Lymph % % 18.6 28.6 26.0   Mono % % 4.0 6.2 17.0*   Eosinophil % % 0.6 0.9 0.6   Basophil % % 0.2 0.2 0.2   Differential Method  Automated Automated Automated       Metabolic Panel (last 72 hours):  Recent Labs   Lab Result Units 09/01/22  0529 09/02/22  0556 09/03/22  0622   Sodium mmol/L 141 139 138   Potassium mmol/L 4.0 3.9 4.1   Chloride mmol/L 106 106 107   CO2 mmol/L 21* 22* 18*   Glucose mg/dL 129* 86 88   BUN mg/dL 28*  16 14   Creatinine mg/dL 0.8 0.6 0.7   Albumin g/dL 3.1* 2.7* 2.6*   Total Bilirubin mg/dL 0.8 1.0 0.9   Alkaline Phosphatase U/L 112 89 88   AST U/L 30 22 25   ALT U/L 27 25 25   Magnesium mg/dL 1.8 1.5* 1.8   Phosphorus mg/dL 2.2* 2.3* 2.9       Vancomycin Administrations:  vancomycin given in the last 96 hours                     vancomycin 1.5 g in dextrose 5 % 250 mL IVPB (ready to mix) (mg) 1,500 mg New Bag 09/03/22 2240     1,500 mg New Bag 09/02/22 2213    vancomycin 1.5 g in dextrose 5 % 250 mL IVPB (ready to mix) (mg) 1,500 mg New Bag 09/01/22 1409                    Microbiologic Results:  Microbiology Results (last 7 days)       Procedure Component Value Units Date/Time    Blood culture x two cultures. Draw prior to antibiotics. [[id number]]  (Abnormal) Collected: 08/31/22 2255    Order Status: Completed Specimen: Blood from Peripheral, Upper Arm, Right Updated: 09/03/22 1301     Blood Culture, Routine Gram stain aer bottle: Gram positive cocci in clusters resembling Staph      Results called to and read back by: Ashley Nogueira RN 09/02/2022      14:56      COAGULASE-NEGATIVE STAPHYLOCOCCUS SPECIES  Organism is a probable contaminant      Narrative:      Aerobic and anaerobic    Blood culture x two cultures. Draw prior to antibiotics. [[id number]] Collected: 09/01/22 0028    Order Status: Completed Specimen: Blood from Peripheral, Wrist, Left Updated: 09/03/22 1212     Blood Culture, Routine No Growth to date      No Growth to date      No Growth to date    Narrative:      Aerobic and anaerobic    Blood culture [[id number]] Collected: 09/02/22 2008    Order Status: Completed Specimen: Blood Updated: 09/03/22 0915     Blood Culture, Routine No Growth to date    Narrative:      Collection has been rescheduled by AURELIO at 09/02/2022 19:09 Reason:   Unable to collect/ patient keeps moving notified Nurse   Collection has been rescheduled by [email or web address] at 09/02/2022 19:09 Reason:   Unable to collect/ patient keeps  moving notified Nurse     Blood culture [[id number]] Collected: 09/02/22 2008    Order Status: Completed Specimen: Blood Updated: 09/03/22 0915     Blood Culture, Routine No Growth to date    Narrative:      Collection has been rescheduled by [email or web address] at 09/02/2022 19:09 Reason:   Unable to collect/ patient keeps moving notified Nurse   Collection has been rescheduled by [email or web address] at 09/02/2022 19:09 Reason:   Unable to collect/ patient keeps moving notified Nurse     MRSA/SA Rapid ID by PCR from Blood culture [[id number]] Collected: 09/02/22 1451    Order Status: Completed Updated: 09/02/22 1606     Staph aureus ID by PCR Negative     MRSA ID by PCR Negative    Narrative:      Aerobic and anaerobic

## 2022-09-04 NOTE — DISCHARGE SUMMARY
[facility] Medicine  Discharge Summary      Patient Name: Adalberto Medina  MRN: [id number]  Patient Class: IP- Inpatient  Admission Date: 8/31/2022  Hospital Length of Stay: 2 days  Discharge Date and Time:  09/04/2022 7:26 PM  Attending Physician: López Mota III, MD   Discharging Provider: Amish Burroughs MD  Primary Care Provider: Primary Doctor No    Chief Complaint   Patient presents with    Weakness     Pt arrives via EMS from [facility], per [facility] staff pt has been more lethargic and not eating x1 week, Also reports pt is more confused than normal, staff reports pt baseline is Aox4. Pt is hypotensive upon arrival.      HPI:   82-year-old male with a past medical history of legal blindness, type 2 diabetes, essential hypertension, sacral wounds presents to the ED for altered mental status and shortness of breath. Patient was brought to the ED from [facility] due to change in mentation and activity.  Nursing home staff reports patient has not been eating much for the past week, and has become increasingly withdrawn and unresponsive. Patient's baseline is reported as Aox4 and fully conversant. Patient has been seen recently for a similar presentation and was treated for an UTI. Patient was hypoxic and minimally responsive on arrival to ED.     In ED patient was started on non-re breather mask O2 and given IV fluid bolus. Lab results did not indicate any acute infection, and CT head showed no signs of acute infarct or hemorrhage. Patients mental stats improved during his stay in the ED. A CTA chest was ordered and showed the presence of 2 pulmonary emboli. Patient is now satting 100% on room and responds to questions. Patient to be admitted to [facility] for management of resolving PE and AMS.      * No surgery found *      Hospital Course:   Started on heparin drip and Vancomycin. SLP evaluation for intolerance of PO intake. Recommended thin liquids and assistance with feeding Code status changed to  "DNR. Blood Cxs inially pos for Staph. Staph/MRSA PCR negative (determined to be contaminant). Repeat blood Cx NG2D, Abx eventually discontinued. Heparin drip discontinued due to elevated PTT 9/2. He was eventually transitioned to Eliquis 10 BID with a plan to continue for 7 days, and then 5 mg BID thereafter. During the admission, patient noted to be lethargic, endorsed feeling "sad" and "tired", started on Lexapro on 9/3. Palliative care discussed with family. He maintained this decreased mental functioning with no obvious etiology. The rest of his chronic conditions were well managed during the admission. On the day of discharge was hemodynamically stable. He was sent back to SNF to continue home meds as well as to continue Eliquis and Lexapro. In SNF, patient should continue pureed diet thin and boost for added supplements. He should be assisted with feeding, crush meds, Must be alert and awake and have single sips of liquids. PT/OT/SLP to evaluate and treat. Rest of orders per hostrans. Patient's daughter agreeable to discharge plan, expressed understanding, all questions answered, return precautions were discussed.     Physical Exam  Constitutional:       General: He is not in acute distress.     Comments: Somnolent   HENT:      Head: Normocephalic.      Right Ear: External ear normal.      Left Ear: External ear normal.      Nose: Nose normal.      Mouth/Throat:      Mouth: Mucous membranes are dry.   Eyes:      Pupils: Pupils are equal, round, and reactive to light.      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      Pulses: Normal pulses.   Pulmonary:      Effort: Pulmonary effort is normal.   Abdominal:      General: Bowel sounds are normal. There is no distension.      Palpations: Abdomen is soft.      Tenderness: There is no abdominal tenderness.   Musculoskeletal:      Cervical back: Normal range of motion.      Right lower leg: No edema.      Left lower leg: No " edema.   Skin:     Capillary Refill: Capillary refill takes less than 2 seconds.   Neurological:      Mental Status: He is disoriented.      Comments: Aox1 person.  Requiring stimulation to rouse. Unable respond to questions and commands this AM.     Goals of Care Treatment Preferences:  Code Status: DNR      Consults:   Consults (From admission, onward)          Status Ordering Provider     Pharmacy to dose Vancomycin consult  Once        Provider:  (Not yet assigned)   See Hyperspace for full Linked Orders Report.    Acknowledged AJ ROCHA     IP consult to case management  Once        Provider:  (Not yet assigned)    Acknowledged DAREK LIRA III     Inpatient consult to Midline team  Once        Provider:  (Not yet assigned)    Acknowledged NOEMY CAROLINA     [facility]  Once        Provider:  (Not yet assigned)    Completed BIANCA COHN     Pharmacy to dose Vancomycin consult  Once        Provider:  (Not yet assigned)   See Hyperspace for full Linked Orders Report.    Acknowledged JON OLMSTEAD              Final Active Diagnoses:    Diagnosis Date Noted POA    PRINCIPAL PROBLEM:  Multiple subsegmental pulmonary emboli without acute cor pulmonale [I26.94] 09/01/2022 Yes    Depression [F32.A] 09/03/2022 Yes    Disorientation [R41.0] 09/01/2022 Yes    Essential hypertension [I10] 08/26/2021 Yes    Diabetes mellitus due to underlying condition with hyperosmolarity without coma, without long-term current use of insulin [E08.00] 08/26/2021 Yes      Problems Resolved During this Admission:       Discharged Condition: good    Disposition: Skilled Nursing Facility    Follow Up:    Patient Instructions:      Diet diabetic     Notify your health care provider if you experience any of the following:  temperature >100.4     Notify your health care provider if you experience any of the following:  persistent nausea and vomiting or diarrhea     Notify your health care provider if  you experience any of the following:  severe uncontrolled pain     Notify your health care provider if you experience any of the following:  difficulty breathing or increased cough     Notify your health care provider if you experience any of the following:  severe persistent headache     Notify your health care provider if you experience any of the following:  persistent dizziness, light-headedness, or visual disturbances     Notify your health care provider if you experience any of the following:  increased confusion or weakness     Activity as tolerated       Significant Diagnostic Studies:    Recent Labs   Lab 08/31/22 2252 09/01/22  0529 09/02/22  0557 09/03/22  0724   WBC 5.51 5.22 4.54 6.53   HGB 11.4* 12.3* 11.6* 10.9*   HCT 35.2* 37.6* 35.4* 33.5*   MCV 85 83 83 84   RBC 4.16* 4.53* 4.27* 3.97*   MCH 27.4 27.2 27.2 27.5   MCHC 32.4 32.7 32.8 32.5   RDW 14.0 14.0 13.8 13.8    251 255 217   MPV 9.0* 9.0* 9.1* 9.1*   GRAN 74.6*  4.1 76.0*  4.0 63.7  2.9 55.9  3.7   LYMPH 18.5  1.0 18.6  1.0 28.6  1.3 26.0  1.7   MONO 5.8  0.3 4.0  0.2* 6.2  0.3 17.0*  1.1*   EOSINOPHIL 0.5 0.6 0.9 0.6   BASOPHIL 0.2 0.2 0.2 0.2       Recent Labs   Lab 08/31/22 2252 09/01/22 0529 09/02/22  0556 09/03/22  0622    141 139 138   K 4.1 4.0 3.9 4.1    106 106 107   CO2 19* 21* 22* 18*   BUN 30* 28* 16 14   CREATININE 0.8 0.8 0.6 0.7   * 129* 86 88   CALCIUM 8.9 9.1 8.6* 8.4*   PROT 6.6 7.1 6.1 6.0   ALBUMIN 2.9* 3.1* 2.7* 2.6*   ALKPHOS 101 112 89 88   BILITOT 0.9 0.8 1.0 0.9   ALT 28 27 25 25   AST 28 30 22 25   ANIONGAP 14 14 11 13   MG 1.7 1.8 1.5* 1.8   PHOS  --  2.2* 2.3* 2.9       Recent Labs   Lab 08/31/22  2200   COLORU Yellow   APPEARANCEUA Clear   PHUR 5.0   SPECGRAV 1.025   PROTEINUA Trace*   GLUCUA Negative   KETONESU Trace*   BILIRUBINUA Negative   OCCULTUA Negative   UROBILINOGEN 2.0-3.0*   NITRITE Negative   LEUKOCYTESUR Negative       Recent Labs   Lab 08/31/22  2200   PH  7.411   PCO2 36.1   PO2 387*   HCO3 22.9*   POCSATURATED 100   BE -2       Recent Labs   Lab 08/31/22 2252   TROPONINI 0.007       Recent Labs   Lab 08/31/22  2252 09/01/22  0529 09/01/22  1237 09/01/22  2231 09/01/22  2343 09/02/22  0557 09/02/22  1401   INR 1.1 1.1  --   --   --   --   --    APTT <21.0 25.4 116.2* 148.6* 131.8* 73.0* 29.7       No results found for: LABA1C, HGBA1C    Recent Labs   Lab 09/02/22  0557   TSH 1.071       Microbiology Results (last 7 days)       Procedure Component Value Units Date/Time    Blood culture x two cultures. Draw prior to antibiotics. [[id number]] Collected: 09/01/22 0028    Order Status: Completed Specimen: Blood from Peripheral, Wrist, Left Updated: 09/04/22 1212     Blood Culture, Routine No Growth to date      No Growth to date      No Growth to date      No Growth to date    Narrative:      Aerobic and anaerobic    Blood culture x two cultures. Draw prior to antibiotics. [[id number]]  (Abnormal) Collected: 08/31/22 2255    Order Status: Completed Specimen: Blood from Peripheral, Upper Arm, Right Updated: 09/04/22 0902     Blood Culture, Routine Gram stain aer bottle: Gram positive cocci in clusters resembling Staph      Results called to and read back by: Ashley Nogueira RN 09/02/2022      14:56      COAGULASE-NEGATIVE STAPHYLOCOCCUS SPECIES  Organism is a probable contaminant      Narrative:      Aerobic and anaerobic    Blood culture [[id number]] Collected: 09/02/22 2008    Order Status: Completed Specimen: Blood Updated: 09/04/22 0613     Blood Culture, Routine No Growth to date      No Growth to date    Narrative:      Collection has been rescheduled by AURELIO at 09/02/2022 19:09 Reason:   Unable to collect/ patient keeps moving notified Nurse   Collection has been rescheduled by [email or web address] at 09/02/2022 19:09 Reason:   Unable to collect/ patient keeps moving notified Nurse     Blood culture [[id number]] Collected: 09/02/22 2008    Order Status: Completed Specimen: Blood  Updated: 09/04/22 0613     Blood Culture, Routine No Growth to date      No Growth to date    Narrative:      Collection has been rescheduled by [email or web address] at 09/02/2022 19:09 Reason:   Unable to collect/ patient keeps moving notified Nurse   Collection has been rescheduled by [email or web address] at 09/02/2022 19:09 Reason:   Unable to collect/ patient keeps moving notified Nurse     MRSA/SA Rapid ID by PCR from Blood culture [[id number]] Collected: 09/02/22 1451    Order Status: Completed Updated: 09/02/22 1606     Staph aureus ID by PCR Negative     MRSA ID by PCR Negative    Narrative:      Aerobic and anaerobic            CT Head Without Contrast    Result Date: 8/31/2022  EXAMINATION: CT HEAD WITHOUT CONTRAST CLINICAL HISTORY: Mental status change, unknown cause; TECHNIQUE: Low dose axial images were obtained through the head.  Coronal and sagittal reformations were also performed. Contrast was not administered. COMPARISON: 08/22/2022 CT brain FINDINGS: Involutional changes manifested by prominence of the cortical sulcal markings basilar cisterns and ventricular system are again noted.  Extensive low density in the deep and subcortical white matter compatible with chronic small vessel changes are also again noted. There is no new loss of gray-white matter junction differentiation, mass, mass effect or pathologic fluid collection. Minimal mucosal thickening and/or fluid is noted in the left sphenoid sinuses.     Stable CT of the brain with no interval developing hemorrhage, mass or infarction. Mild sphenoid sinus disease.  Stable. Electronically signed by: Ulises Short Date:    08/31/2022 Time:    23:37    CT Head Without Contrast    Result Date: 8/22/2022  EXAMINATION: CT HEAD WITHOUT CONTRAST CLINICAL HISTORY: Mental status change, unknown cause; TECHNIQUE: Low dose axial images were obtained through the head.  Coronal and sagittal reformations were also performed. Contrast was not administered. COMPARISON: CT dated 04/21/2022.  FINDINGS: The subcutaneous tissues are unremarkable.  The bony calvarium is intact.  There is mucosal thickening within the sphenoid sinuses.  The mastoid air cells are clear.  There are postoperative changes in the globes. The craniocervical junction is intact.  The midline structures are unremarkable.  There are no extra-axial fluid collections.  There is no evidence of intracranial hemorrhage.  The ventricles and sulci are prominent, consistent cerebral volume loss.  There are hypodensities within the periventricular and subcortical white matter.  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  There is no dense vessel sign.  There is no evidence of mass effect.     No acute intracranial process.  Additional evaluation, as clinically warranted. Changes of chronic small vessel ischemic disease and cerebral volume loss. Electronically signed by: Atul Box MD Date:    08/22/2022 Time:    16:14    CTA Chest Non-Coronary (PE Study)    Result Date: 9/1/2022  EXAMINATION: CTA CHEST NON CORONARY CLINICAL HISTORY: Pulmonary embolism (PE) suspected, high prob; TECHNIQUE: Low dose axial images, sagittal and coronal reformations were obtained from the thoracic inlet to the lung bases following the IV administration of 100 mL of Omnipaque 350.  Contrast timing was optimized to evaluate the pulmonary arteries.  MIP images were performed. COMPARISON: Chest radiograph September 1, 2022 FINDINGS: There are small pulmonary artery filling defects noted on the left, at the left lung base anteriorly as well as posteriorly, as seen on axial images 281 through 307, series 2 anteriorly, and axial images 316 through 322 posteriorly at the left base.  These are consistent with pulmonary emboli.  There is no large central saddle type pulmonary embolus. The thoracic aorta demonstrates appropriate opacification.  Atherosclerotic change noted.  There is elevation of the right  hemidiaphragm.  The lungs demonstrate prominent chronic appearing change, there are bandlike areas of scarring, there are chronic interstitial changes.  There are bronchiectatic changes noted.  There are some bronchi that demonstrate opacity, these may relate to chronic changes however may relate to secretions or mucous plugging.  There are areas of opacity extending from the right suprahilar region superiorly, and to a lesser extent from the left hilum, these are thought likely sequelae of chronic change, however follow-up is recommended. There is no evidence for pleural effusion and there is no evidence for pneumothorax. Limited imaging of the upper abdomen demonstrates no evidence for acute upper abdominal process. The osseous structures demonstrate chronic change with diminished mineralization and degenerative change.     Findings consistent with small vessel acute pulmonary emboli on the left, as discussed above. Chronic appearing lung changes as discussed above, additional areas of opacity may relate to chronic change however follow-up is recommended. The findings were reported to Dr. Box in the ER at the time of dictation. This report was flagged in Epic as abnormal. Electronically signed by: Twin Verma Date:    09/01/2022 Time:    03:07    X-Ray Chest AP Portable    Result Date: 9/1/2022  EXAMINATION: XR CHEST AP PORTABLE CLINICAL HISTORY: Sepsis; TECHNIQUE: Single frontal view of the chest was performed. COMPARISON: Chest radiograph August 22, 2022 FINDINGS: Single portable chest view is submitted.  There is elevation of the right hemidiaphragm and there is rotation.  When accounting for these factors the cardiomediastinal silhouette appears stable. Chronic appearing lung changes are noted, the overall appearance is similar to the prior examination.  There appear to be mild atelectatic changes superimposed at the right lung base.  There is no dense consolidation, significant pleural effusion or  pneumothorax.  The visualized osseous structures demonstrate chronic change.     Chronic and atelectatic changes are noted appearing similar to the prior examination without evidence for superimposed acute intrathoracic process. Electronically signed by: Twin Verma Date:    09/01/2022 Time:    00:54    X-Ray Chest AP Portable    Result Date: 8/22/2022  EXAMINATION: XR CHEST AP PORTABLE CLINICAL HISTORY: Altered mental status, unspecified TECHNIQUE: Single frontal view of the chest was performed. COMPARISON: 04/20/2022. FINDINGS: Monitoring EKG leads are present.  The trachea is unremarkable.  The cardiomediastinal silhouette is unchanged.  There is no evidence of free air beneath the hemidiaphragms.  There are no pleural effusions.  There is no evidence of a pneumothorax.  There is no evidence of pneumomediastinum.  There are chronic interstitial opacities.  There is also changes of bilateral pulmonary scarring.  No focal consolidation is present.  There are degenerative changes in the osseous structures.     Stable examination.  No acute process. Electronically signed by: Atul Box MD Date:    08/22/2022 Time:    17:16    US Lower Extremity Veins Bilateral    Result Date: 9/1/2022  EXAMINATION: US LOWER EXTREMITY VEINS BILATERAL CLINICAL HISTORY: bilateral PEs; AMS; TECHNIQUE: Duplex and color flow Doppler and dynamic compression was performed of the bilateral lower extremity veins was performed. COMPARISON: None FINDINGS: This examination is limited due to difficulties in patient positioning. Right thigh veins: The common femoral, femoral, popliteal, upper greater saphenous, and deep femoral veins are patent and free of thrombus. The veins are normally compressible and have normal phasic flow and augmentation response. Right calf veins: The visualized calf veins are patent. Left thigh veins: The common femoral, femoral, popliteal, upper greater saphenous, and deep femoral veins are patent and free of  thrombus. The veins are normally compressible and have normal phasic flow and augmentation response. Left calf veins: Probable deep venous thrombosis at the origin of the left peroneal vein. Miscellaneous: None     Limited exam due to difficulties in patient positioning.  Probable deep venous thrombosis at the origin of the left peroneal vein. This finding was discussed with Dr. Tim Elliott on 09/01/2022 at 10:45. Electronically signed by: Opal Cormier Date:    09/01/2022 Time:    11:12    Echo    Result Date: 9/1/2022  · The left ventricle is normal in size with normal systolic function. · The estimated ejection fraction is 65%. · Normal left ventricular diastolic function. · Normal right ventricular size with normal right ventricular systolic function. · Normal central venous pressure (3 mmHg).      Pending Diagnostic Studies:       Procedure Component Value Units Date/Time    CBC auto differential [[id number]]     Order Status: Sent Lab Status: No result     Specimen: Blood     Comprehensive Metabolic Panel (CMP) [[id number]]     Order Status: Sent Lab Status: No result     Specimen: Blood     Magnesium [[id number]]     Order Status: Sent Lab Status: No result     Specimen: Blood     Phosphorus [[id number]]     Order Status: Sent Lab Status: No result     Specimen: Blood     Vitamin B1 [[id number]] Collected: 09/02/22 0557    Order Status: Sent Lab Status: In process Updated: 09/02/22 1254    Specimen: Blood            Medications:  Reconciled Home Medications:      Medication List        START taking these medications      * apixaban 5 mg Tab  Commonly known as: ELIQUIS  Take 2 tablets (10 mg total) by mouth 2 (two) times daily. for 6 days     * apixaban 5 mg Tab  Commonly known as: ELIQUIS  Take 1 tablet (5 mg total) by mouth 2 (two) times daily.  Start taking on: September 9, 2022     EScitalopram oxalate 5 MG Tab  Commonly known as: LEXAPRO  Take 1 tablet (5 mg total) by mouth once daily.  Start taking on:  September 5, 2022           * This list has 2 medication(s) that are the same as other medications prescribed for you. Read the directions carefully, and ask your doctor or other care provider to review them with you.                CONTINUE taking these medications      acetaminophen 500 MG tablet  Commonly known as: TYLENOL  Take 500 mg by mouth 2 (two) times a day.     ALPHAGAN P 0.1 % Drop  Generic drug: brimonidine 0.1%  Place 1 drop into both eyes 2 (two) times a day.     atorvastatin 20 MG tablet  Commonly known as: LIPITOR  Take 20 mg by mouth every evening.     dorzolamide 2 % ophthalmic solution  Commonly known as: TRUSOPT  Place 1 drop into both eyes 2 (two) times a day.     finasteride 5 mg tablet  Commonly known as: PROSCAR  Take 5 mg by mouth once daily.     fluticasone propionate 50 mcg/actuation nasal spray  Commonly known as: FLONASE  1 spray by Each Nostril route once daily.     latanoprost 0.005 % ophthalmic solution  Place into both eyes every evening.     losartan 100 MG tablet  Commonly known as: COZAAR  Take 100 mg by mouth once daily.     megestroL 400 mg/10 mL (40 mg/mL) Susp  Commonly known as: MEGACE  Take 800 mg by mouth Daily.     multivitamin per tablet  Commonly known as: THERAGRAN  Take 1 tablet by mouth once daily.     prednisoLONE acetate 1 % Drps  Commonly known as: PRED FORTE  Place 1 drop into both eyes every evening.     * risperiDONE 1 MG tablet  Commonly known as: RISPERDAL  Take 1 mg by mouth once daily.     * risperiDONE 2 MG tablet  Commonly known as: RISPERDAL  Take 2 mg by mouth nightly.     tamsulosin 0.4 mg Cap  Commonly known as: FLOMAX  Take 0.4 mg by mouth every evening.     timolol maleate 0.5% 0.5 % Drop  Commonly known as: TIMOPTIC  Place 1 drop into both eyes 2 (two) times daily.     zinc sulfate 50 mg zinc (220 mg) capsule  Commonly known as: ZINCATE  Take 220 mg by mouth once daily.           * This list has 2 medication(s) that are the same as other  medications prescribed for you. Read the directions carefully, and ask your doctor or other care provider to review them with you.                  Indwelling Lines/Drains at time of discharge:   Lines/Drains/Airways       None                   Time spent on the discharge of patient: >30 minutes         Amish Burroughs MD  Department of Hospital Medicine  [facility]

## 2022-09-04 NOTE — PLAN OF CARE
SINDI sent updated facility transfer orders via careport to [facility].SW also placed call to facility [phone] to confirm acceptance but SW received no answer.    12:30PM: SW placed call to facility again and received no answer.     SW received instruction pt accepted into facility. Staff to call report to main phone number [phone] Nurse Rodriguez.     09/04/22 1128   Post-Acute Status   Post-Acute Authorization Placement   Post-Acute Placement Status Set-up Complete/Auth obtained   Discharge Delays None known at this time   Discharge Plan   Discharge Plan A Return to nursing home     No future appointments. Pt to be seen by facility physician.       LIZA Reynoldsner Case Management  [phone]

## 2022-09-06 LAB — BACTERIA BLD CULT: NORMAL

## 2022-09-07 LAB — VIT B1 BLD-MCNC: 39 UG/L (ref 38–122)

## 2022-09-08 LAB
BACTERIA BLD CULT: NORMAL
BACTERIA BLD CULT: NORMAL

## 2022-09-20 ENCOUNTER — TELEPHONE (OUTPATIENT)
Dept: GASTROENTEROLOGY | Facility: CLINIC | Age: 82
End: 2022-09-20
Payer: MEDICARE

## 2022-09-20 NOTE — TELEPHONE ENCOUNTER
Return call and spoke with Joya Hinson the stretcher was going to be about 45 min late. Rescheduled appointment for another date.   Reminder later will be mailed to home.

## 2022-09-20 NOTE — TELEPHONE ENCOUNTER
----- Message from Maryana Cortes sent at 9/20/2022 10:58 AM CDT -----  Contact: @[phone]  Caller ( nadja) [facility] states that they have to wait for the stretcher and they will make in by 45 minutes, caller wanted to see if that will be ok. Please call to discuss further.

## 2022-09-27 ENCOUNTER — HOSPITAL ENCOUNTER (INPATIENT)
Facility: HOSPITAL | Age: 82
LOS: 5 days | Discharge: HOME | DRG: 640 | End: 2022-10-03
Attending: EMERGENCY MEDICINE | Admitting: INTERNAL MEDICINE
Payer: MEDICARE

## 2022-09-27 DIAGNOSIS — R79.89 ELEVATED TROPONIN: ICD-10-CM

## 2022-09-27 DIAGNOSIS — F32.A DEPRESSION, UNSPECIFIED DEPRESSION TYPE: ICD-10-CM

## 2022-09-27 DIAGNOSIS — R62.7 FAILURE TO THRIVE IN ADULT: ICD-10-CM

## 2022-09-27 DIAGNOSIS — E87.0 HYPERNATREMIA: ICD-10-CM

## 2022-09-27 DIAGNOSIS — E78.2 MIXED HYPERLIPIDEMIA: ICD-10-CM

## 2022-09-27 DIAGNOSIS — G93.40 ACUTE ENCEPHALOPATHY: Primary | ICD-10-CM

## 2022-09-27 DIAGNOSIS — H61.21 IMPACTED CERUMEN OF RIGHT EAR: ICD-10-CM

## 2022-09-27 DIAGNOSIS — E08.00 DIABETES MELLITUS DUE TO UNDERLYING CONDITION WITH HYPEROSMOLARITY WITHOUT COMA, WITHOUT LONG-TERM CURRENT USE OF INSULIN: ICD-10-CM

## 2022-09-27 DIAGNOSIS — R41.82 ALTERED MENTAL STATUS: ICD-10-CM

## 2022-09-27 DIAGNOSIS — Z51.5 PALLIATIVE CARE ENCOUNTER: ICD-10-CM

## 2022-09-27 DIAGNOSIS — S31.000D WOUND OF SACRAL REGION, SUBSEQUENT ENCOUNTER: ICD-10-CM

## 2022-09-27 PROBLEM — N39.43 BENIGN PROSTATIC HYPERPLASIA WITH POST-VOID DRIBBLING: Status: ACTIVE | Noted: 2021-01-28

## 2022-09-27 PROBLEM — J30.2 SEASONAL ALLERGIES: Status: ACTIVE | Noted: 2021-09-23

## 2022-09-27 PROBLEM — Z85.46 HISTORY OF PROSTATE CANCER: Status: ACTIVE | Noted: 2019-07-22

## 2022-09-27 PROBLEM — F29 PSYCHOSIS: Status: ACTIVE | Noted: 2021-08-26

## 2022-09-27 PROBLEM — N40.1 BENIGN PROSTATIC HYPERPLASIA WITH POST-VOID DRIBBLING: Status: ACTIVE | Noted: 2021-01-28

## 2022-09-27 LAB
ALBUMIN SERPL BCP-MCNC: 2.5 G/DL (ref 3.5–5.2)
ALP SERPL-CCNC: 102 U/L (ref 55–135)
ALT SERPL W/O P-5'-P-CCNC: 16 U/L (ref 10–44)
ANION GAP SERPL CALC-SCNC: 11 MMOL/L (ref 8–16)
AST SERPL-CCNC: 25 U/L (ref 10–40)
BACTERIA #/AREA URNS HPF: ABNORMAL /HPF
BILIRUB SERPL-MCNC: 0.7 MG/DL (ref 0.1–1)
BILIRUB UR QL STRIP: NEGATIVE
BNP SERPL-MCNC: 44 PG/ML (ref 0–99)
BUN SERPL-MCNC: 30 MG/DL (ref 8–23)
CALCIUM SERPL-MCNC: 9.1 MG/DL (ref 8.7–10.5)
CHLORIDE SERPL-SCNC: 121 MMOL/L (ref 95–110)
CLARITY UR: ABNORMAL
CO2 SERPL-SCNC: 23 MMOL/L (ref 23–29)
COLOR UR: YELLOW
CREAT SERPL-MCNC: 1 MG/DL (ref 0.5–1.4)
CTP QC/QA: YES
ERYTHROCYTE [DISTWIDTH] IN BLOOD BY AUTOMATED COUNT: 16.1 % (ref 11.5–14.5)
EST. GFR  (NO RACE VARIABLE): >60 ML/MIN/1.73 M^2
GLUCOSE SERPL-MCNC: 101 MG/DL (ref 70–110)
GLUCOSE UR QL STRIP: NEGATIVE
HCT VFR BLD AUTO: 37.5 % (ref 40–54)
HGB BLD-MCNC: 11.4 G/DL (ref 14–18)
HGB UR QL STRIP: NEGATIVE
HYALINE CASTS #/AREA URNS LPF: 4 /LPF
KETONES UR QL STRIP: ABNORMAL
LEUKOCYTE ESTERASE UR QL STRIP: ABNORMAL
MAGNESIUM SERPL-MCNC: 1.8 MG/DL (ref 1.6–2.6)
MCH RBC QN AUTO: 27.5 PG (ref 27–31)
MCHC RBC AUTO-ENTMCNC: 30.4 G/DL (ref 32–36)
MCV RBC AUTO: 91 FL (ref 82–98)
MICROSCOPIC COMMENT: ABNORMAL
NITRITE UR QL STRIP: NEGATIVE
PH UR STRIP: 6 [PH] (ref 5–8)
PLATELET # BLD AUTO: 261 K/UL (ref 150–450)
PMV BLD AUTO: 10.1 FL (ref 9.2–12.9)
POTASSIUM SERPL-SCNC: 4.4 MMOL/L (ref 3.5–5.1)
PROCALCITONIN SERPL IA-MCNC: 0.09 NG/ML
PROT SERPL-MCNC: 6.6 G/DL (ref 6–8.4)
PROT UR QL STRIP: ABNORMAL
RBC # BLD AUTO: 4.14 M/UL (ref 4.6–6.2)
RBC #/AREA URNS HPF: 4 /HPF (ref 0–4)
SARS-COV-2 RDRP RESP QL NAA+PROBE: NEGATIVE
SODIUM SERPL-SCNC: 155 MMOL/L (ref 136–145)
SP GR UR STRIP: 1.02 (ref 1–1.03)
SQUAMOUS #/AREA URNS HPF: 1 /HPF
TROPONIN I SERPL DL<=0.01 NG/ML-MCNC: 0.04 NG/ML (ref 0–0.03)
URN SPEC COLLECT METH UR: ABNORMAL
UROBILINOGEN UR STRIP-ACNC: ABNORMAL EU/DL
WBC # BLD AUTO: 7.52 K/UL (ref 3.9–12.7)
WBC #/AREA URNS HPF: 4 /HPF (ref 0–5)

## 2022-09-27 PROCEDURE — 80053 COMPREHEN METABOLIC PANEL: CPT | Performed by: EMERGENCY MEDICINE

## 2022-09-27 PROCEDURE — U0002 COVID-19 LAB TEST NON-CDC: HCPCS | Performed by: EMERGENCY MEDICINE

## 2022-09-27 PROCEDURE — 87040 BLOOD CULTURE FOR BACTERIA: CPT | Performed by: EMERGENCY MEDICINE

## 2022-09-27 PROCEDURE — 96365 THER/PROPH/DIAG IV INF INIT: CPT

## 2022-09-27 PROCEDURE — 83930 ASSAY OF BLOOD OSMOLALITY: CPT | Performed by: EMERGENCY MEDICINE

## 2022-09-27 PROCEDURE — 63600175 PHARM REV CODE 636 W HCPCS: Performed by: EMERGENCY MEDICINE

## 2022-09-27 PROCEDURE — 83735 ASSAY OF MAGNESIUM: CPT | Performed by: EMERGENCY MEDICINE

## 2022-09-27 PROCEDURE — 84145 PROCALCITONIN (PCT): CPT | Performed by: EMERGENCY MEDICINE

## 2022-09-27 PROCEDURE — 93010 EKG 12-LEAD: ICD-10-PCS | Mod: ,,, | Performed by: INTERNAL MEDICINE

## 2022-09-27 PROCEDURE — G0378 HOSPITAL OBSERVATION PER HR: HCPCS

## 2022-09-27 PROCEDURE — 93010 ELECTROCARDIOGRAM REPORT: CPT | Mod: ,,, | Performed by: INTERNAL MEDICINE

## 2022-09-27 PROCEDURE — 99291 CRITICAL CARE FIRST HOUR: CPT | Mod: 25

## 2022-09-27 PROCEDURE — 96361 HYDRATE IV INFUSION ADD-ON: CPT

## 2022-09-27 PROCEDURE — 25000003 PHARM REV CODE 250: Performed by: EMERGENCY MEDICINE

## 2022-09-27 PROCEDURE — 81000 URINALYSIS NONAUTO W/SCOPE: CPT | Performed by: EMERGENCY MEDICINE

## 2022-09-27 PROCEDURE — 85027 COMPLETE CBC AUTOMATED: CPT | Performed by: EMERGENCY MEDICINE

## 2022-09-27 PROCEDURE — 84484 ASSAY OF TROPONIN QUANT: CPT | Performed by: EMERGENCY MEDICINE

## 2022-09-27 PROCEDURE — 83880 ASSAY OF NATRIURETIC PEPTIDE: CPT | Performed by: EMERGENCY MEDICINE

## 2022-09-27 PROCEDURE — 93005 ELECTROCARDIOGRAM TRACING: CPT

## 2022-09-27 RX ORDER — DORZOLAMIDE HCL 20 MG/ML
1 SOLUTION/ DROPS OPHTHALMIC 2 TIMES DAILY
Status: DISCONTINUED | OUTPATIENT
Start: 2022-09-28 | End: 2022-10-03 | Stop reason: HOSPADM

## 2022-09-27 RX ORDER — PREDNISOLONE ACETATE 10 MG/ML
1 SUSPENSION/ DROPS OPHTHALMIC NIGHTLY
Status: DISCONTINUED | OUTPATIENT
Start: 2022-09-28 | End: 2022-10-03 | Stop reason: HOSPADM

## 2022-09-27 RX ORDER — FINASTERIDE 5 MG/1
5 TABLET, FILM COATED ORAL DAILY
Status: DISCONTINUED | OUTPATIENT
Start: 2022-09-28 | End: 2022-10-03 | Stop reason: HOSPADM

## 2022-09-27 RX ORDER — TIMOLOL MALEATE 5 MG/ML
1 SOLUTION/ DROPS OPHTHALMIC 2 TIMES DAILY
Status: DISCONTINUED | OUTPATIENT
Start: 2022-09-28 | End: 2022-10-03 | Stop reason: HOSPADM

## 2022-09-27 RX ORDER — ESCITALOPRAM OXALATE 5 MG/1
5 TABLET ORAL DAILY
Status: DISCONTINUED | OUTPATIENT
Start: 2022-09-28 | End: 2022-10-02

## 2022-09-27 RX ORDER — LOSARTAN POTASSIUM 50 MG/1
100 TABLET ORAL DAILY
Status: DISCONTINUED | OUTPATIENT
Start: 2022-09-28 | End: 2022-09-28

## 2022-09-27 RX ORDER — SODIUM CHLORIDE 9 MG/ML
1000 INJECTION, SOLUTION INTRAVENOUS
Status: COMPLETED | OUTPATIENT
Start: 2022-09-27 | End: 2022-09-27

## 2022-09-27 RX ORDER — SODIUM CHLORIDE 0.9 % (FLUSH) 0.9 %
10 SYRINGE (ML) INJECTION
Status: DISCONTINUED | OUTPATIENT
Start: 2022-09-28 | End: 2022-10-03 | Stop reason: HOSPADM

## 2022-09-27 RX ORDER — ATORVASTATIN CALCIUM 20 MG/1
20 TABLET, FILM COATED ORAL NIGHTLY
Status: DISCONTINUED | OUTPATIENT
Start: 2022-09-28 | End: 2022-10-02

## 2022-09-27 RX ORDER — TAMSULOSIN HYDROCHLORIDE 0.4 MG/1
0.4 CAPSULE ORAL NIGHTLY
Status: DISCONTINUED | OUTPATIENT
Start: 2022-09-28 | End: 2022-10-03 | Stop reason: HOSPADM

## 2022-09-27 RX ORDER — LATANOPROST 50 UG/ML
1 SOLUTION/ DROPS OPHTHALMIC NIGHTLY
Status: DISCONTINUED | OUTPATIENT
Start: 2022-09-28 | End: 2022-10-03 | Stop reason: HOSPADM

## 2022-09-27 RX ORDER — DEXTROSE MONOHYDRATE 50 MG/ML
1000 INJECTION, SOLUTION INTRAVENOUS
Status: COMPLETED | OUTPATIENT
Start: 2022-09-27 | End: 2022-09-27

## 2022-09-27 RX ADMIN — DEXTROSE 1000 ML: 5 SOLUTION INTRAVENOUS at 10:09

## 2022-09-27 RX ADMIN — CEFTRIAXONE 1 G: 1 INJECTION, SOLUTION INTRAVENOUS at 09:09

## 2022-09-27 RX ADMIN — SODIUM CHLORIDE 1000 ML: 0.9 INJECTION, SOLUTION INTRAVENOUS at 07:09

## 2022-09-27 NOTE — ED NOTES
Patient presents to ED via EMS from [facility] due to altered mental status and progressive decline. Patient responsive to voice, appears fatigued/lethargic, and thin. Patient presents with multiple wounds to bilateral feet (covered in dressing and z-flex boots), sacrum (covered in abd pad), and left ear (covered with gauze). Patient presents to ED with advance directive status of DNR and no PEG tube feedings. Incontinence care provided, BM noted, urine sample obtained via straight catheter; 75 ml output. Patient on RA @ 99%. EKG obtained and given to ED provider.

## 2022-09-27 NOTE — ED NOTES
Patient's daughter @ bedside would like to speak to MD regarding advance directives (specifically regarding PEG tube feedings). Dr. Rodgers notified via secure chat.

## 2022-09-27 NOTE — ED PROVIDER NOTES
Encounter Date: 9/27/2022       History     Chief Complaint   Patient presents with    Altered Mental Status     Pt presents to ED today via [facility] for AMS x several days, possible aspiration pneumonia, and failure to thrive   Daughter consulting with pt's physician's for PEG tube placement      Patient is an 82-year-old male with a past history of dysphagia, hyperlipidemia, hypertension diabetes who presents to the ED via EMS from [facility] with the complaint of altered mental status.  Per EMS and paperwork sent with patient, concern for change in LOC and possible aspiration pneumonia.  Per daughter at bedside, patient with decreased p.o. intake secondary to reported dysphagia.  She stated to me that the patient is alert oriented to person place and time was most recently at his baseline as of last Sunday. Pt's daughter also requesting placement of PEG tube secondary to patient's decreased p.o. intake.  Daughter denies any recent illness or medication changes.    Patient is a DNR.     Review of patient's allergies indicates:  No Known Allergies  Past Medical History:   Diagnosis Date    Essential hypertension     Legal blindness     Preglaucoma     Type 2 diabetes mellitus      No past surgical history on file.  No family history on file.  Social History     Tobacco Use    Smoking status: Former    Smokeless tobacco: Never   Substance Use Topics    Alcohol use: Not Currently    Drug use: Not Currently     Review of Systems   Unable to perform ROS: Mental status change     Physical Exam     Initial Vitals [09/27/22 1721]   BP Pulse Resp Temp SpO2   118/66 103 16 98 °F (36.7 °C) 99 %      MAP       --         Physical Exam    Nursing note and vitals reviewed.  Constitutional: He appears well-developed. No distress.   HENT:   Head: Normocephalic and atraumatic.   R cerumen impaction    Eyes: EOM are normal. Pupils are equal, round, and reactive to light.   Neck: Neck supple.   Normal range of  motion.  Cardiovascular:  Normal rate, regular rhythm, normal heart sounds and intact distal pulses.           Pulmonary/Chest: Breath sounds normal.   Abdominal: Abdomen is soft. Bowel sounds are normal.   Musculoskeletal:         General: Normal range of motion.      Cervical back: Normal range of motion and neck supple.     Neurological: He is alert.   Alert. Pt not oriented to place or time.    Skin: Skin is warm. Capillary refill takes less than 2 seconds.       ED Course   Critical Care    Date/Time: 9/27/2022 9:23 PM  Performed by: Lambert Rodgers MD  Authorized by: Lambert Rodgers MD   Direct patient critical care time: 15 minutes  Additional history critical care time: 5 minutes  Ordering / reviewing critical care time: 5 minutes  Documentation critical care time: 10 minutes  Consult with family critical care time: 10 minutes  Total critical care time (exclusive of procedural time) : 45 minutes      Labs Reviewed   URINALYSIS, REFLEX TO URINE CULTURE - Abnormal; Notable for the following components:       Result Value    Appearance, UA Hazy (*)     Protein, UA 1+ (*)     Ketones, UA 1+ (*)     Urobilinogen, UA 2.0-3.0 (*)     Leukocytes, UA Trace (*)     All other components within normal limits    Narrative:     Specimen Source->Urine   TROPONIN I - Abnormal; Notable for the following components:    Troponin I 0.042 (*)     All other components within normal limits   URINALYSIS MICROSCOPIC - Abnormal; Notable for the following components:    Hyaline Casts, UA 4 (*)     All other components within normal limits    Narrative:     Specimen Source->Urine   COMPREHENSIVE METABOLIC PANEL - Abnormal; Notable for the following components:    Sodium 155 (*)     Chloride 121 (*)     BUN 30 (*)     Albumin 2.5 (*)     All other components within normal limits   CBC WITHOUT DIFFERENTIAL - Abnormal; Notable for the following components:    RBC 4.14 (*)     Hemoglobin 11.4 (*)     Hematocrit 37.5 (*)     MCHC  30.4 (*)     RDW 16.1 (*)     All other components within normal limits   CULTURE, BLOOD   CULTURE, BLOOD   PROCALCITONIN   B-TYPE NATRIURETIC PEPTIDE   MAGNESIUM   OSMOLALITY, SERUM   SARS-COV-2 RDRP GENE    Narrative:     This test utilizes isothermal nucleic acid amplification   technology to detect the SARS-CoV-2 RdRp nucleic acid segment.   The analytical sensitivity (limit of detection) is 125 genome   equivalents/mL.   A POSITIVE result implies infection with the SARS-CoV-2 virus;   the patient is presumed to be contagious.     A NEGATIVE result means that SARS-CoV-2 nucleic acids are not   present above the limit of detection. A NEGATIVE result should be   treated as presumptive. It does not rule out the possibility of   COVID-19 and should not be the sole basis for treatment decisions.   If COVID-19 is strongly suspected based on clinical and exposure   history, re-testing using an alternate molecular assay should be   considered.   This test is only for use under the Food and Drug   Administration s Emergency Use Authorization (EUA).   Commercial kits are provided by "[employer].   Performance characteristics of the EUA have been independently   verified by [facility] Department of   Pathology and Laboratory Medicine.   _________________________________________________________________   The authorized Fact Sheet for Healthcare Providers and the authorized Fact   Sheet for Patients of the ID NOW COVID-19 are available on the FDA   website:     https://www.fda.gov/media/751110/download  https://www.fda.gov/media/691978/download            EKG Readings: (Independently Interpreted)   Normal sinus rhythm EKG.  Drug to be unchanged compared to most recent in August 2022     Imaging Results              X-Ray Chest AP Portable (Final result)  Result time 09/27/22 19:55:07      Final result by Alanis Short MD (09/27/22 19:55:07)                   Impression:      Essentially stable chest  x-ray as compared to the prior exam with chronic reticulonodular opacities and relative prominence of the right hilum in this patient with volume loss and mediastinal shift to the right.    Severe COPD changes.  No cardiomegaly or acute pleural abnormality.    Consideration for short-term nonemergent follow-up chest CT/PET CT suggested to document stability of right hilar prominent soft tissues and nodular opacities as seen on CTA chest 09/01/2022 to exclude the possibility of neoplastic process in this patient with COPD.      Electronically signed by: Alanis Short  Date:    09/27/2022  Time:    19:55               Narrative:    EXAMINATION:  XR CHEST AP PORTABLE    CLINICAL HISTORY:  Altered mental status, unspecified    TECHNIQUE:  Single frontal view of the chest was performed.    COMPARISON:  09/01/2022, 08/22/2022, 04/20/2022, 08/21/2021 chest x-ray CT chest 09/01/2022    FINDINGS:  Patient is rotated to the right limiting evaluation of the right hilum which is relatively prominent as noted on prior CT.  The cardiomediastinal silhouette is stable and not significantly enlarged.  There is tortuosity of the aorta and atherosclerotic calcification also noted.    Severe emphysematous changes within both lungs are seen with volume loss on the right and shift of the mediastinum to the right.  Stable band like areas of scarring or chronic atelectasis are noted.  Reticulonodular chronic opacities are also seen particularly in the right perihilar region and lower lung limiting evaluation for new small nodular foci.  There is no evidence of acute pleural effusion or pneumothorax.  Osseous structures grossly appear intact.                                        CT Head Without Contrast (Final result)  Result time 09/27/22 19:29:14      Final result by Ulises Short MD (09/27/22 19:29:14)                   Impression:      No acute abnormality.    Stable sphenoid sinus disease and external auditory canal soft  tissue.  Correlate for impacted cerumen versus epithelial mass.    This report was flagged in Epic as abnormal.      Electronically signed by: Uliess Short  Date:    09/27/2022  Time:    19:29               Narrative:    EXAMINATION:  CT HEAD WITHOUT CONTRAST    CLINICAL HISTORY:  Mental status change, unknown cause;    TECHNIQUE:  Low dose axial CT images obtained throughout the head without intravenous contrast. Sagittal and coronal reconstructions were performed.    COMPARISON:  CT of the brain, 08/31/2022    FINDINGS:  Intracranial compartment:    Ventricles and sulci are stable in size for age without evidence of hydrocephalus. No extra-axial blood or fluid collections.    The brain parenchyma appears stable.  No parenchymal mass, hemorrhage, edema or major vascular distribution infarct.    Skull/extracranial contents (limited evaluation): No fracture. Mastoids remain clear.  Air-fluid interface is within the sphenoid sinuses are again seen.  Soft tissue filling the external auditory canal on the right greater than left is again noted.                                       Medications   dextrose 5 % infusion 1,000 mL (has no administration in time range)   cefTRIAXone (ROCEPHIN) 1 g/50 mL D5W IVPB (has no administration in time range)   0.9%  NaCl infusion (0 mLs Intravenous Stopped 9/27/22 2136)     Medical Decision Making:   ED Management:  - labs notable for elevated troponin, hypernatremia; CXR demonstrates stable findings compared to most recent radiograph; CT head without acute intracranial abnormality per final radiology read; UA without findings of infection; EKG NSR with PACs unchanged compared to most recent EKG; in light of hypernatremia, will obtain serum osm, start on D5 at 100ml/hr; will admit to [facility] for further evaluation and management                        Clinical Impression:   Final diagnoses:  [R41.82] Altered mental status (Primary)  [E87.0] Hypernatremia  [R77.8] Elevated  troponin  [H61.21] Impacted cerumen of right ear  [R62.7] Failure to thrive in adult        ED Disposition Condition    Observation Stable                Lambert Rodgers MD  09/27/22 9272

## 2022-09-28 PROBLEM — H61.21 IMPACTED CERUMEN OF RIGHT EAR: Status: ACTIVE | Noted: 2022-09-28

## 2022-09-28 PROBLEM — E87.0 HYPERNATREMIA: Status: ACTIVE | Noted: 2022-09-28

## 2022-09-28 PROBLEM — R62.7 FAILURE TO THRIVE IN ADULT: Status: ACTIVE | Noted: 2022-09-28

## 2022-09-28 LAB
ALBUMIN SERPL BCP-MCNC: 2.5 G/DL (ref 3.5–5.2)
ALP SERPL-CCNC: 107 U/L (ref 55–135)
ALT SERPL W/O P-5'-P-CCNC: 17 U/L (ref 10–44)
ANION GAP SERPL CALC-SCNC: 10 MMOL/L (ref 8–16)
ANION GAP SERPL CALC-SCNC: 10 MMOL/L (ref 8–16)
ANION GAP SERPL CALC-SCNC: 12 MMOL/L (ref 8–16)
ANION GAP SERPL CALC-SCNC: 13 MMOL/L (ref 8–16)
ANION GAP SERPL CALC-SCNC: 14 MMOL/L (ref 8–16)
AST SERPL-CCNC: 26 U/L (ref 10–40)
BASOPHILS # BLD AUTO: 0.01 K/UL (ref 0–0.2)
BASOPHILS NFR BLD: 0.1 % (ref 0–1.9)
BILIRUB SERPL-MCNC: 0.6 MG/DL (ref 0.1–1)
BUN SERPL-MCNC: 23 MG/DL (ref 8–23)
BUN SERPL-MCNC: 24 MG/DL (ref 8–23)
BUN SERPL-MCNC: 26 MG/DL (ref 8–23)
BUN SERPL-MCNC: 27 MG/DL (ref 8–23)
BUN SERPL-MCNC: 28 MG/DL (ref 8–23)
CALCIUM SERPL-MCNC: 8.5 MG/DL (ref 8.7–10.5)
CALCIUM SERPL-MCNC: 8.6 MG/DL (ref 8.7–10.5)
CALCIUM SERPL-MCNC: 8.6 MG/DL (ref 8.7–10.5)
CALCIUM SERPL-MCNC: 9.1 MG/DL (ref 8.7–10.5)
CALCIUM SERPL-MCNC: 9.2 MG/DL (ref 8.7–10.5)
CHLORIDE SERPL-SCNC: 114 MMOL/L (ref 95–110)
CHLORIDE SERPL-SCNC: 115 MMOL/L (ref 95–110)
CHLORIDE SERPL-SCNC: 116 MMOL/L (ref 95–110)
CHLORIDE SERPL-SCNC: 118 MMOL/L (ref 95–110)
CHLORIDE SERPL-SCNC: 120 MMOL/L (ref 95–110)
CHOLEST SERPL-MCNC: 143 MG/DL (ref 120–199)
CHOLEST/HDLC SERPL: 4.1 {RATIO} (ref 2–5)
CO2 SERPL-SCNC: 21 MMOL/L (ref 23–29)
CO2 SERPL-SCNC: 25 MMOL/L (ref 23–29)
CO2 SERPL-SCNC: 26 MMOL/L (ref 23–29)
CO2 SERPL-SCNC: 27 MMOL/L (ref 23–29)
CO2 SERPL-SCNC: 28 MMOL/L (ref 23–29)
CREAT SERPL-MCNC: 0.8 MG/DL (ref 0.5–1.4)
CREAT SERPL-MCNC: 0.9 MG/DL (ref 0.5–1.4)
CREAT SERPL-MCNC: 1 MG/DL (ref 0.5–1.4)
CREAT UR-MCNC: 112.8 MG/DL (ref 23–375)
DIFFERENTIAL METHOD: ABNORMAL
EOSINOPHIL # BLD AUTO: 0 K/UL (ref 0–0.5)
EOSINOPHIL NFR BLD: 0.2 % (ref 0–8)
ERYTHROCYTE [DISTWIDTH] IN BLOOD BY AUTOMATED COUNT: 15.9 % (ref 11.5–14.5)
EST. GFR  (NO RACE VARIABLE): >60 ML/MIN/1.73 M^2
ESTIMATED AVG GLUCOSE: 105 MG/DL (ref 68–131)
FERRITIN SERPL-MCNC: 429 NG/ML (ref 20–300)
GLUCOSE SERPL-MCNC: 115 MG/DL (ref 70–110)
GLUCOSE SERPL-MCNC: 131 MG/DL (ref 70–110)
GLUCOSE SERPL-MCNC: 137 MG/DL (ref 70–110)
GLUCOSE SERPL-MCNC: 139 MG/DL (ref 70–110)
GLUCOSE SERPL-MCNC: 147 MG/DL (ref 70–110)
HBA1C MFR BLD: 5.3 % (ref 4–5.6)
HCT VFR BLD AUTO: 37.3 % (ref 40–54)
HDLC SERPL-MCNC: 35 MG/DL (ref 40–75)
HDLC SERPL: 24.5 % (ref 20–50)
HGB BLD-MCNC: 11.1 G/DL (ref 14–18)
IMM GRANULOCYTES # BLD AUTO: 0.06 K/UL (ref 0–0.04)
IMM GRANULOCYTES NFR BLD AUTO: 0.7 % (ref 0–0.5)
IRON SERPL-MCNC: 50 UG/DL (ref 45–160)
LDLC SERPL CALC-MCNC: 83.8 MG/DL (ref 63–159)
LYMPHOCYTES # BLD AUTO: 1.9 K/UL (ref 1–4.8)
LYMPHOCYTES NFR BLD: 21.8 % (ref 18–48)
MAGNESIUM SERPL-MCNC: 1.7 MG/DL (ref 1.6–2.6)
MCH RBC QN AUTO: 27.4 PG (ref 27–31)
MCHC RBC AUTO-ENTMCNC: 29.8 G/DL (ref 32–36)
MCV RBC AUTO: 92 FL (ref 82–98)
MONOCYTES # BLD AUTO: 1 K/UL (ref 0.3–1)
MONOCYTES NFR BLD: 11.5 % (ref 4–15)
NEUTROPHILS # BLD AUTO: 5.7 K/UL (ref 1.8–7.7)
NEUTROPHILS NFR BLD: 65.7 % (ref 38–73)
NONHDLC SERPL-MCNC: 108 MG/DL
NRBC BLD-RTO: 0 /100 WBC
OSMOLALITY SERPL: 338 MOSM/KG (ref 280–300)
PHOSPHATE SERPL-MCNC: 1.6 MG/DL (ref 2.7–4.5)
PHOSPHATE SERPL-MCNC: 1.7 MG/DL (ref 2.7–4.5)
PHOSPHATE SERPL-MCNC: 2.1 MG/DL (ref 2.7–4.5)
PLATELET # BLD AUTO: 290 K/UL (ref 150–450)
PMV BLD AUTO: 10.2 FL (ref 9.2–12.9)
POCT GLUCOSE: 116 MG/DL (ref 70–110)
POCT GLUCOSE: 147 MG/DL (ref 70–110)
POCT GLUCOSE: 150 MG/DL (ref 70–110)
POTASSIUM SERPL-SCNC: 3.2 MMOL/L (ref 3.5–5.1)
POTASSIUM SERPL-SCNC: 3.8 MMOL/L (ref 3.5–5.1)
POTASSIUM SERPL-SCNC: 4 MMOL/L (ref 3.5–5.1)
POTASSIUM SERPL-SCNC: 4.1 MMOL/L (ref 3.5–5.1)
POTASSIUM SERPL-SCNC: 4.6 MMOL/L (ref 3.5–5.1)
POTASSIUM UR-SCNC: 44 MMOL/L (ref 15–95)
PROT SERPL-MCNC: 6.7 G/DL (ref 6–8.4)
PROT UR-MCNC: 60 MG/DL (ref 0–15)
PROT/CREAT UR: 0.53 MG/G{CREAT} (ref 0–0.2)
RBC # BLD AUTO: 4.05 M/UL (ref 4.6–6.2)
SATURATED IRON: 40 % (ref 20–50)
SODIUM SERPL-SCNC: 149 MMOL/L (ref 136–145)
SODIUM SERPL-SCNC: 152 MMOL/L (ref 136–145)
SODIUM SERPL-SCNC: 155 MMOL/L (ref 136–145)
SODIUM SERPL-SCNC: 155 MMOL/L (ref 136–145)
SODIUM SERPL-SCNC: 158 MMOL/L (ref 136–145)
SODIUM UR-SCNC: 29 MMOL/L (ref 20–250)
TOTAL IRON BINDING CAPACITY: 126 UG/DL (ref 250–450)
TRANSFERRIN SERPL-MCNC: 85 MG/DL (ref 200–375)
TRIGL SERPL-MCNC: 121 MG/DL (ref 30–150)
TROPONIN I SERPL DL<=0.01 NG/ML-MCNC: 0.05 NG/ML (ref 0–0.03)
WBC # BLD AUTO: 8.72 K/UL (ref 3.9–12.7)

## 2022-09-28 PROCEDURE — 94761 N-INVAS EAR/PLS OXIMETRY MLT: CPT

## 2022-09-28 PROCEDURE — 25000003 PHARM REV CODE 250: Performed by: STUDENT IN AN ORGANIZED HEALTH CARE EDUCATION/TRAINING PROGRAM

## 2022-09-28 PROCEDURE — 97165 OT EVAL LOW COMPLEX 30 MIN: CPT

## 2022-09-28 PROCEDURE — 83036 HEMOGLOBIN GLYCOSYLATED A1C: CPT | Performed by: STUDENT IN AN ORGANIZED HEALTH CARE EDUCATION/TRAINING PROGRAM

## 2022-09-28 PROCEDURE — 84156 ASSAY OF PROTEIN URINE: CPT | Performed by: STUDENT IN AN ORGANIZED HEALTH CARE EDUCATION/TRAINING PROGRAM

## 2022-09-28 PROCEDURE — 92610 EVALUATE SWALLOWING FUNCTION: CPT

## 2022-09-28 PROCEDURE — 63600175 PHARM REV CODE 636 W HCPCS: Performed by: STUDENT IN AN ORGANIZED HEALTH CARE EDUCATION/TRAINING PROGRAM

## 2022-09-28 PROCEDURE — 84484 ASSAY OF TROPONIN QUANT: CPT | Performed by: STUDENT IN AN ORGANIZED HEALTH CARE EDUCATION/TRAINING PROGRAM

## 2022-09-28 PROCEDURE — 84100 ASSAY OF PHOSPHORUS: CPT | Performed by: STUDENT IN AN ORGANIZED HEALTH CARE EDUCATION/TRAINING PROGRAM

## 2022-09-28 PROCEDURE — 80061 LIPID PANEL: CPT | Performed by: STUDENT IN AN ORGANIZED HEALTH CARE EDUCATION/TRAINING PROGRAM

## 2022-09-28 PROCEDURE — 84133 ASSAY OF URINE POTASSIUM: CPT | Performed by: STUDENT IN AN ORGANIZED HEALTH CARE EDUCATION/TRAINING PROGRAM

## 2022-09-28 PROCEDURE — 84466 ASSAY OF TRANSFERRIN: CPT | Performed by: STUDENT IN AN ORGANIZED HEALTH CARE EDUCATION/TRAINING PROGRAM

## 2022-09-28 PROCEDURE — 85025 COMPLETE CBC W/AUTO DIFF WBC: CPT | Performed by: STUDENT IN AN ORGANIZED HEALTH CARE EDUCATION/TRAINING PROGRAM

## 2022-09-28 PROCEDURE — 83735 ASSAY OF MAGNESIUM: CPT | Performed by: STUDENT IN AN ORGANIZED HEALTH CARE EDUCATION/TRAINING PROGRAM

## 2022-09-28 PROCEDURE — 80048 BASIC METABOLIC PNL TOTAL CA: CPT | Mod: XB | Performed by: STUDENT IN AN ORGANIZED HEALTH CARE EDUCATION/TRAINING PROGRAM

## 2022-09-28 PROCEDURE — 36415 COLL VENOUS BLD VENIPUNCTURE: CPT | Performed by: STUDENT IN AN ORGANIZED HEALTH CARE EDUCATION/TRAINING PROGRAM

## 2022-09-28 PROCEDURE — 97161 PT EVAL LOW COMPLEX 20 MIN: CPT

## 2022-09-28 PROCEDURE — 84300 ASSAY OF URINE SODIUM: CPT | Performed by: STUDENT IN AN ORGANIZED HEALTH CARE EDUCATION/TRAINING PROGRAM

## 2022-09-28 PROCEDURE — 11000001 HC ACUTE MED/SURG PRIVATE ROOM

## 2022-09-28 PROCEDURE — 80048 BASIC METABOLIC PNL TOTAL CA: CPT | Mod: 91,XB | Performed by: STUDENT IN AN ORGANIZED HEALTH CARE EDUCATION/TRAINING PROGRAM

## 2022-09-28 PROCEDURE — 83935 ASSAY OF URINE OSMOLALITY: CPT | Performed by: STUDENT IN AN ORGANIZED HEALTH CARE EDUCATION/TRAINING PROGRAM

## 2022-09-28 PROCEDURE — 82728 ASSAY OF FERRITIN: CPT | Performed by: STUDENT IN AN ORGANIZED HEALTH CARE EDUCATION/TRAINING PROGRAM

## 2022-09-28 PROCEDURE — 99900035 HC TECH TIME PER 15 MIN (STAT)

## 2022-09-28 PROCEDURE — 80053 COMPREHEN METABOLIC PANEL: CPT | Performed by: STUDENT IN AN ORGANIZED HEALTH CARE EDUCATION/TRAINING PROGRAM

## 2022-09-28 RX ORDER — BRIMONIDINE TARTRATE 1.5 MG/ML
1 SOLUTION/ DROPS OPHTHALMIC 2 TIMES DAILY
Status: DISCONTINUED | OUTPATIENT
Start: 2022-09-28 | End: 2022-10-03 | Stop reason: HOSPADM

## 2022-09-28 RX ORDER — ASCORBIC ACID 500 MG
500 TABLET ORAL 2 TIMES DAILY
Status: ON HOLD | COMMUNITY
End: 2022-10-03 | Stop reason: HOSPADM

## 2022-09-28 RX ORDER — SODIUM,POTASSIUM PHOSPHATES 280-250MG
1 POWDER IN PACKET (EA) ORAL 3 TIMES DAILY
Status: DISCONTINUED | OUTPATIENT
Start: 2022-09-28 | End: 2022-09-29

## 2022-09-28 RX ORDER — DEXTROSE MONOHYDRATE 50 MG/ML
INJECTION, SOLUTION INTRAVENOUS CONTINUOUS
Status: ACTIVE | OUTPATIENT
Start: 2022-09-28 | End: 2022-09-29

## 2022-09-28 RX ORDER — MAGNESIUM SULFATE HEPTAHYDRATE 40 MG/ML
2 INJECTION, SOLUTION INTRAVENOUS ONCE
Status: COMPLETED | OUTPATIENT
Start: 2022-09-28 | End: 2022-09-28

## 2022-09-28 RX ADMIN — TAMSULOSIN HYDROCHLORIDE 0.4 MG: 0.4 CAPSULE ORAL at 10:09

## 2022-09-28 RX ADMIN — BRIMONIDINE TARTRATE 1 DROP: 1.5 SOLUTION OPHTHALMIC at 10:09

## 2022-09-28 RX ADMIN — LOSARTAN POTASSIUM 100 MG: 50 TABLET, FILM COATED ORAL at 09:09

## 2022-09-28 RX ADMIN — DEXTROSE: 5 SOLUTION INTRAVENOUS at 03:09

## 2022-09-28 RX ADMIN — DORZOLAMIDE HYDROCHLORIDE 1 DROP: 20 SOLUTION/ DROPS OPHTHALMIC at 10:09

## 2022-09-28 RX ADMIN — POTASSIUM & SODIUM PHOSPHATES POWDER PACK 280-160-250 MG 1 PACKET: 280-160-250 PACK at 11:09

## 2022-09-28 RX ADMIN — ATORVASTATIN CALCIUM 20 MG: 20 TABLET, FILM COATED ORAL at 10:09

## 2022-09-28 RX ADMIN — TIMOLOL MALEATE 1 DROP: 5 SOLUTION/ DROPS OPHTHALMIC at 10:09

## 2022-09-28 RX ADMIN — APIXABAN 5 MG: 5 TABLET, FILM COATED ORAL at 10:09

## 2022-09-28 RX ADMIN — BRIMONIDINE TARTRATE 1 DROP: 1.5 SOLUTION OPHTHALMIC at 09:09

## 2022-09-28 RX ADMIN — FINASTERIDE 5 MG: 5 TABLET, FILM COATED ORAL at 09:09

## 2022-09-28 RX ADMIN — POTASSIUM & SODIUM PHOSPHATES POWDER PACK 280-160-250 MG 1 PACKET: 280-160-250 PACK at 10:09

## 2022-09-28 RX ADMIN — MAGNESIUM SULFATE 2 G: 2 INJECTION INTRAVENOUS at 09:09

## 2022-09-28 RX ADMIN — APIXABAN 5 MG: 5 TABLET, FILM COATED ORAL at 09:09

## 2022-09-28 RX ADMIN — LATANOPROST 1 DROP: 50 SOLUTION OPHTHALMIC at 10:09

## 2022-09-28 RX ADMIN — TIMOLOL MALEATE 1 DROP: 5 SOLUTION/ DROPS OPHTHALMIC at 09:09

## 2022-09-28 RX ADMIN — ESCITALOPRAM OXALATE 5 MG: 5 TABLET, FILM COATED ORAL at 09:09

## 2022-09-28 RX ADMIN — POTASSIUM BICARBONATE 50 MEQ: 978 TABLET, EFFERVESCENT ORAL at 03:09

## 2022-09-28 RX ADMIN — POTASSIUM & SODIUM PHOSPHATES POWDER PACK 280-160-250 MG 1 PACKET: 280-160-250 PACK at 04:09

## 2022-09-28 RX ADMIN — DORZOLAMIDE HYDROCHLORIDE 1 DROP: 20 SOLUTION/ DROPS OPHTHALMIC at 09:09

## 2022-09-28 NOTE — ED NOTES
"Patient received in ED from [facility]. Day shift RN states pt is being seen for AMS and suspected PNA. Pt at this time is resting in bed, bed bound. Pt arouses to voice, answers simple questions. Pt IV flushes without meeting resistance. Daughter at bedside reports her father is not "getting adequate care where he currently lives". States pt has not been eating lately, is requesting peg tube placement. Side rails raised and bed locked in lowest position. Pt NSR on monitor at this time, VSS.   Daughter at bedside.      Pain:  None per pt.      Psychosocial:  Patient is calm and cooperative.  Patients insight and judgement are appropriate to situation.  Appears clean, well maintained, with clothing appropriate to environment.  No evidence of delusions, hallucinations, or psychosis.     Neuro:  Eyes open spontaneously.  Awake, alert, oriented x 3.  Speaks in a low tone of voice and softly, capable of answering simple questions.  Tolerating saliva secretions well.  Able to follow commands, demonstrating ability to actively and appropriately communicate within context of current conversation.  Symmetrical facial muscles. Adequate muscle tone present. Minimal movement but is purposeful.     Airway:  Bilateral chest rise and fall.  RR regular and non-labored.  Lung sounds slightly diminished, 100% on RA     Circulatory:  Skin warm, dry, and pink.  Apical and radial pulses strong and regular.  Capillary refill/skin blanching less than 3 seconds to distal of 4 extremities.     Abdomen:  Abdomen soft and non-distended.  Positive normo-active bowel sounds x 4 quadrants.       Urinary:  Pt wearing diaper, clean. No urine made thus far.      Extremities:  No redness, heat, swelling. Arms are naturally in fixed/flexed position.      Skin:  Pt has stage 2 wound to right ear, covered with clean dressing dated 9/27. Pt has diaper on, clean. Pt has wound noted to sacrum, covered with dressing dated 9/27, no drainage noted. Pt has heel " protectors on both feet. Pillow wedged between knees.

## 2022-09-28 NOTE — ED NOTES
Pt wounds reassessed and compared to those already filed. Wound on ear is CDI, dated 9/27. No wound noted to scrotum. No altered skin integrity noted on right hip. [facility] internal medicine at bedside assisting in turning pt.   Dressing on coccyx region was dirty. Site cleaned with Vashe solution, dried with 4x4 and tegaderm applied to cover site.   Signed and dated 9/27/22 KISHORE RN.   Pt in no acute distress at this time. IVF actively infusing. Pt repositioned in bed for comfort.

## 2022-09-28 NOTE — PLAN OF CARE
AAOX1, Oriented to self only  VSS on RA  New PIV intact  D5 infusing per order  Voided adequate   2BM this shift  Meds given per order  Labs Q6 hr  Tube feeds started at 10xx/hr  FWF Q4 hrs done  Bed low, locked and call light in reach

## 2022-09-28 NOTE — PT/OT/SLP EVAL
"Physical Therapy Evaluation and Discharge Note    Patient Name:  Adalberto Medina   MRN:  [id number]    Recommendations:     Discharge Recommendations:  nursing facility, basic   Discharge Equipment Recommendations:  (defer to NH)   Barriers to discharge: None    Assessment:     Adalberto Medina is a 82 y.o. male admitted with a medical diagnosis of Hypernatremia. Per chart pt is dependent and bed bound at baseline. Pt is a NH resident, called to obtain information from [facility], [facility] but unable to reach nurse on 3 attempts. Pt required total A x 2 for repositioning in bed. Was able to follow command to squeeze hand minimally but otherwise unable to assist with any UE/LE movements or mobility. Significant BLE contractions with LEs windswept to the right. No further IP PT needs, d/c PT and recommend pt return with 24/7 care upon d/c.    Recent Surgery: * No surgery found *      Plan:     During this hospitalization, patient does not require further acute PT services.  Please re-consult if situation changes.      Subjective     Chief Complaint: stating "ouch" during PROM of UEs/LEs  Patient/Family Comments/goals: pt able to state his last name, that he has "two" children and states "ouch"  Pain/Comfort:  Pain Rating 1:  (states "ouch" with all UE/LE PROM completed)    Patients cultural, spiritual, Uatsdin conflicts given the current situation: no    Living Environment:  Per chart pt is a resident at [facility], [facility] and is dependent care and bed bound at baseline.  Called to obtain information from [facility], [facility] but unable to reach nurse on 3 attempts.  Equipment used at home: hospital bed.  DME owned (not currently used): none.  Upon discharge, patient will have assistance from NH staff.    Objective:     Communicated with nurse Mcclellan prior to session. Patient found right sidelying with bed alarm, telemetry, peripheral IV, NG tube, Condom Catheter, pressure relief boots upon PT entry to room.    General Precautions: Standard, fall "   Orthopedic Precautions:N/A   Braces: N/A     Exams:  Pt somnolent but able to awaken to answer several questions but pt speaking in a whisper making it hard to understand him  Cognitive Exam:  Patient is oriented to Person  Postural Exam:  Patient presented with the following abnormalities:    -       LEs windswept to the right and very stiff body, remaining in flexed position, unable to extend when bed positioned completely flat  Skin Integrity/Edema:      -       Skin integrity: per chart stage 3 sacral wound and noted B lower legs being wrapped  RLE ROM: Deficits: contracted with R knee lacking ~90 deg from extension passively  LLE ROM: Deficits: contracted with R knee lacking ~100 deg from extension passively     Functional Mobility:  Bed Mobility:     Rolling Left:  dependence  Rolling Right: dependence  Scooting: dependence, of 2 persons, and draw sheet towards HOB    AM-PAC 6 CLICK MOBILITY  Total Score:6       Therapeutic Activities and Exercises:  Educated pt on role of PT.  Pt positioned on R side and significantly leaning to the right.  Paused NGT feeding for session.  HOB lowered to scoot pt up in bed and reposition pt on his L side.  HOB elevated and attempted UE/LE ROM assessment.  Pt able to follow commands to minimally squeeze hands but otherwise unable to assist with mobility.  Pt with less ROM to LUE/LLE compared to R side.  Pt required dependent assist for all mobility.    AM-PAC 6 CLICK MOBILITY  Total Score:6     Patient left left sidelying with all lines intact, call button in reach, bed alarm on, and nurse notified.    GOALS:   Multidisciplinary Problems       Physical Therapy Goals          Problem: Physical Therapy    Goal Priority Disciplines Outcome Goal Variances Interventions   Physical Therapy Goal     PT, PT/OT Unable to Meet, Plan Revised                         History:     Past Medical History:   Diagnosis Date    Essential hypertension     Legal blindness     Preglaucoma     Type  2 diabetes mellitus        No past surgical history on file.    Time Tracking:     PT Received On: 09/28/22  PT Start Time: 1406     PT Stop Time: 1421  PT Total Time (min): 15 min with OT    Billable Minutes: Evaluation 15      09/28/2022

## 2022-09-28 NOTE — PROGRESS NOTES
[facility] Internal Medicine Progress Note    Primary Team: [facility] Hospital Medicine B  Attending Physician: Chandana Joyce MD  Resident: Kathy/Stephanie     Adalberto Medina is a 82 y.o. male who is being followed by the [facility] IM service at [facility] for acute encephalopathy 2/2 dehydration and hypernatremia.     Subjective:      Overnight, NG placed and free water/enteral nutrition started. This morning, no acute changes.      Objective:   Last 24 Hour Vital Signs:  BP  Min: 118/66  Max: 184/82  Temp  Av.7 °F (36.5 °C)  Min: 96.9 °F (36.1 °C)  Max: 98.3 °F (36.8 °C)  Pulse  Av.6  Min: 96  Max: 103  Resp  Av.7  Min: 14  Max: 20  SpO2  Av.4 %  Min: 97 %  Max: 100 %  Weight  Av.6 kg (122 lb 9.2 oz)  Min: 55.6 kg (122 lb 9.2 oz)  Max: 55.6 kg (122 lb 9.2 oz)  No intake/output data recorded.    Physical Examination:  General:  Cachectic appearing elderly gentleman, sleeping   HEENT:  Temporal wasting, no discharge from either eye, no scleral icterus, R ear with abrasion/wound with bandage, no nasal discharge, dry mucous membranes   Respiratory: No increased WOB, symmetrical chest rise, no wheezing or rhonchi   Cardiovascular: Normal rate, regular rhythm and normal heart sounds with no murmurs, rubs, or gallops, pulses 2+ and symmetric throughout, no LE edema  Abdominal: Soft, nontender, scaphoid abdomen  Musculoskeletal: Able to lift b/l hands against gravity, no effort to move b/l LE when prompted   Neurological: sleepy but wakes to loud voice or noxious stimuli, shakes head no when asked if he can state his name or where he is, no appreciable focal cranial nerve deficits but difficult to assess due to mentation and pt cooperation, able to lift b/l upper extremities to gravity and 5/5 b/l hand strength,   Extremities and Skin: Skin is warm and dry, stage 3 sacral wound, abrasion to R ear, b/l feet wrapped in guaze     Laboratory:  Laboratory Data   Recent Results (from the past 12  hour(s))   Procalcitonin    Collection Time: 09/27/22  6:11 PM   Result Value Ref Range    Procalcitonin 0.09 <0.25 ng/mL   Troponin I    Collection Time: 09/27/22  6:11 PM   Result Value Ref Range    Troponin I 0.042 (H) 0.000 - 0.026 ng/mL   Brain natriuretic peptide    Collection Time: 09/27/22  6:11 PM   Result Value Ref Range    BNP 44 0 - 99 pg/mL   Urinalysis, Reflex to Urine Culture Urine, Clean Catch    Collection Time: 09/27/22  6:47 PM    Specimen: Urine   Result Value Ref Range    Specimen UA Urine, Clean Catch     Color, UA Yellow Yellow, Straw, Shiela    Appearance, UA Hazy (A) Clear    pH, UA 6.0 5.0 - 8.0    Specific Gravity, UA 1.025 1.005 - 1.030    Protein, UA 1+ (A) Negative    Glucose, UA Negative Negative    Ketones, UA 1+ (A) Negative    Bilirubin (UA) Negative Negative    Occult Blood UA Negative Negative    Nitrite, UA Negative Negative    Urobilinogen, UA 2.0-3.0 (A) <2.0 EU/dL    Leukocytes, UA Trace (A) Negative   Urinalysis Microscopic    Collection Time: 09/27/22  6:47 PM   Result Value Ref Range    RBC, UA 4 0 - 4 /hpf    WBC, UA 4 0 - 5 /hpf    Bacteria None None-Occ /hpf    Squam Epithel, UA 1 /hpf    Hyaline Casts, UA 4 (A) 0-1/lpf /lpf    Microscopic Comment SEE COMMENT    POCT COVID-19 Rapid Screening    Collection Time: 09/27/22  6:48 PM   Result Value Ref Range    POC Rapid COVID Negative Negative     Acceptable Yes    Comprehensive metabolic panel    Collection Time: 09/27/22  8:19 PM   Result Value Ref Range    Sodium 155 (H) 136 - 145 mmol/L    Potassium 4.4 3.5 - 5.1 mmol/L    Chloride 121 (H) 95 - 110 mmol/L    CO2 23 23 - 29 mmol/L    Glucose 101 70 - 110 mg/dL    BUN 30 (H) 8 - 23 mg/dL    Creatinine 1.0 0.5 - 1.4 mg/dL    Calcium 9.1 8.7 - 10.5 mg/dL    Total Protein 6.6 6.0 - 8.4 g/dL    Albumin 2.5 (L) 3.5 - 5.2 g/dL    Total Bilirubin 0.7 0.1 - 1.0 mg/dL    Alkaline Phosphatase 102 55 - 135 U/L    AST 25 10 - 40 U/L    ALT 16 10 - 44 U/L    Anion Gap 11  8 - 16 mmol/L    eGFR >60 >60 mL/min/1.73 m^2   Magnesium    Collection Time: 09/27/22  8:19 PM   Result Value Ref Range    Magnesium 1.8 1.6 - 2.6 mg/dL   CBC Without Differential    Collection Time: 09/27/22  8:20 PM   Result Value Ref Range    WBC 7.52 3.90 - 12.70 K/uL    RBC 4.14 (L) 4.60 - 6.20 M/uL    Hemoglobin 11.4 (L) 14.0 - 18.0 g/dL    Hematocrit 37.5 (L) 40.0 - 54.0 %    MCV 91 82 - 98 fL    MCH 27.5 27.0 - 31.0 pg    MCHC 30.4 (L) 32.0 - 36.0 g/dL    RDW 16.1 (H) 11.5 - 14.5 %    Platelets 261 150 - 450 K/uL    MPV 10.1 9.2 - 12.9 fL   Troponin I    Collection Time: 09/28/22  1:05 AM   Result Value Ref Range    Troponin I 0.049 (H) 0.000 - 0.026 ng/mL   Basic metabolic panel    Collection Time: 09/28/22  1:05 AM   Result Value Ref Range    Sodium 158 (H) 136 - 145 mmol/L    Potassium 3.2 (L) 3.5 - 5.1 mmol/L    Chloride 118 (H) 95 - 110 mmol/L    CO2 28 23 - 29 mmol/L    Glucose 139 (H) 70 - 110 mg/dL    BUN 28 (H) 8 - 23 mg/dL    Creatinine 1.0 0.5 - 1.4 mg/dL    Calcium 9.2 8.7 - 10.5 mg/dL    Anion Gap 12 8 - 16 mmol/L    eGFR >60 >60 mL/min/1.73 m^2       Microbiology Data   Blood Culture, Routine   Date Value Ref Range Status   09/02/2022 No growth after 5 days.  Final   09/02/2022 No growth after 5 days.  Final       Other Results:  EKG (my interpretation): None new     Radiology Data   None new     Current Medications:     Infusions:   dextrose 5 % 100 mL/hr at 09/28/22 0303        Scheduled:   apixaban  5 mg Oral BID    atorvastatin  20 mg Oral QHS    brimonidine 0.15 % OPTH DROP  1 drop Both Eyes BID    dorzolamide  1 drop Both Eyes BID    EScitalopram oxalate  5 mg Oral Daily    finasteride  5 mg Oral Daily    latanoprost  1 drop Both Eyes QHS    losartan  100 mg Oral Daily    prednisoLONE acetate  1 drop Both Eyes QHS    tamsulosin  0.4 mg Oral QHS    timolol maleate 0.5%  1 drop Both Eyes BID        PRN:  sodium chloride 0.9%    Assessment:     Adalberto Medina is a 82 y.o.male with h/o HTN,  recent subsegmental PE, legal blindness, T2DM, depression, and BPH who presented to [facility] on 9/27/2022 with a primary complaint of altered mental status found to be dehydrated and hypernatremic. Pt with 7.5kg weight loss since discharge on 9/4. Na slowly improving, will increase enteral free water to 250 q4hrs and inc D5W infusion to 125/hr. Plan to discuss/gain collateral from daughter today.      Plan:     Acute (on chronic?) Encephalopathy:  - Most likely in setting of hyperNa   - Recent meds started were eliquis and lexapro (more likely to cause hypoNa/SIADH)  - CT head without evidence of infarct or bleed, possible impacted cerumen vs soft tissue mass   - Pt with similar presentation a few weeks ago, waxing and waning mental status, per last discharge pt was lethargic and A&Ox1 but per daughter's report to ED physician more with it   - Will get further collateral with dtr in am in addition to treating hyperNa     Hypernatremia and Dehydration 2/2 Dec Po Intake:   Failure to Thrive:   - Pt reportedly not eating at [facility] and issues with dysphagia during last admit   - S/p 1L NS and 1L d5 in the ED, recheck BMP as pt has 2.2L free water deficit (to get to 145 Na) on admission   - NG for enteral replacement in pt with encephalopathy pending speech eval   - ST consult   - Aim to correct Na by 10 in 24 hours - goal 145 by tomorrow evening   - Na 155 -> 158 --> 155: inc D5w infusion to 125 ml/hr and inc enteral free water to 250 q4hrs   - Continue trickle feeds and advance as tolerated with nutrition recs   - Pt with documented weight of 55.6kg, that is 7.5kg weight loss in 3 weeks (~12% BW in 3 weeks)      Elevated Troponin, stable:  - EKG stable with known LBBB  - Troponin stable, will repeat if worsening      PE:  - Continue Eliquis 5mg BID  - Recently dx at last hospital stay      HTN:  - BP initially low but inc after fluids  - Home meds include losartan 100mg daily, will  plan to restart in am if BP remains elevated      Severe Malnutrition:  - Pt unable to adequately take po for nourishment, will discuss with dtr in am as per pt's prior ACP docs he did not want PEG but dtr stated today that they did want PEG tube   - NG tube placement and initiate tube feeds  - Nutrition consult for optimization      T2DM:  - POCT glucose checks q4 hrs   - Initiate enteral feeds and follow labs for refeeding   - nutrition consult      Legally Blind  Depression:  - Cont home lexapro      BPH:  - Continue home flomax and proscar      Wounds, present on admission  - Consult wound care   - Frequent turns    Psychosis:  - H/o psychosis on risperidone 1mg qam and 2mg qhs - will hold in setting of encephalopathy and hyperNa      Diet: Tube feeds via NG   DVT PPX: Eliquis   Dispo: Pending improvement in dehydration/hyperNa and nutrition   CODE: DNR    Fay Mejia MD  [facility] Medicine-Pediatrics HO-IV  09/28/2022 3:56 AM    [facility] Medicine Hospitalist Pager numbers:   U Hospitalist Medicine Team A (Petra/Demetrio): [phone]  U Hospitalist Medicine Team B (Isiah/Ranjana):  [phone]

## 2022-09-28 NOTE — ASSESSMENT & PLAN NOTE
Contributing Nutrition Diagnosis  Inadequate energy intake    Related to (etiology):   dyasphagia    Signs and Symptoms (as evidenced by):   Pureed diet with decreased po intake    Interventions:  Collaboration with other providers    Nutrition Diagnosis Status:   Continues

## 2022-09-28 NOTE — ED NOTES
Pts daughter Hallie Franklin would like to speak with admit team regarding adjusting DNR order, would like pt to have PEG tube. Contact info: [phone].

## 2022-09-28 NOTE — PLAN OF CARE
SW met with pt at bedside to complete assessment. Pt is AxO 2 and able to verbally answer assessment questions.  Pt is from [facility]. Pt is bed bound and family is considering peg tube for nutrition supplementation. Pt currently has NG tube. Pt daughter Hallie is at bedside. At time of discharge pt is set to need ambulance transportation assistance back to facility. SW updated whiteboard with SW name and contact information. SW confirmed pt understanding of Observation unit and expected discharge plan. SW will continue to follow pt throughout care and assist with any discharge needs.         09/28/22 1022   Discharge Planning   Assessment Type Discharge Planning Brief Assessment   Resource/Environmental Concerns none   Support Systems Home care staff   Equipment Currently Used at Home hospital bed   Current Living Arrangements home/apartment/condo   Patient/Family Anticipates Transition to long-term care facility   Patient/Family Anticipated Services at Transition none   DME Needed Upon Discharge  none   Discharge Plan A Return to nursing home     Future Appointments   Date Time Provider Department Center   10/4/2022  9:30 AM Luis A Adam MD [facility]     [facility] Case Management  [phone]
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
## 2022-09-28 NOTE — PHARMACY MED REC
"[facility]           Pharmacy  Admission Medication Reconciliation     The home medication history was taken by Kennedy Washington PharmD.      Medication history obtained from Medications listed below were obtained from: Patient/family    Based on information gathered for medication list, you may go to "Admission" then "Reconcile Home Medications" tabs to review and/or act upon those items.     The home medication list has been updated by the Pharmacy department.   Please read ALL comments highlighted in yellow.   Please address this information as you see fit.    Feel free to contact us if you have any questions or require assistance.    The current inpatient medication list has been compared to the home medication list and the following discrepancies were noted:    Patient reports he/she IS TAKING the following which was not ordered upon admit  Zinc Sulfate 220mg daily    No current facility-administered medications on file prior to encounter.     Current Outpatient Medications on File Prior to Encounter   Medication Sig Dispense Refill    acetaminophen (TYLENOL) 500 MG tablet Take 500 mg by mouth 2 (two) times a day.      ALPHAGAN P 0.1 % Drop Place 1 drop into both eyes 2 (two) times a day.      apixaban (ELIQUIS) 5 mg Tab Take 1 tablet (5 mg total) by mouth 2 (two) times daily. 60 tablet 0    ascorbic acid, vitamin C, (VITAMIN C) 500 MG tablet Take 500 mg by mouth 2 (two) times daily.      atorvastatin (LIPITOR) 20 MG tablet Take 20 mg by mouth every evening.      dorzolamide (TRUSOPT) 2 % ophthalmic solution Place 1 drop into both eyes 2 (two) times a day.      EScitalopram oxalate (LEXAPRO) 5 MG Tab Take 1 tablet (5 mg total) by mouth once daily. 30 tablet 11    finasteride (PROSCAR) 5 mg tablet Take 5 mg by mouth once daily.      fluticasone propionate (FLONASE) 50 mcg/actuation nasal spray 1 spray by Each Nostril route once daily.      latanoprost 0.005 % ophthalmic solution Place into " both eyes every evening.      losartan (COZAAR) 100 MG tablet Take 100 mg by mouth once daily.      multivitamin (THERAGRAN) per tablet Take 1 tablet by mouth once daily.      prednisoLONE acetate (PRED FORTE) 1 % DrpS Place 1 drop into both eyes every evening.      risperiDONE (RISPERDAL) 1 MG tablet Take 1 mg by mouth once daily.      risperiDONE (RISPERDAL) 2 MG tablet Take 2 mg by mouth nightly.      tamsulosin (FLOMAX) 0.4 mg Cap Take 0.4 mg by mouth every evening.      timolol maleate 0.5% (TIMOPTIC) 0.5 % Drop Place 1 drop into both eyes 2 (two) times daily.      zinc sulfate (ZINCATE) 50 mg zinc (220 mg) capsule Take 220 mg by mouth once daily.      megestroL (MEGACE) 400 mg/10 mL (40 mg/mL) Susp Take 800 mg by mouth Daily.         Please address this information as you see fit.  Feel free to contact us if you have any questions or require assistance.    Kennedy Washington, PharmD  [phone]                  .

## 2022-09-28 NOTE — PLAN OF CARE
Recommendation:  1. Change TF to Glucerna 1.5. Initiate at 10ml/hr and advance as tolerated to goal rate of 55ml/hr which would provide 1980 kcal, 108g protein, & 1001ml free water.   2. Monitor weight/labs.   3. RD to follow to monitor TF tolerance    Goals:   TF to meet at least 85% EEN/EPN by RD follow up  Nutrition Goal Status: new

## 2022-09-28 NOTE — PT/OT/SLP EVAL
"Speech Language Pathology Evaluation  Bedside Swallow    Patient Name:  Adalberto Medina   MRN:  [id number]  Admitting Diagnosis: Hypernatremia    Recommendations:                 General Recommendations:  re-assess with PO as tolerated, rec:  Palliative and GI consult to discuss long term goals of care  Diet recommendations:  Pleasure Feeding (pureed for comfort), Clears   Aspiration Precautions: daily and frequent oral care, pleasure feeds with sips of water as able   General Precautions: Standard, fall, aspiration  Communication strategies:  yes/no questions only    History per MD        Chief Complaint      Altered Mental Status (Pt presents to ED today via [facility] for AMS x several days, possible aspiration pneumonia, and failure to thrive /Daughter consulting with pt's physician's for PEG tube placement )  AMS for 2-3 days      Subjective:      History of Present Illness:  Adalberto Medina is a 82 y.o. male with h/o HTN, recent subsegmental PE, legal blindness, T2DM, depression, and BPH who presented to [facility] on 9/27/2022 with a primary complaint of altered mental status.      Limited history able to be obtained from pt as he is unable to effectively answer questions or communicate outside of nodding head and occasional short phrases. Per chart review, pt with recent hospitalization earlier this month with similar presentation of encephalopathy in addition to hypoxia at that time. He was dx with a PE. AMS worked up without definitive answer and per notes, pt with minimal improvement in mental status and physical exam on discharge was "somnolent" and "Aox1 person. Requiring stimulation to rouse. Unable to respond to questions and commands this am."  Per those notations, prior to that admission he was A&Ox4.      Daughter initially able to speak with ED physician stating that he was A&O to person/place/time and most recently at his baseline able to discuss the [place] game as of this past " "Sunday. She did note that he has had significantly decreased po intake due to dysphagia and previously his ACP docs included that he did not want a PEG tube. Reported paperwork from [facility] concern change in mental status and possible aspiration PNA. ROS unable to be obtained due to pt's mental status.      In the ED, vitals significant for initial low BP then inc with good O2 saturation on RA. Labs significant for hypernatremia to 155, elevated troponin to 0.042. CT head negative for acute intracranial abnormality other than R cerumen impaction (vs soft tissue mass). CXR with elevation of R hemidiaphragm (stable from prior), severe emphysematous changes, and no focal consolidation. EKG with stable LBBB and PACs. He received 1L NS and 1L D5 as well as 1g Rocephin.            Past Medical History:   Diagnosis Date    Essential hypertension     Legal blindness     Preglaucoma     Type 2 diabetes mellitus        No past surgical history on file.    Social History: Patient lives at [facility] home,dependent for ADLs.    Prior Intubation HX:  not on this admit     Modified Barium Swallow: none per EMR    Chest X-Rays:  Severe COPD changes.  No cardiomegaly or acute pleural abnormality.     CT: No acute abnormality.   Prior diet: pureed/thin when last seen on acute floor here in early sept 2022.    Subjective     Consult received for clinical swallow eval this date, SLP did communicate with RN prior to eval/treat.    Patient goals: "to be comfortable."     Pain/Comfort:  Pain Rating 1:  (does not indicate pain)    Respiratory Status: room air     Objective:   Pt seen in room, pt found semi-upright, he is contracted and lips/oral cavity appear dry and flaky.  Minimal oral motor movements noted.  Pt with low volume and voice is hoarse when asked to respond to simple questions.   Pt with 1-2 word responses only.  Pt with significant signs of dehydration around mouth/lips and tongue appears coated.     Oral Musculature " Evaluation  Oral Musculature: general weakness, unable to assess due to poor participation/comprehension  Dentition: edentulous  Secretion Management: problems swallowing secretions, oral holding, coughing on secretions  Mucosal Quality: dry, coated tongue  Volitional Cough: weal cough noted  Volitional Swallow: VERY delayed swallow  Voice Prior to PO Intake: low volume, hoarse quality noted  Oral Musculature Comments: generalized weakness    Bedside Swallow Eval:   Consistencies Assessed:  Thin liquids ice chips, water by tsp and unable to take liquids by straw     Pureed- not given due to delay when swallowing     Oral Phase:   Anterior loss  Decreased closure around utensil  Oral residue  Slow oral transit time    Pharyngeal Phase:   decreased hyolaryngeal excursion to palpation  delayed swallow initation  multiple spontaneous swallows    Treatment: Pt familiar to this service from prior admits, nutrition and FTT has been a chronic issue. Pt has been admitted to hospital at least 4 times since April due to poor nutritional intake. Per bedside assessment, pt may have water or pureed trials for comfort with strict implementation of swallow precautions. HE NEEDS TO BE AWAKE and ABLE to accept.   He remains at risk for dysphagia due to oral holding and delay in pharyngeal swallow. Primary MD team to discuss non oral means of feeding with dtr as pt with continued risk for malnutrition.   Do not feel that pt is a functional eater and cannot sustain life on oral diet.     Assessment:     Adalberto Medina is a 82 y.o. male admitted with Hypernatremia who presents with an SLP diagnosis of continued risk for FTT, dehydration and malnutrition which will impact his overall QOL. Pt remains at continued risk for repeat hospitalizations given his current medical status.   Secure chat sent to primary MD  team with above concerns.     Goals:   Multidisciplinary Problems       SLP Goals       Not on file                    Plan:      Patient to be seen:  2 x/week   Plan of Care expires:  10/27/22  Plan of Care reviewed with:  patient   SLP Follow-Up:  Yes       Discharge recommendations:  nursing facility, basic   Barriers to Discharge:   consistent nutritional source    Time Tracking:     SLP Treatment Date:   09/28/22  Speech Start Time:  0939  Speech Stop Time:  0953     Speech Total Time (min):  14 min    Billable Minutes: Eval Swallow and Oral Function 14    09/28/2022

## 2022-09-28 NOTE — PLAN OF CARE
Problem: Adult Inpatient Plan of Care  Goal: Plan of Care Review  Outcome: Ongoing, Progressing    VN cued into room to complete admit assessment. VIP model introduced; VN working alongside bedside treatment team.  Plan of care reviewed with patient. Patient informed of fall risk, fall precautions, call light within reach, side rails x2 elevated. Patient notified to ask staff for assistance. Will continue to monitor and intervene as needed. Chart review complete.

## 2022-09-28 NOTE — ED NOTES
Vss, pt in no acute distress prior to discharge.   Diaper is clean at this time  Pt connected to portable monitor.   Breathing E/U

## 2022-09-28 NOTE — PLAN OF CARE
Problem: Occupational Therapy  Goal: Occupational Therapy Goal  Outcome: Met   OT eval and discharge. PTO, pt was bed bound. He presents with UE and LE flexion contractures, rigid tone, sacral and B feet wounds and DEP with self care which is not change from his baseline. Pt  found in bed with PRB(s) in place, NG tube and body windswept to right in bed. Pt. minimally vocalizing in low whisper, somnolent and Ox person only. Pt. provided with wedge for offloading pressure relief to sacral area in left side line position in bed. No further OT needs identified and will DC acute OT. Recommend pt discharge back to NH longterm care.

## 2022-09-28 NOTE — PLAN OF CARE
Problem: Physical Therapy  Goal: Physical Therapy Goal  Outcome: Unable to Meet, Plan Revised     Per chart pt is dependent and bed bound at baseline. Pt is a NH resident, called to [facility] but unable to reach nurse on 3 attempts. Pt required total A x 2 for repositioning in bed. Was able to follow command to squeeze hand minimally but otherwise unable to assist with any UE/LE movements or mobility. Significant BLE contractions with LEs windswept to the right. No further IP PT needs, d/c PT and recommend pt return with 24/7 care upon d/c.

## 2022-09-28 NOTE — PLAN OF CARE
SW sent updated clinicals via [employer].        09/28/22 1519   Post-Acute Status   Post-Acute Authorization Placement   Post-Acute Placement Status Set-up Complete/Auth obtained   Discharge Plan   Discharge Plan A Return to nursing home     Future Appointments   Date Time Provider Department Center   10/4/2022  9:30 AM Luis A Adam MD [facility]       [facility]  [place] Case Management  [phone]

## 2022-09-28 NOTE — H&P
"\[facility]\"" Internal Medicine H&P    Admitting Team: \[facility]\"" Hospital Medicine B  Attending Physician: Chandana Joyce MD  Resident: Kathy/Stephanie    Date of Admit: 9/27/2022    Chief Complaint     Altered Mental Status (Pt presents to ED today via [facility] for AMS x several days, possible aspiration pneumonia, and failure to thrive /Daughter consulting with pt's physician's for PEG tube placement )  AMS for 2-3 days     Subjective:      History of Present Illness:  Adalberto Medina is a 82 y.o. male with h/o HTN, recent subsegmental PE, legal blindness, T2DM, depression, and BPH who presented to [facility] on 9/27/2022 with a primary complaint of altered mental status.     Limited history able to be obtained from pt as he is unable to effectively answer questions or communicate outside of nodding head and occasional short phrases. Per chart review, pt with recent hospitalization earlier this month with similar presentation of encephalopathy in addition to hypoxia at that time. He was dx with a PE. AMS worked up without definitive answer and per notes, pt with minimal improvement in mental status and physical exam on discharge was "somnolent" and "Aox1 person. Requiring stimulation to rouse. Unable to respond to questions and commands this am."  Per those notations, prior to that admission he was A&Ox4.     Daughter initially able to speak with ED physician stating that he was A&O to person/place/time and most recently at his baseline able to discuss the [place] game as of this past Sunday. She did note that he has had significantly decreased po intake due to dysphagia and previously his ACP docs included that he did not want a PEG tube. Reported paperwork from [facility] concern change in mental status and possible aspiration PNA. ROS unable to be obtained due to pt's mental status.     In the ED, vitals significant for initial low BP then inc with good O2 saturation on RA. Labs significant for hypernatremia " to 155, elevated troponin to 0.042. CT head negative for acute intracranial abnormality other than R cerumen impaction (vs soft tissue mass). CXR with elevation of R hemidiaphragm (stable from prior), severe emphysematous changes, and no focal consolidation. EKG with stable LBBB and PACs. He received 1L NS and 1L D5 as well as 1g Rocephin.     Past Medical History:  Past Medical History:   Diagnosis Date    Essential hypertension     Legal blindness     Preglaucoma     Type 2 diabetes mellitus    PE on Eliquis   BPH  Depression     Past Surgical History:  No past surgical history on file.    Allergies:  Review of patient's allergies indicates:  No Known Allergies    Home Medications, going off of pt's most recent admission and discharge a few weeks ago, will need to verify in am as pt unable to verify:   Eliquis 5mg BID - new med on discharge from 9/4   Lexapro 5mg daily - new med on discharge from 9/4   Tylenol 500mg BID   Atorvastatin 20mg qhs   Finasteride 5mg daily   Tamsuosin 0.4mg qhs   Losartan 100mg daily   Megestrol 800mg daily   Risperdone 1mg daily   Risperdone 2mg nightly   Multivitamin daily   Zinc 220mg daily   Flonase 1 spray b/l nares daily   Brimonidine 0.1% - place 1 drop into both eyes BID   Dorzolamide 2% - place 1 drop into both eyes BID   Latanoprost 0.005% - place into both eyes every evening   Prednisolone 1% drops - place 1 drop into both eyes every evening   Timolol 0.5% - place 1 drop into both eyes BID        Family History:  No family history on file.    Social History:  Tobacco: Reported in chart as former smoker, unsure when and how much   ETOH: unknown   Rec drugs:unknown     Review of Systems:  Review of systems not obtained due to patient factors mental status.     Health Maintaince :   Primary Care Physician: Primary Doctor No Lives at [facility]     Immunizations:   TDap 2005, due  Flu needs 2022  Pna UTD  COVID Needs 2nd booster     Cancer Screening:  Pt unable to communicate  "answers      Objective:   Last 24 Hour Vital Signs:  BP  Min: 118/66  Max: 184/82  Temp  Av °F (36.7 °C)  Min: 98 °F (36.7 °C)  Max: 98 °F (36.7 °C)  Pulse  Av  Min: 96  Max: 103  Resp  Av.3  Min: 14  Max: 18  SpO2  Av.2 %  Min: 97 %  Max: 99 %  There is no height or weight on file to calculate BMI.  No intake/output data recorded.    Physical Examination:  General:  Cachectic appearing elderly gentleman, Laying on R side in bed with eyes half open, intermittently responding by nodding head, able to speak one phrase ("I can't see") during interview  HEENT:  Temporal wasting, PERRL, no discharge from either eye, no scleral icterus, R ear with abrasion/wound with bandage, no nasal discharge, dry mucous membranes   Neck: no cervical LAD, unable to move neck side to side when asked but shakes head no to pain  Respiratory: No increased WOB, symmetrical chest rise, no wheezing or rhonchi   Cardiovascular: Normal rate, regular rhythm and normal heart sounds with no murmurs, rubs, or gallops, pulses 2+ and symmetric throughout, no LE edema  Abdominal: Soft, nontender, scaphoid abdomen  Musculoskeletal: Able to lift b/l hands against gravity, no effort to move b/l LE when prompted   Neurological: sleepy but wakes to loud voice or noxious stimuli, shakes head no when asked if he can state his name or where he is, no appreciable focal cranial nerve deficits but difficult to assess due to mentation and pt cooperation, able to lift b/l upper extremities to gravity and 5/5 b/l hand strength,   Extremities and Skin: Skin is warm and dry, stage 3 sacral wound, abrasion to R ear, b/l feet wrapped in guaze     Laboratory:  Recent Results (from the past 24 hour(s))   Procalcitonin    Collection Time: 22  6:11 PM   Result Value Ref Range    Procalcitonin 0.09 <0.25 ng/mL   Troponin I    Collection Time: 22  6:11 PM   Result Value Ref Range    Troponin I 0.042 (H) 0.000 - 0.026 ng/mL   Brain natriuretic " peptide    Collection Time: 09/27/22  6:11 PM   Result Value Ref Range    BNP 44 0 - 99 pg/mL   Urinalysis, Reflex to Urine Culture Urine, Clean Catch    Collection Time: 09/27/22  6:47 PM    Specimen: Urine   Result Value Ref Range    Specimen UA Urine, Clean Catch     Color, UA Yellow Yellow, Straw, Shiela    Appearance, UA Hazy (A) Clear    pH, UA 6.0 5.0 - 8.0    Specific Gravity, UA 1.025 1.005 - 1.030    Protein, UA 1+ (A) Negative    Glucose, UA Negative Negative    Ketones, UA 1+ (A) Negative    Bilirubin (UA) Negative Negative    Occult Blood UA Negative Negative    Nitrite, UA Negative Negative    Urobilinogen, UA 2.0-3.0 (A) <2.0 EU/dL    Leukocytes, UA Trace (A) Negative   Urinalysis Microscopic    Collection Time: 09/27/22  6:47 PM   Result Value Ref Range    RBC, UA 4 0 - 4 /hpf    WBC, UA 4 0 - 5 /hpf    Bacteria None None-Occ /hpf    Squam Epithel, UA 1 /hpf    Hyaline Casts, UA 4 (A) 0-1/lpf /lpf    Microscopic Comment SEE COMMENT    POCT COVID-19 Rapid Screening    Collection Time: 09/27/22  6:48 PM   Result Value Ref Range    POC Rapid COVID Negative Negative     Acceptable Yes    Comprehensive metabolic panel    Collection Time: 09/27/22  8:19 PM   Result Value Ref Range    Sodium 155 (H) 136 - 145 mmol/L    Potassium 4.4 3.5 - 5.1 mmol/L    Chloride 121 (H) 95 - 110 mmol/L    CO2 23 23 - 29 mmol/L    Glucose 101 70 - 110 mg/dL    BUN 30 (H) 8 - 23 mg/dL    Creatinine 1.0 0.5 - 1.4 mg/dL    Calcium 9.1 8.7 - 10.5 mg/dL    Total Protein 6.6 6.0 - 8.4 g/dL    Albumin 2.5 (L) 3.5 - 5.2 g/dL    Total Bilirubin 0.7 0.1 - 1.0 mg/dL    Alkaline Phosphatase 102 55 - 135 U/L    AST 25 10 - 40 U/L    ALT 16 10 - 44 U/L    Anion Gap 11 8 - 16 mmol/L    eGFR >60 >60 mL/min/1.73 m^2   Magnesium    Collection Time: 09/27/22  8:19 PM   Result Value Ref Range    Magnesium 1.8 1.6 - 2.6 mg/dL   CBC Without Differential    Collection Time: 09/27/22  8:20 PM   Result Value Ref Range    WBC 7.52 3.90 -  12.70 K/uL    RBC 4.14 (L) 4.60 - 6.20 M/uL    Hemoglobin 11.4 (L) 14.0 - 18.0 g/dL    Hematocrit 37.5 (L) 40.0 - 54.0 %    MCV 91 82 - 98 fL    MCH 27.5 27.0 - 31.0 pg    MCHC 30.4 (L) 32.0 - 36.0 g/dL    RDW 16.1 (H) 11.5 - 14.5 %    Platelets 261 150 - 450 K/uL    MPV 10.1 9.2 - 12.9 fL       Microbiology Data:  Blood Culture, Routine   Date Value Ref Range Status   09/02/2022 No growth after 5 days.  Final   09/02/2022 No growth after 5 days.  Final       Other Results:  EKG (my interpretation): unchanged from previous tracings, stable LBBB and PACs.    Radiology:  CT Head Without Contrast, 9/27/2022:  FINDINGS: Intracranial compartment: Ventricles and sulci are stable in size for age without evidence of hydrocephalus. No extra-axial blood or fluid collections. The brain parenchyma appears stable.  No parenchymal mass, hemorrhage, edema or major vascular distribution infarct. Skull/extracranial contents (limited evaluation): No fracture. Mastoids remain clear.  Air-fluid interface is within the sphenoid sinuses are again seen.  Soft tissue filling the external auditory canal on the right greater than left is again noted.     No acute abnormality. Stable sphenoid sinus disease and external auditory canal soft tissue.  Correlate for impacted cerumen versus epithelial mass. This report was flagged in Epic as abnormal.        CTA Chest Non-Coronary (PE Study), 9/1/2022:  FINDINGS: There are small pulmonary artery filling defects noted on the left, at the left lung base anteriorly as well as posteriorly, as seen on axial images 281 through 307, series 2 anteriorly, and axial images 316 through 322 posteriorly at the left base.  These are consistent with pulmonary emboli.  There is no large central saddle type pulmonary embolus. The thoracic aorta demonstrates appropriate opacification.  Atherosclerotic change noted.  There is elevation of the right hemidiaphragm.  The lungs demonstrate prominent chronic appearing  change, there are bandlike areas of scarring, there are chronic interstitial changes.  There are bronchiectatic changes noted.  There are some bronchi that demonstrate opacity, these may relate to chronic changes however may relate to secretions or mucous plugging.  There are areas of opacity extending from the right suprahilar region superiorly, and to a lesser extent from the left hilum, these are thought likely sequelae of chronic change, however follow-up is recommended. There is no evidence for pleural effusion and there is no evidence for pneumothorax. Limited imaging of the upper abdomen demonstrates no evidence for acute upper abdominal process. The osseous structures demonstrate chronic change with diminished mineralization and degenerative change.     IMPRESSION: Findings consistent with small vessel acute pulmonary emboli on the left, as discussed above. Chronic appearing lung changes as discussed above, additional areas of opacity may relate to chronic change however follow-up is recommended. The findings were reported to Dr. Box in the ER at the time of dictation. This report was flagged in Epic as abnormal.     X-Ray Chest AP Portable, 9/27/22:   FINDINGS: Patient is rotated to the right limiting evaluation of the right hilum which is relatively prominent as noted on prior CT.  The cardiomediastinal silhouette is stable and not significantly enlarged.  There is tortuosity of the aorta and atherosclerotic calcification also noted. Severe emphysematous changes within both lungs are seen with volume loss on the right and shift of the mediastinum to the right.  Stable band like areas of scarring or chronic atelectasis are noted.  Reticulonodular chronic opacities are also seen particularly in the right perihilar region and lower lung limiting evaluation for new small nodular foci.  There is no evidence of acute pleural effusion or pneumothorax.  Osseous structures grossly appear intact.      IMPRESSION: Essentially stable chest x-ray as compared to the prior exam with chronic reticulonodular opacities and relative prominence of the right hilum in this patient with volume loss and mediastinal shift to the right. Severe COPD changes.  No cardiomegaly or acute pleural abnormality. Consideration for short-term nonemergent follow-up chest CT/PET CT suggested to document stability of right hilar prominent soft tissues and nodular opacities as seen on CTA chest 09/01/2022 to exclude the possibility of neoplastic process in this patient with COPD.        US Lower Extremity Veins Bilateral, 9/1/2022:  Limited exam due to difficulties in patient positioning.  Probable deep venous thrombosis at the origin of the left peroneal vein.     Echo, 9/1/2022:  · The left ventricle is normal in size with normal systolic function. · The estimated ejection fraction is 65%. · Normal left ventricular diastolic function. · Normal right ventricular size with normal right ventricular systolic function. · Normal central venous pressure (3 mmHg).         Assessment:     Adalberto Medina is a 82 y.o. male with h/o HTN, recent subsegmental PE, legal blindness, T2DM, depression, and BPH who presented to [facility] on 9/27/2022 with a primary complaint of altered mental status found to be dehydrated and hypernatremic.      Plan:     Acute (on chronic?) Encephalopathy:  - Most likely in setting of hyperNa   - Recent meds started were eliquis and lexapro (more likely to cause hypoNa/SIADH)  - CT head without evidence of infarct or bleed, possible impacted cerumen vs soft tissue mass   - Pt with similar presentation a few weeks ago, waxing and waning mental status, per last discharge pt was lethargic and A&Ox1 but per daughter's report to ED physician more with it   - Will get further collateral with dtr in am in addition to treating hyperNa      Hypernatremia and Dehydration 2/2 Dec Po Intake:   Failure to Thrive:   -  Pt reportedly not eating at [facility] and issues with dysphagia during last admit   - S/p 1L NS and 1L d5 in the ED, recheck BMP as pt has 2.2L free water deficit (to get to 145 Na) on admission   - NG for enteral replacement in pt with encephalopathy pending speech eval   - ST consult   - Aim to correct Na by 10 in 24 hours - goal 145 by tomorrow evening   - Will check BMP and make adjustments - anticipate enteral repletion for mIVF in addition to D5/water to correct free water deficit     Elevated Troponin:  - EKG stable with known LBBB  - Repeat trop to trend, most likely demand in setting of dehydration and poor po intake     PE:  - Continue Eliquis 5mg BID  - Recently dx at last hospital stay     HTN:  - BP initially low but inc after fluids  - Home meds include losartan 100mg daily, will plan to restart in am if BP remains elevated     Severe Malnutrition:  - Pt unable to adequately take po for nourishment, will discuss with dtr in am as per pt's prior ACP docs he did not want PEG but dtr stated today that they did want PEG tube   - NG tube placement and initiate tube feeds  - Nutrition consult for optimization     T2DM:  - POCT glucose checks q4 hrs   - Initiate enteral feeds and follow labs for refeeding   - nutrition consult     Legally Blind  Depression:  - Cont home lexapro     BPH:  - Continue home flomax and proscar     Wounds, present on admission  - Consult wound care   - Frequent turns    Diet: Tube feeds via NG   DVT PPX: Eliquis   Dispo: Pending improvement in dehydration/hyperNa and nutrition     Code Status: DNR        Fay Mejia MD  [facility] HO-IV  09/27/2022 11:37 PM    [facility] Hospitalist Pager numbers:   [facility] Hospitalist Medicine Team A (Petra/Demetrio): [phone]  [facility] Hospitalist Medicine Team B (Isiah/Ranjana):  [phone]

## 2022-09-28 NOTE — CONSULTS
"  [facility] - Telemetry  Adult Nutrition  Consult Note    SUMMARY     Recommendations    Recommendation:  1. Change TF to Glucerna 1.5. Initiate at 10ml/hr and advance as tolerated to goal rate of 55ml/hr which would provide 1980 kcal, 108g protein, & 1001ml free water.   2. Monitor weight/labs.   3. RD to follow to monitor TF tolerance    Goals:   TF to meet at least 85% EEN/EPN by RD follow up  Nutrition Goal Status: new  Communication of RD Recs: reviewed with RN    Assessment and Plan  Failure to thrive in adult  Contributing Nutrition Diagnosis  Inadequate energy intake    Related to (etiology):   dyasphagia    Signs and Symptoms (as evidenced by):   NPO, NG tube placed and trickle TF started     Interventions:  Collaboration with other providers  Modify enteral nutrition rate    Nutrition Diagnosis Status:   New      Malnutrition Assessment  Unable to assess NFPE at visit 2/2 pt asleep      Reason for Assessment  Reason For Assessment: consult (TF recs, nutl assessment)  Diagnosis:  (hypernatremia)  Relevant Medical History: legal blindness, DM, HTN  General Information Comments: Pt NPO at this time. Noted SLP eval/recs. NG tube placed. TF of Impact Peptide 1.5 started at 10ml/hr with beneprotein. Noted family requests PEG tube. Lico 10- R ear, coccyx, R hip and B feet wounds. Pt admitted from [facility] with AMS/dysphagia. Per graph, pt with 43lb weight loss x 1 month?? Unable to assess NFPE at visit 2/2 pt sleeping.  Nutrition Discharge Planning: pt to d/c on TF    Nutrition Risk Screen  Nutrition Risk Screen: dysphagia or difficulty swallowing, tube feeding or parenteral nutrition    Nutrition/Diet History  Food Preferences: no [employer] or cultural food prefs identified  Factors Affecting Nutritional Intake: NPO, difficulty/impaired swallowing    Anthropometrics  Temp: 98.6 °F (37 °C)  Height Method: Stated  Height: 5' 11" (180.3 cm)  Height (inches): 71 in  Weight Method: Bed Scale  Weight: 55.6 " kg (122 lb 9.2 oz)  Weight (lb): 122.58 lb  Ideal Body Weight (IBW), Male: 172 lb  % Ideal Body Weight, Male (lb): 71.27 %  BMI (Calculated): 17.1  BMI Grade: 17 - 18.4 protein-energy malnutrition grade I  Usual Body Weight (UBW), k.8 kg ()  % Usual Body Weight: 74.49  % Weight Change From Usual Weight: -25.67 %     Lab/Procedures/Meds  Pertinent Labs Reviewed: reviewed  Pertinent Labs Comments: Na 155H, BUN 27H, Glu 137H, Phos 2.1L, Alb 2.5L  Pertinent Medications Reviewed: reviewed  Pertinent Medications Comments: 5% dex at 125    Estimated/Assessed Needs  Weight Used For Calorie Calculations: 55.6 kg (122 lb 9.2 oz)  Energy Calorie Requirements (kcal): 1946 (35 kcal/kg)  Energy Need Method: Kcal/kg  Protein Requirements: 83g (1.5g/kg)  Weight Used For Protein Calculations: 55.6 kg (122 lb 9.2 oz)  Estimated Fluid Requirement Method: RDA Method  RDA Method (mL):   CHO Requirement: 225g    Nutrition Prescription Ordered  Current Diet Order: NPO  Current Nutrition Support Formula Ordered: Impact Peptide 1.5  Current Nutrition Support Rate Ordered: 10 (ml)  Current Nutrition Support Frequency Ordered: ml/hr    Evaluation of Received Nutrient/Fluid Intake  Enteral Calories (kcal): 360  Enteral Protein (gm): 22  Enteral (Free Water) Fluid (mL): 184  % Kcal Needs: 18  % Protein Needs: 26  I/O: 240/0  Energy Calories Required: not meeting needs  Protein Required: not meeting needs  Fluid Required: not meeting needs  Comments: LBM 9/28  % Intake of Estimated Energy Needs: 0 - 25 %  % Meal Intake: NPO    Nutrition Risk  Level of Risk/Frequency of Follow-up:  (2xweekly)     Monitor and Evaluation  Food and Nutrient Intake: energy intake  Food and Nutrient Adminstration: enteral and parenteral nutrition administration  Physical Activity and Function: nutrition-related ADLs and IADLs  Anthropometric Measurements: weight  Biochemical Data, Medical Tests and Procedures: electrolyte and renal  panel  Nutrition-Focused Physical Findings: overall appearance     Nutrition Follow-Up  RD Follow-up?: Yes

## 2022-09-28 NOTE — ED NOTES
Report given to Esthela DURBIN, states ready to accept pt.   XRAY Deo confirmed NG tube placement.   Pt placed on portable tele monitor. Pt in no acute distress at this time, VSS  Breathing E/U

## 2022-09-28 NOTE — ED NOTES
Pt cleaned and wiped with warm wipes.   Pt repositioned for comfort.   Pt conveyed feeling cold. Warm blankets provided behind pt on bed and on top of pt.   VSS. Pt in view from nurses station.

## 2022-09-28 NOTE — PLAN OF CARE
Pt familiar to this service from prior admits, nutrition and FTT has been a chronic issue. Pt has been admitted to hospital at least 4 times since April due to poor nutritional intake. Per bedside assessment, pt may have water or pureed trials for comfort with strict implementation of swallow precautions. HE NEEDS TO BE AWAKE and ABLE to accept.   He remains at risk for dysphagia due to oral holding and delay in pharyngeal swallow. Primary MD team to discuss non oral means of feeding with dtr as pt with continued risk for malnutrition.

## 2022-09-28 NOTE — PT/OT/SLP EVAL
Occupational Therapy   Evaluation and Discharge Note    Name: Adalberto Medina  MRN: [id number]  Admitting Diagnosis:  Hypernatremia   Recent Surgery: * No surgery found *    The primary encounter diagnosis was Acute encephalopathy. Diagnoses of Altered mental status, Hypernatremia, Elevated troponin, Impacted cerumen of right ear, Failure to thrive in adult, Diabetes mellitus due to underlying condition with hyperosmolarity without coma, without long-term current use of insulin, Mixed hyperlipidemia, Depression, unspecified depression type, and Wound of sacral region, subsequent encounter were also pertinent to this visit.    Recommendations:     Discharge Recommendations: nursing facility, basic  Discharge Equipment Recommendations:   none  Barriers to discharge:  None    Assessment:     Adalberto Medina is a 82 y.o. male with a medical diagnosis of Hypernatremia. At this time, patient is functioning at their prior level of function and does not require further acute OT services.     OT eval and discharge. PTO, pt was bed bound. He presents with UE and LE flexion contractures, rigid tone, sacral and B feet wounds and DEP with self care which is not change from his baseline. Pt  found in bed with PRB(s) in place, NG tube and body windswept to right in bed. Pt. minimally vocalizing in low whisper, somnolent and Ox person only. Pt. provided with wedge for offloading pressure relief to sacral area in left side line position in bed. No further OT needs identified and will DC acute OT. Recommend pt discharge back to NH alf care.         Plan:     During this hospitalization, patient does not require further acute OT services.  Please re-consult if situation changes.    Plan of Care Reviewed with: patient    Subjective     Chief Complaint: pain with joint ROM due to contractures  Patient/Family Comments/goals: none    Occupational Profile:  Living Environment: pt is a resident of [facility]  Previous level of function: DEP  "all care, bed bound  Roles and Routines: sedentary  Equipment Used at home:  hospital bed  Assistance upon Discharge: staff at NH    Pain/Comfort:  Pain Rating 1:  ('ouch" with PROM UE/LE)  Location - Side 1: Bilateral  Location - Orientation 1: generalized  Location 1: shoulder (knees, elbow joints)  Pain Addressed 1: Reposition, Cessation of Activity  Pain Rating Post-Intervention 1: 0/10    Patients cultural, spiritual, [employer] conflicts given the current situation: no    Objective:     Communicated with: nurse prior to session.  Patient found  legs windswept to right  with bed alarm, Condom Catheter, pressure relief boots, peripheral IV, NG tube, telemetry upon OT entry to room.    General Precautions: Standard, fall, diabetic, aspiration   Orthopedic Precautions:N/A   Braces: N/A  Respiratory Status: Room air     Occupational Performance:    Bed Mobility:    Patient completed Rolling/Turning to Left with  dependent and 2 persons  Patient completed Rolling/Turning to Right with dependent and 2 persons  Patient completed Scooting/Bridging with dependent and 2 persons    Activities of Daily Living:  Feeding:  dependence and NG tube   Grooming: dependence and all tasks   Upper Body Dressing: dependence .  Lower Body Dressing: dependence .  Toileting: dependence and condom cathter, adult brief     Cognitive/Visual Perceptual:  Cognitive/Psychosocial Skills:     -       Oriented to: Person   -       Follows Commands/attention:Inattentive and followed few 1 step commands" squeeze my hand"  -       Communication: low voice quality, spoke in whisper, once word responses when asked questions, appropriate responses  -       Memory: NT  -       Safety awareness/insight to disability: impaired   -       Mood/Affect/Coping skills/emotional control: Lethargic  Visual/Perceptual:      -Impaired  tracking, saccades, acuity, and hx blindness; R eye fixed gaze to right; l eye staring straight- elicited a blink reflex  "     Physical Exam:  Balance:    -       poor in bed  Postural examination/scapula alignment:    -       Rounded shoulders  -       Forward head  Skin integrity: Wound sacral with mepilex, B feet with dressings and PRB  Motor Planning:    -       impaired  Upper Extremity Range of Motion:     -       Right Upper Extremity: Deficits: no AROM noted in shld or elbow, minimal R finger flex/ext; PROM shld flexion ~30, abd ~30 resistive to passive movement, rigid tone; shld and elbow flexion contracture; PROM elbow extension ~90 with resistance  -       Left Upper Extremity: Deficits: no AROM note, minimal finger flex/ext L hand; PROM shld ~20, abd ~20, heavy resistance and high tone rigidity; elbow flex WFL; ext 90  Upper Extremity Strength:    -       Right Upper Extremity: Deficits:  NT no AROM  -       Left Upper Extremity: Deficits: same as above   Strength:    -       Right Upper Extremity: Deficits: poor  -       Left Upper Extremity: Deficits: poor  Neurological:    -       rigid tone    AMPAC 6 Click ADL:  AMPAC Total Score: 6    Treatment & Education:  Purpose of OT and POC  PROM BUE with heavy resistance and high tone  Bed positioning for positioning :Pt. provided with wedge for offloading pressure relief to sacral area in left side line position in bed.    Patient left left sidelying with all lines intact, call button in reach, and bed alarm on    GOALS:   Multidisciplinary Problems       Occupational Therapy Goals       Not on file              Multidisciplinary Problems (Resolved)          Problem: Occupational Therapy    Goal Priority Disciplines Outcome Interventions   Occupational Therapy Goal   (Resolved)     OT, PT/OT Met                        History:     Past Medical History:   Diagnosis Date    Essential hypertension     Legal blindness     Preglaucoma     Type 2 diabetes mellitus        No past surgical history on file.    Time Tracking:     OT Date of Treatment: 09/28/22  OT Start Time:  1408  OT Stop Time: 1421  OT Total Time (min): 13 min cotx with PT due to complex nature of pt and needing 2 skilled therapists    Billable Minutes:Evaluation 13  Total Time 13    9/28/2022

## 2022-09-28 NOTE — ED NOTES
Pt resting comfortably in bed. Side rails raised.   Breathing E/U, pt arouses to voice  IV site CDI, fluids actively infusing. Tegaderm appeared loose, coban applied to reinforce IV site securement.

## 2022-09-28 NOTE — PLAN OF CARE
Problem: Adult Inpatient Plan of Care  Goal: Plan of Care Review  Outcome: Ongoing, Progressing   Pt is alert, oriented to place. Care plan explained to patient, he can't really talk too much. But he can follow the commends.     No nausea/vomiting/diarrhea noted. Pt had bowel movement this morning. Due medications given through NG tube, pt tolerated well.    Initiate fall risk precaution, bed in lowest position, bed alarm on. Call light/personal items in reach. Will continue to monitor.

## 2022-09-29 LAB
ANION GAP SERPL CALC-SCNC: 10 MMOL/L (ref 8–16)
ANION GAP SERPL CALC-SCNC: 11 MMOL/L (ref 8–16)
ANION GAP SERPL CALC-SCNC: 11 MMOL/L (ref 8–16)
ANION GAP SERPL CALC-SCNC: 9 MMOL/L (ref 8–16)
BASOPHILS # BLD AUTO: 0 K/UL (ref 0–0.2)
BASOPHILS NFR BLD: 0 % (ref 0–1.9)
BUN SERPL-MCNC: 20 MG/DL (ref 8–23)
BUN SERPL-MCNC: 21 MG/DL (ref 8–23)
BUN SERPL-MCNC: 21 MG/DL (ref 8–23)
BUN SERPL-MCNC: 22 MG/DL (ref 8–23)
CALCIUM SERPL-MCNC: 8 MG/DL (ref 8.7–10.5)
CALCIUM SERPL-MCNC: 8.2 MG/DL (ref 8.7–10.5)
CALCIUM SERPL-MCNC: 8.2 MG/DL (ref 8.7–10.5)
CALCIUM SERPL-MCNC: 8.5 MG/DL (ref 8.7–10.5)
CHLORIDE SERPL-SCNC: 107 MMOL/L (ref 95–110)
CHLORIDE SERPL-SCNC: 108 MMOL/L (ref 95–110)
CHLORIDE SERPL-SCNC: 108 MMOL/L (ref 95–110)
CHLORIDE SERPL-SCNC: 111 MMOL/L (ref 95–110)
CO2 SERPL-SCNC: 26 MMOL/L (ref 23–29)
CO2 SERPL-SCNC: 26 MMOL/L (ref 23–29)
CO2 SERPL-SCNC: 27 MMOL/L (ref 23–29)
CO2 SERPL-SCNC: 29 MMOL/L (ref 23–29)
CREAT SERPL-MCNC: 0.8 MG/DL (ref 0.5–1.4)
DIFFERENTIAL METHOD: ABNORMAL
EOSINOPHIL # BLD AUTO: 0.1 K/UL (ref 0–0.5)
EOSINOPHIL NFR BLD: 0.8 % (ref 0–8)
ERYTHROCYTE [DISTWIDTH] IN BLOOD BY AUTOMATED COUNT: 15.9 % (ref 11.5–14.5)
EST. GFR  (NO RACE VARIABLE): >60 ML/MIN/1.73 M^2
GLUCOSE SERPL-MCNC: 120 MG/DL (ref 70–110)
GLUCOSE SERPL-MCNC: 126 MG/DL (ref 70–110)
GLUCOSE SERPL-MCNC: 144 MG/DL (ref 70–110)
GLUCOSE SERPL-MCNC: 157 MG/DL (ref 70–110)
HCT VFR BLD AUTO: 28.7 % (ref 40–54)
HGB BLD-MCNC: 8.7 G/DL (ref 14–18)
IMM GRANULOCYTES # BLD AUTO: 0.06 K/UL (ref 0–0.04)
IMM GRANULOCYTES NFR BLD AUTO: 0.9 % (ref 0–0.5)
LYMPHOCYTES # BLD AUTO: 1.7 K/UL (ref 1–4.8)
LYMPHOCYTES NFR BLD: 26.4 % (ref 18–48)
MCH RBC QN AUTO: 26.9 PG (ref 27–31)
MCHC RBC AUTO-ENTMCNC: 30.3 G/DL (ref 32–36)
MCV RBC AUTO: 89 FL (ref 82–98)
MONOCYTES # BLD AUTO: 0.4 K/UL (ref 0.3–1)
MONOCYTES NFR BLD: 6.6 % (ref 4–15)
NEUTROPHILS # BLD AUTO: 4.2 K/UL (ref 1.8–7.7)
NEUTROPHILS NFR BLD: 65.3 % (ref 38–73)
NRBC BLD-RTO: 0 /100 WBC
OSMOLALITY UR: 678 MOSM/KG (ref 50–1200)
PHOSPHATE SERPL-MCNC: 2 MG/DL (ref 2.7–4.5)
PHOSPHATE SERPL-MCNC: 2.2 MG/DL (ref 2.7–4.5)
PHOSPHATE SERPL-MCNC: 2.3 MG/DL (ref 2.7–4.5)
PHOSPHATE SERPL-MCNC: 2.3 MG/DL (ref 2.7–4.5)
PLATELET # BLD AUTO: 242 K/UL (ref 150–450)
PMV BLD AUTO: 10.2 FL (ref 9.2–12.9)
POCT GLUCOSE: 113 MG/DL (ref 70–110)
POCT GLUCOSE: 124 MG/DL (ref 70–110)
POCT GLUCOSE: 134 MG/DL (ref 70–110)
POCT GLUCOSE: 162 MG/DL (ref 70–110)
POCT GLUCOSE: 168 MG/DL (ref 70–110)
POTASSIUM SERPL-SCNC: 3.4 MMOL/L (ref 3.5–5.1)
POTASSIUM SERPL-SCNC: 3.6 MMOL/L (ref 3.5–5.1)
POTASSIUM SERPL-SCNC: 4.2 MMOL/L (ref 3.5–5.1)
POTASSIUM SERPL-SCNC: 4.5 MMOL/L (ref 3.5–5.1)
RBC # BLD AUTO: 3.24 M/UL (ref 4.6–6.2)
SODIUM SERPL-SCNC: 144 MMOL/L (ref 136–145)
SODIUM SERPL-SCNC: 145 MMOL/L (ref 136–145)
SODIUM SERPL-SCNC: 145 MMOL/L (ref 136–145)
SODIUM SERPL-SCNC: 149 MMOL/L (ref 136–145)
WBC # BLD AUTO: 6.36 K/UL (ref 3.9–12.7)

## 2022-09-29 PROCEDURE — 25000003 PHARM REV CODE 250: Performed by: STUDENT IN AN ORGANIZED HEALTH CARE EDUCATION/TRAINING PROGRAM

## 2022-09-29 PROCEDURE — 84100 ASSAY OF PHOSPHORUS: CPT | Mod: 91 | Performed by: STUDENT IN AN ORGANIZED HEALTH CARE EDUCATION/TRAINING PROGRAM

## 2022-09-29 PROCEDURE — 11000001 HC ACUTE MED/SURG PRIVATE ROOM

## 2022-09-29 PROCEDURE — 94761 N-INVAS EAR/PLS OXIMETRY MLT: CPT

## 2022-09-29 PROCEDURE — 85025 COMPLETE CBC W/AUTO DIFF WBC: CPT | Performed by: STUDENT IN AN ORGANIZED HEALTH CARE EDUCATION/TRAINING PROGRAM

## 2022-09-29 PROCEDURE — 80048 BASIC METABOLIC PNL TOTAL CA: CPT | Mod: 91 | Performed by: STUDENT IN AN ORGANIZED HEALTH CARE EDUCATION/TRAINING PROGRAM

## 2022-09-29 PROCEDURE — 36415 COLL VENOUS BLD VENIPUNCTURE: CPT | Performed by: STUDENT IN AN ORGANIZED HEALTH CARE EDUCATION/TRAINING PROGRAM

## 2022-09-29 PROCEDURE — 99900035 HC TECH TIME PER 15 MIN (STAT)

## 2022-09-29 PROCEDURE — 63600175 PHARM REV CODE 636 W HCPCS: Performed by: STUDENT IN AN ORGANIZED HEALTH CARE EDUCATION/TRAINING PROGRAM

## 2022-09-29 RX ORDER — MAGNESIUM SULFATE HEPTAHYDRATE 40 MG/ML
2 INJECTION, SOLUTION INTRAVENOUS ONCE
Status: COMPLETED | OUTPATIENT
Start: 2022-09-29 | End: 2022-09-29

## 2022-09-29 RX ORDER — SODIUM,POTASSIUM PHOSPHATES 280-250MG
2 POWDER IN PACKET (EA) ORAL 3 TIMES DAILY
Status: COMPLETED | OUTPATIENT
Start: 2022-09-29 | End: 2022-09-29

## 2022-09-29 RX ORDER — POTASSIUM CHLORIDE 750 MG/1
30 TABLET, EXTENDED RELEASE ORAL ONCE
Status: COMPLETED | OUTPATIENT
Start: 2022-09-29 | End: 2022-09-29

## 2022-09-29 RX ADMIN — TAMSULOSIN HYDROCHLORIDE 0.4 MG: 0.4 CAPSULE ORAL at 09:09

## 2022-09-29 RX ADMIN — POTASSIUM & SODIUM PHOSPHATES POWDER PACK 280-160-250 MG 2 PACKET: 280-160-250 PACK at 09:09

## 2022-09-29 RX ADMIN — MAGNESIUM SULFATE 2 G: 2 INJECTION INTRAVENOUS at 09:09

## 2022-09-29 RX ADMIN — BRIMONIDINE TARTRATE 1 DROP: 1.5 SOLUTION OPHTHALMIC at 09:09

## 2022-09-29 RX ADMIN — TIMOLOL MALEATE 1 DROP: 5 SOLUTION/ DROPS OPHTHALMIC at 09:09

## 2022-09-29 RX ADMIN — POTASSIUM & SODIUM PHOSPHATES POWDER PACK 280-160-250 MG 2 PACKET: 280-160-250 PACK at 03:09

## 2022-09-29 RX ADMIN — APIXABAN 5 MG: 5 TABLET, FILM COATED ORAL at 09:09

## 2022-09-29 RX ADMIN — POTASSIUM CHLORIDE 30 MEQ: 750 TABLET, EXTENDED RELEASE ORAL at 09:09

## 2022-09-29 RX ADMIN — PREDNISOLONE ACETATE 1 DROP: 10 SUSPENSION/ DROPS OPHTHALMIC at 09:09

## 2022-09-29 RX ADMIN — ATORVASTATIN CALCIUM 20 MG: 20 TABLET, FILM COATED ORAL at 09:09

## 2022-09-29 RX ADMIN — FINASTERIDE 5 MG: 5 TABLET, FILM COATED ORAL at 09:09

## 2022-09-29 RX ADMIN — DORZOLAMIDE HYDROCHLORIDE 1 DROP: 20 SOLUTION/ DROPS OPHTHALMIC at 09:09

## 2022-09-29 RX ADMIN — LATANOPROST 1 DROP: 50 SOLUTION OPHTHALMIC at 09:09

## 2022-09-29 RX ADMIN — ESCITALOPRAM OXALATE 5 MG: 5 TABLET, FILM COATED ORAL at 09:09

## 2022-09-29 NOTE — PROGRESS NOTES
"[facility] Internal Medicine Progress Note    Primary Team: [facility] Hospital Medicine B  Attending Physician: Chandana Joyce MD  Resident: Kathy  Intern: Yuval      Subjective:      Pt resting this morning comfortably, denies any pain or discomfort at this moment. NG tube in place and pt able to respond appropriately to interview.      Objective:   Last 24 Hour Vital Signs:  BP  Min: 105/56  Max: 131/59  Temp  Av °F (36.7 °C)  Min: 97.1 °F (36.2 °C)  Max: 98.6 °F (37 °C)  Pulse  Av.5  Min: 81  Max: 108  Resp  Av  Min: 16  Max: 18  SpO2  Av.3 %  Min: 93 %  Max: 99 %  Height  Av' 11" (180.3 cm)  Min: 5' 11" (180.3 cm)  Max: 5' 11" (180.3 cm)  Weight  Av.2 kg (130 lb 8.2 oz)  Min: 55.6 kg (122 lb 9.2 oz)  Max: 62.8 kg (138 lb 7.2 oz)  I/O last 3 completed shifts:  In: 720 [NG/GT:720]  Out: 370 [Urine:370]    Physical Examination:  General:  Cachectic appearing elderly gentleman, somnolent  HEENT:  Temporal wasting, no scleral icterus, R ear with abrasion/wound with bandage, no nasal discharge, dry mucous membranes but improved. B/l ears impacted with cerumen  Respiratory: CTAB, No increased WOB, symmetrical chest rise, no wheezing or rhonchi   Cardiovascular: Normal rate, regular rhythm and normal heart sounds with no murmurs, rubs, or gallops, pulses 2+ and symmetric throughout, no LE edema  Abdominal: Soft, nontender, scaphoid abdomen  Musculoskeletal: Able to lift b/l hands against gravity, no effort to move b/l LE when prompted   Neurological: sleepy but wakes to loud voice or noxious stimuli, shakes head in response to yes/no questions, no appreciable focal cranial nerve deficits but difficult to assess due to mentation and pt cooperation, able to lift b/l upper extremities to gravity and 3/5 b/l hand strength, able to confirm name and name daughter correctly  Extremities and Skin: Skin is warm and dry, stage 3 sacral wound, abrasion to R ear, b/l feet wrapped in guaze "     Laboratory:  Laboratory Data   Recent Results (from the past 12 hour(s))   Basic metabolic panel    Collection Time: 09/29/22 12:01 AM   Result Value Ref Range    Sodium 149 (H) 136 - 145 mmol/L    Potassium 3.6 3.5 - 5.1 mmol/L    Chloride 111 (H) 95 - 110 mmol/L    CO2 29 23 - 29 mmol/L    Glucose 144 (H) 70 - 110 mg/dL    BUN 22 8 - 23 mg/dL    Creatinine 0.8 0.5 - 1.4 mg/dL    Calcium 8.2 (L) 8.7 - 10.5 mg/dL    Anion Gap 9 8 - 16 mmol/L    eGFR >60 >60 mL/min/1.73 m^2   Phosphorus    Collection Time: 09/29/22 12:01 AM   Result Value Ref Range    Phosphorus 2.0 (L) 2.7 - 4.5 mg/dL   POCT glucose    Collection Time: 09/29/22 12:50 AM   Result Value Ref Range    POCT Glucose 168 (H) 70 - 110 mg/dL   POCT glucose    Collection Time: 09/29/22  4:07 AM   Result Value Ref Range    POCT Glucose 162 (H) 70 - 110 mg/dL   Basic metabolic panel    Collection Time: 09/29/22  5:58 AM   Result Value Ref Range    Sodium 145 136 - 145 mmol/L    Potassium 3.4 (L) 3.5 - 5.1 mmol/L    Chloride 108 95 - 110 mmol/L    CO2 26 23 - 29 mmol/L    Glucose 157 (H) 70 - 110 mg/dL    BUN 20 8 - 23 mg/dL    Creatinine 0.8 0.5 - 1.4 mg/dL    Calcium 8.0 (L) 8.7 - 10.5 mg/dL    Anion Gap 11 8 - 16 mmol/L    eGFR >60 >60 mL/min/1.73 m^2   Phosphorus    Collection Time: 09/29/22  5:58 AM   Result Value Ref Range    Phosphorus 2.2 (L) 2.7 - 4.5 mg/dL   CBC auto differential    Collection Time: 09/29/22  6:56 AM   Result Value Ref Range    WBC 6.36 3.90 - 12.70 K/uL    RBC 3.24 (L) 4.60 - 6.20 M/uL    Hemoglobin 8.7 (L) 14.0 - 18.0 g/dL    Hematocrit 28.7 (L) 40.0 - 54.0 %    MCV 89 82 - 98 fL    MCH 26.9 (L) 27.0 - 31.0 pg    MCHC 30.3 (L) 32.0 - 36.0 g/dL    RDW 15.9 (H) 11.5 - 14.5 %    Platelets 242 150 - 450 K/uL    MPV 10.2 9.2 - 12.9 fL    Immature Granulocytes 0.9 (H) 0.0 - 0.5 %    Gran # (ANC) 4.2 1.8 - 7.7 K/uL    Immature Grans (Abs) 0.06 (H) 0.00 - 0.04 K/uL    Lymph # 1.7 1.0 - 4.8 K/uL    Mono # 0.4 0.3 - 1.0 K/uL    Eos # 0.1  0.0 - 0.5 K/uL    Baso # 0.00 0.00 - 0.20 K/uL    nRBC 0 0 /100 WBC    Gran % 65.3 38.0 - 73.0 %    Lymph % 26.4 18.0 - 48.0 %    Mono % 6.6 4.0 - 15.0 %    Eosinophil % 0.8 0.0 - 8.0 %    Basophil % 0.0 0.0 - 1.9 %    Differential Method Automated        Microbiology Data   Blood Culture, Routine   Date Value Ref Range Status   09/27/2022 No Growth to date  Preliminary   09/27/2022 No Growth to date  Preliminary       Other Results:    Radiology Data   No interval imaging.     Current Medications:     Infusions:         Scheduled:   apixaban  5 mg Oral BID    atorvastatin  20 mg Oral QHS    brimonidine 0.15 % OPTH DROP  1 drop Both Eyes BID    dorzolamide  1 drop Both Eyes BID    EScitalopram oxalate  5 mg Oral Daily    finasteride  5 mg Oral Daily    latanoprost  1 drop Both Eyes QHS    magnesium sulfate IVPB  2 g Intravenous Once    potassium chloride  30 mEq Oral Once    potassium, sodium phosphates  2 packet Oral TID    prednisoLONE acetate  1 drop Both Eyes QHS    tamsulosin  0.4 mg Oral QHS    timolol maleate 0.5%  1 drop Both Eyes BID        PRN:  sodium chloride 0.9%    Assessment:     Adalberto Medina is a 82 y.o.male with h/o HTN, recent subsegmental PE, legal blindness, T2DM, depression, and BPH who presented to [facility] on 9/27/2022 with a primary complaint of altered mental status found to be dehydrated and hypernatremic. Pt with 7.5kg weight loss since discharge on 9/4. Na improved and will continue enteral free water to 250 q4hrs and d/c D5W gtt. Plan to discuss pt treatment plan with daughter again given moderate improvement in pt's clinical status but still unclear if pt can return to baseline function per daughter's description.      Plan:     Acute (on chronic?) Encephalopathy:  - Most likely in setting of hyperNa   - Recent meds started were eliquis and lexapro (more likely to cause hypoNa/SIADH)  - CT head without evidence of infarct or bleed, possible impacted cerumen vs soft  tissue mass. On exam, evident that b/l ears are impacted with cerumen and will require disimpaction on OP basis by ENT.   - Pt with similar presentation a few weeks ago, waxing and waning mental status, per last discharge pt was lethargic and A&Ox1 but per daughter's report to ED physician more with it   - Pt able to respond with his own and daughter's names. Still somnolent, but improving mental status       Hypernatremia and Dehydration 2/2 Dec Po Intake:   Failure to Thrive:   - Pt reportedly not eating at [facility] and issues with dysphagia during last admit   - S/p 1L NS and 1L d5 in the ED, recheck BMP as pt has 2.2L free water deficit (to get to 145 Na) on admission   - NG for enteral replacement in pt with encephalopathy  - ST consult   - Aim to correct Na by 10 in 24 hours - goal 145 after 24hrs from admission  - initial Na 155 -> 158 --> 155: inc D5w infusion to 125 ml/hr and inc enteral free water to 250 q4hrs   - Continue trickle feeds and advance as tolerated with nutrition recs   - Pt with documented weight of 55.6kg, that is 7.5kg weight loss in 3 weeks (~12% BW in 3 weeks)   - repeat Na now 145, d/c'd D5W drip and just continuing free water flushes with enteral feeds  - continuing to adjust electrolytes, hydration status, and feed appropriately via TF's with vigilance for refeeding syndrome  - per speech, pleasure feeding (pureed for comfort) and clears   - per dietary, switch feeds from impact peptide 1.5 to glucerna 1.5 and advance as tolerated from 10ml/hr to 55ml/hr     Elevated Troponin, stable:  - EKG stable with known LBBB  - Troponin stable, will repeat if worsening      PE:  - Continue Eliquis 5mg BID  - Recently dx at last hospital stay      HTN:  - BP initially low but inc after fluids  - Home meds include losartan 100mg daily,  -  will plan to restart when BP recovers and persistently stays elevated     Severe Malnutrition:  - Pt unable to adequately take po for nourishment,  and had standing appt with GI for placement of PEG but missed due to inability to acquire transport   - NG tube placement and initiate tube feeds  - Nutrition consult for optimization      T2DM:  - POCT glucose checks q4 hrs   - Initiate enteral feeds and follow labs for refeeding   - nutrition consult      Legally Blind  Depression:  - Cont home lexapro      BPH:  - Continue home flomax and proscar      Wounds, present on admission  - Consult wound care   - Frequent turns    Psychosis:  - H/o psychosis on risperidone 1mg qam and 2mg qhs - will hold in setting of encephalopathy and hyperNa      Diet: Tube feeds via NG   DVT PPX: Eliquis   Dispo: Pending improvement in dehydration/hyperNa and nutrition   CODE: DNR    Jing Barakat, DO  [facility] Medicine PGY1  Medicine Service - Team B      [facility] Medicine Hospitalist Pager numbers:   [facility] Hospitalist Medicine Team A (Petra/Demetrio): [phone]  [facility] Hospitalist Medicine Team B (Isiah/Ranjana):  [phone]

## 2022-09-29 NOTE — PLAN OF CARE
VN Note: labs, orders, notes, care plan review will be available as needed.   Problem: Adult Inpatient Plan of Care  Goal: Plan of Care Review  Outcome: Ongoing, Progressing

## 2022-09-29 NOTE — PHARMACY MED REC
"[facility]           Pharmacy  Admission Medication History     The home medication history was taken by Oc Tolliver PharmD.      Medication history obtained from Medications listed below were obtained from: Nursing home    Based on information gathered for medication list, you may go to "Admission" then "Reconcile Home Medications" tabs to review and/or act upon those items.     The home medication list has been updated by the Pharmacy department.   Please read ALL comments highlighted in yellow.   Please address this information as you see fit.    Feel free to contact us if you have any questions or require assistance.    The medications listed below were removed from the home medication list.  Please reorder if appropriate:    Patient reports NOT TAKING the following medication(s):    Patient reports he/she IS TAKING the following which was not ordered upon admit    Patient reports taking a DIFFERENT DRUG than that ordered upon admit    Patient reports taking a drug DIFFERENTLY than how ordered upon admit      Potential issues to be addressed PRIOR TO DISCHARGE      No current facility-administered medications on file prior to encounter.     Current Outpatient Medications on File Prior to Encounter   Medication Sig Dispense Refill    acetaminophen (TYLENOL) 500 MG tablet Take 500 mg by mouth 2 (two) times a day.      ALPHAGAN P 0.1 % Drop Place 1 drop into both eyes 2 (two) times a day.      apixaban (ELIQUIS) 5 mg Tab Take 1 tablet (5 mg total) by mouth 2 (two) times daily. 60 tablet 0    ascorbic acid, vitamin C, (VITAMIN C) 500 MG tablet Take 500 mg by mouth 2 (two) times daily.      atorvastatin (LIPITOR) 20 MG tablet Take 20 mg by mouth every evening.      dorzolamide (TRUSOPT) 2 % ophthalmic solution Place 1 drop into both eyes 2 (two) times a day.      EScitalopram oxalate (LEXAPRO) 5 MG Tab Take 1 tablet (5 mg total) by mouth once daily. 30 tablet 11    finasteride (PROSCAR) 5 mg " tablet Take 5 mg by mouth once daily.      fluticasone propionate (FLONASE) 50 mcg/actuation nasal spray 1 spray by Each Nostril route once daily.      latanoprost 0.005 % ophthalmic solution Place into both eyes every evening.      losartan (COZAAR) 100 MG tablet Take 100 mg by mouth once daily.      multivitamin (THERAGRAN) per tablet Take 1 tablet by mouth once daily.      prednisoLONE acetate (PRED FORTE) 1 % DrpS Place 1 drop into both eyes every evening.      risperiDONE (RISPERDAL) 1 MG tablet Take 1 mg by mouth once daily. Daily @ 3pm      risperiDONE (RISPERDAL) 2 MG tablet Take 2 mg by mouth nightly.      tamsulosin (FLOMAX) 0.4 mg Cap Take 0.4 mg by mouth every evening.      timolol maleate 0.5% (TIMOPTIC) 0.5 % Drop Place 1 drop into both eyes 2 (two) times daily.      zinc sulfate (ZINCATE) 50 mg zinc (220 mg) capsule Take 220 mg by mouth once daily.      [DISCONTINUED] megestroL (MEGACE) 400 mg/10 mL (40 mg/mL) Susp Take 800 mg by mouth Daily.         Please address this information as you see fit.  Feel free to contact us if you have any questions or require assistance.    Oc Tolliver, PharmD  [phone]                 .

## 2022-09-29 NOTE — CONSULTS
[facility] - Telemetry  Wound Care    Patient Name:  Adalberto Medina   MRN:  [id number]  Date: 9/29/2022  Diagnosis: Hypernatremia    History:     Past Medical History:   Diagnosis Date    Essential hypertension     Legal blindness     Preglaucoma     Type 2 diabetes mellitus        Social History     Socioeconomic History    Marital status:    Tobacco Use    Smoking status: Former    Smokeless tobacco: Never   Substance and Sexual Activity    Alcohol use: Not Currently    Drug use: Not Currently       Precautions:     Allergies as of 09/27/2022    (No Known Allergies)       WOC Assessment Details/Treatment     Coccyx/sacrum- Stage 4 pressure injury- present on admit- slough/pale pink tissue-         R hip- intact scar tissue with discoloration        R ear- full thickness wound- present on admit 100% slough      R heel- intact with no redness        R lateral ankle- non-blanchable redness with purple/maroon discolored center- present on admit        L heel- intact with no redness          L medial foot- non-blanchable redness distally with darkened dry discolored skin proximally       Recommendations discussed with nurse and Dr. Barakat-  - Nursing to maintain pressure injury prevention interventions to include waffle overlay and heel boots  - Triad ointment to sacrum wound BID and prn incontinent episode  - Mepilex border to R lateral ankle and L medial foot 2x/week    09/29/2022

## 2022-09-30 PROBLEM — Z51.5 PALLIATIVE CARE ENCOUNTER: Status: ACTIVE | Noted: 2022-09-30

## 2022-09-30 LAB
ANION GAP SERPL CALC-SCNC: 8 MMOL/L (ref 8–16)
BASOPHILS # BLD AUTO: 0.01 K/UL (ref 0–0.2)
BASOPHILS NFR BLD: 0.2 % (ref 0–1.9)
BUN SERPL-MCNC: 19 MG/DL (ref 8–23)
BUN SERPL-MCNC: 20 MG/DL (ref 8–23)
BUN SERPL-MCNC: 20 MG/DL (ref 8–23)
CALCIUM SERPL-MCNC: 7.6 MG/DL (ref 8.7–10.5)
CALCIUM SERPL-MCNC: 8 MG/DL (ref 8.7–10.5)
CALCIUM SERPL-MCNC: 8 MG/DL (ref 8.7–10.5)
CHLORIDE SERPL-SCNC: 107 MMOL/L (ref 95–110)
CHLORIDE SERPL-SCNC: 107 MMOL/L (ref 95–110)
CHLORIDE SERPL-SCNC: 108 MMOL/L (ref 95–110)
CO2 SERPL-SCNC: 27 MMOL/L (ref 23–29)
CO2 SERPL-SCNC: 28 MMOL/L (ref 23–29)
CO2 SERPL-SCNC: 29 MMOL/L (ref 23–29)
CREAT SERPL-MCNC: 0.7 MG/DL (ref 0.5–1.4)
DIFFERENTIAL METHOD: ABNORMAL
EOSINOPHIL # BLD AUTO: 0 K/UL (ref 0–0.5)
EOSINOPHIL NFR BLD: 0.7 % (ref 0–8)
ERYTHROCYTE [DISTWIDTH] IN BLOOD BY AUTOMATED COUNT: 15.9 % (ref 11.5–14.5)
EST. GFR  (NO RACE VARIABLE): >60 ML/MIN/1.73 M^2
GLUCOSE SERPL-MCNC: 112 MG/DL (ref 70–110)
GLUCOSE SERPL-MCNC: 113 MG/DL (ref 70–110)
GLUCOSE SERPL-MCNC: 117 MG/DL (ref 70–110)
HCT VFR BLD AUTO: 34.6 % (ref 40–54)
HGB BLD-MCNC: 10.9 G/DL (ref 14–18)
IMM GRANULOCYTES # BLD AUTO: 0.06 K/UL (ref 0–0.04)
IMM GRANULOCYTES NFR BLD AUTO: 1.3 % (ref 0–0.5)
LYMPHOCYTES # BLD AUTO: 1.3 K/UL (ref 1–4.8)
LYMPHOCYTES NFR BLD: 28.9 % (ref 18–48)
MCH RBC QN AUTO: 27.6 PG (ref 27–31)
MCHC RBC AUTO-ENTMCNC: 31.5 G/DL (ref 32–36)
MCV RBC AUTO: 88 FL (ref 82–98)
MONOCYTES # BLD AUTO: 0.3 K/UL (ref 0.3–1)
MONOCYTES NFR BLD: 6.3 % (ref 4–15)
NEUTROPHILS # BLD AUTO: 2.8 K/UL (ref 1.8–7.7)
NEUTROPHILS NFR BLD: 62.6 % (ref 38–73)
NRBC BLD-RTO: 0 /100 WBC
PHOSPHATE SERPL-MCNC: 2.3 MG/DL (ref 2.7–4.5)
PHOSPHATE SERPL-MCNC: 2.4 MG/DL (ref 2.7–4.5)
PHOSPHATE SERPL-MCNC: 2.4 MG/DL (ref 2.7–4.5)
PHOSPHATE SERPL-MCNC: 2.5 MG/DL (ref 2.7–4.5)
PLATELET # BLD AUTO: 256 K/UL (ref 150–450)
PMV BLD AUTO: 9.9 FL (ref 9.2–12.9)
POCT GLUCOSE: 110 MG/DL (ref 70–110)
POCT GLUCOSE: 113 MG/DL (ref 70–110)
POCT GLUCOSE: 145 MG/DL (ref 70–110)
POTASSIUM SERPL-SCNC: 4.1 MMOL/L (ref 3.5–5.1)
POTASSIUM SERPL-SCNC: 4.1 MMOL/L (ref 3.5–5.1)
POTASSIUM SERPL-SCNC: 4.4 MMOL/L (ref 3.5–5.1)
RBC # BLD AUTO: 3.95 M/UL (ref 4.6–6.2)
SODIUM SERPL-SCNC: 142 MMOL/L (ref 136–145)
SODIUM SERPL-SCNC: 143 MMOL/L (ref 136–145)
SODIUM SERPL-SCNC: 145 MMOL/L (ref 136–145)
WBC # BLD AUTO: 4.47 K/UL (ref 3.9–12.7)

## 2022-09-30 PROCEDURE — 99900035 HC TECH TIME PER 15 MIN (STAT)

## 2022-09-30 PROCEDURE — 84100 ASSAY OF PHOSPHORUS: CPT | Mod: 91 | Performed by: STUDENT IN AN ORGANIZED HEALTH CARE EDUCATION/TRAINING PROGRAM

## 2022-09-30 PROCEDURE — 36415 COLL VENOUS BLD VENIPUNCTURE: CPT | Performed by: STUDENT IN AN ORGANIZED HEALTH CARE EDUCATION/TRAINING PROGRAM

## 2022-09-30 PROCEDURE — 85025 COMPLETE CBC W/AUTO DIFF WBC: CPT | Performed by: STUDENT IN AN ORGANIZED HEALTH CARE EDUCATION/TRAINING PROGRAM

## 2022-09-30 PROCEDURE — 80048 BASIC METABOLIC PNL TOTAL CA: CPT | Performed by: STUDENT IN AN ORGANIZED HEALTH CARE EDUCATION/TRAINING PROGRAM

## 2022-09-30 PROCEDURE — 99497 PR ADVNCD CARE PLAN 30 MIN: ICD-10-PCS | Mod: 25,,,

## 2022-09-30 PROCEDURE — 94761 N-INVAS EAR/PLS OXIMETRY MLT: CPT

## 2022-09-30 PROCEDURE — 99497 ADVNCD CARE PLAN 30 MIN: CPT | Mod: 25,,,

## 2022-09-30 PROCEDURE — 80048 BASIC METABOLIC PNL TOTAL CA: CPT | Mod: 91 | Performed by: STUDENT IN AN ORGANIZED HEALTH CARE EDUCATION/TRAINING PROGRAM

## 2022-09-30 PROCEDURE — 84100 ASSAY OF PHOSPHORUS: CPT | Performed by: STUDENT IN AN ORGANIZED HEALTH CARE EDUCATION/TRAINING PROGRAM

## 2022-09-30 PROCEDURE — 25000003 PHARM REV CODE 250: Performed by: STUDENT IN AN ORGANIZED HEALTH CARE EDUCATION/TRAINING PROGRAM

## 2022-09-30 PROCEDURE — 99223 1ST HOSP IP/OBS HIGH 75: CPT | Mod: ,,,

## 2022-09-30 PROCEDURE — 11000001 HC ACUTE MED/SURG PRIVATE ROOM

## 2022-09-30 PROCEDURE — 99223 PR INITIAL HOSPITAL CARE,LEVL III: ICD-10-PCS | Mod: ,,,

## 2022-09-30 RX ORDER — THIAMINE HCL 100 MG
100 TABLET ORAL DAILY
Status: DISCONTINUED | OUTPATIENT
Start: 2022-09-30 | End: 2022-10-02

## 2022-09-30 RX ORDER — SODIUM,POTASSIUM PHOSPHATES 280-250MG
2 POWDER IN PACKET (EA) ORAL 3 TIMES DAILY
Status: COMPLETED | OUTPATIENT
Start: 2022-09-30 | End: 2022-10-01

## 2022-09-30 RX ADMIN — TIMOLOL MALEATE 1 DROP: 5 SOLUTION/ DROPS OPHTHALMIC at 09:09

## 2022-09-30 RX ADMIN — POTASSIUM & SODIUM PHOSPHATES POWDER PACK 280-160-250 MG 2 PACKET: 280-160-250 PACK at 02:09

## 2022-09-30 RX ADMIN — TAMSULOSIN HYDROCHLORIDE 0.4 MG: 0.4 CAPSULE ORAL at 08:09

## 2022-09-30 RX ADMIN — BRIMONIDINE TARTRATE 1 DROP: 1.5 SOLUTION OPHTHALMIC at 08:09

## 2022-09-30 RX ADMIN — PREDNISOLONE ACETATE 1 DROP: 10 SUSPENSION/ DROPS OPHTHALMIC at 08:09

## 2022-09-30 RX ADMIN — APIXABAN 5 MG: 5 TABLET, FILM COATED ORAL at 08:09

## 2022-09-30 RX ADMIN — ATORVASTATIN CALCIUM 20 MG: 20 TABLET, FILM COATED ORAL at 08:09

## 2022-09-30 RX ADMIN — POTASSIUM & SODIUM PHOSPHATES POWDER PACK 280-160-250 MG 2 PACKET: 280-160-250 PACK at 08:09

## 2022-09-30 RX ADMIN — BRIMONIDINE TARTRATE 1 DROP: 1.5 SOLUTION OPHTHALMIC at 09:09

## 2022-09-30 RX ADMIN — ESCITALOPRAM OXALATE 5 MG: 5 TABLET, FILM COATED ORAL at 09:09

## 2022-09-30 RX ADMIN — THERA TABS 1 TABLET: TAB at 09:09

## 2022-09-30 RX ADMIN — Medication 100 MG: at 09:09

## 2022-09-30 RX ADMIN — LATANOPROST 1 DROP: 50 SOLUTION OPHTHALMIC at 08:09

## 2022-09-30 RX ADMIN — DORZOLAMIDE HYDROCHLORIDE 1 DROP: 20 SOLUTION/ DROPS OPHTHALMIC at 08:09

## 2022-09-30 RX ADMIN — DORZOLAMIDE HYDROCHLORIDE 1 DROP: 20 SOLUTION/ DROPS OPHTHALMIC at 09:09

## 2022-09-30 RX ADMIN — APIXABAN 5 MG: 5 TABLET, FILM COATED ORAL at 09:09

## 2022-09-30 NOTE — PLAN OF CARE
Problem: Adult Inpatient Plan of Care  Goal: Plan of Care Review  9/30/2022 0602 by Liliana Mota RN  Outcome: Ongoing, Progressing  9/30/2022 0556 by Liliana Mota RN  Outcome: Ongoing, Progressing  Goal: Patient-Specific Goal (Individualized)  9/30/2022 0602 by Liliana Mota RN  Outcome: Ongoing, Progressing  9/30/2022 0556 by Liliana Mota RN  Outcome: Ongoing, Progressing  Goal: Absence of Hospital-Acquired Illness or Injury  9/30/2022 0602 by Liliana Mota RN  Outcome: Ongoing, Progressing  9/30/2022 0556 by Liliana Mota RN  Outcome: Ongoing, Progressing  Goal: Optimal Comfort and Wellbeing  9/30/2022 0602 by Liliana Mota RN  Outcome: Ongoing, Progressing  9/30/2022 0556 by Liliana Mota RN  Outcome: Ongoing, Progressing  Goal: Readiness for Transition of Care  9/30/2022 0602 by Liliana Mota RN  Outcome: Ongoing, Progressing  9/30/2022 0556 by Liliana Mota RN  Outcome: Ongoing, Progressing   Pt in room with no s/s of distress. Pt turned and repositioned. Oral care provided.

## 2022-09-30 NOTE — PLAN OF CARE
"[place] - Telemetry  Discharge Reassessment    Primary Care Provider: Primary Doctor No    Expected Discharge Date:     Reassessment (most recent)       Discharge Reassessment - 09/30/22 2698          Discharge Reassessment    Assessment Type Discharge Planning Reassessment (P)      Did the patient's condition or plan change since previous assessment? Yes (P)      Discharge Plan discussed with: Adult children (P)      Communicated KAYCE with patient/caregiver Yes (P)      Discharge Plan A Inpatient Hospice;New Nursing Home placement - group home care facility (P)      DME Needed Upon Discharge  none (P)      Discharge Barriers Identified Nursing Home rejection (P)    Daughter is declining for pt to return to [facility]. Pt does not have any DANNY at [facility] and is able to leave if necessary. Barriers discussed with daughter and if it can be facilited prior to DC we can. Referrals sent. She report she is amendable to IP hospice.    Why the patient remains in the hospital Requires continued medical care (P)    MD discussed pt is not medically ready for DC today.       Post-Acute Status    Post-Acute Authorization Placement (P)      Post-Acute Placement Status Referrals Sent (P)                    Future Appointments   Date Time Provider Department Center   10/4/2022  9:30 AM Luis A Adam MD [facility] GASTRO [facility]     /84 (BP Location: Right arm, Patient Position: Lying)   Pulse 88   Temp 97.2 °F (36.2 °C) (Oral)   Resp 18   Ht 5' 11" (1.803 m)   Wt 62.8 kg (138 lb 7.2 oz)   SpO2 100%   BMI 19.31 kg/m²      apixaban  5 mg Oral BID    atorvastatin  20 mg Oral QHS    brimonidine 0.15 % OPTH DROP  1 drop Both Eyes BID    dorzolamide  1 drop Both Eyes BID    EScitalopram oxalate  5 mg Oral Daily    finasteride  5 mg Oral Daily    latanoprost  1 drop Both Eyes QHS    multivitamin  1 tablet Oral Daily    potassium, sodium phosphates  2 packet Oral TID    prednisoLONE acetate  1 drop Both Eyes QHS    tamsulosin  0.4 mg " Oral QHS    thiamine  100 mg Oral Daily    timolol maleate 0.5%  1 drop Both Eyes BID

## 2022-09-30 NOTE — ASSESSMENT & PLAN NOTE
"At time of initial consult, pt laying in bed. No family at bedside.  Pt able to state his daughters name correctly.  Pt able to respond with a few one word answers when given ample time to form his word.  Pt endorses that he likes to watch "football" when asked what he like to watch on tv.  Pt is a resident of [facility].  PT is .    Reached out to pts daughter, Hallie Franklin, for collateral information.  Per Hallie, pt began to steadily decline over the past three months.  I expressed that this may be the patients new baseline and the pts daughter agrees with this.  Pt has been bed/ chair bound for the last year. Per daughter, she believed that the pts recent struggle has been the pts lack of nutritional intake.  Hallie believe that the pt is not being fed approprietly at the NH and thus has been requesting a PEG tube.  Per daughter, pt was to receive a PEG tube this week but the appointment was rescheduled.      Hallie associated Hospice ( as apparently this option has been brought up at the NH) as starving the pt to death. We discussed the theory and goals behind hospice.  Hallie expressed she has never actually had a true discussion about hospice and appriciated the information.    We discussed what the pt valued in life- pts daughter identified travel and eating.  We discussed what she thought the pt would express about the PEG tube placement if he could tell us.  Hallie is unsure what he would want but she expresses that she just wants to try to include him in the decision making process.  Hallie reports that she feels torn.  She expresses that she understands that the PEG tube will not fix her father and may ultimately prolong his decline and death , but feels like if she opts to not place the PEG tube she is "opting to starve her father to death." We discussed the body's natural process of slowing down as well.    Pts daughter to come to hospital this afternoon and we will have an in person " "meeting in an attempt to include the pt in medical decision making and establish goals of care.     Meeting was held at the bedside with the pt, his daughter Hallie, palliative medicine team, Kendell with Case management and Dr Chavez.  Goals of care were discussed with a focus on PEG tube placement.  After discussion, Hallie ultimately decided that she would like to show compassion to her father at the end of his life and offer him pleasure feeds and forgo any invasive procedures including PEG tube placement.  When asked about feeding tubes  the patient responded, " take it out".  Hallie endorsed that she wanted to support her fathers wishes even if this shortens his life to some degree. Hallie showed appropriate insight into his medical condition and decision making.     We discussed hospice enrollment moving forward. Pts daughter is agreeable to hospice and feels this option aligns with what her father would want. This is the pts fourth hospital admission since 5/2022 for similar etiologies. Pts FTT and steady decline are apparent to the Hallie and she endorses that each hospital admission has gotten harder on her father and she no longer sees improvements after admissions.   Hallie would like to enroll her father in inpatient hospice placement.    Disucssed pt with the Director of Hospice and Palliative Medicine Dr Christianson.  Pts prognosis of 2-4 weeks in the absence of artifical food and nutrition.  Pt is hospice qualified.  "

## 2022-09-30 NOTE — PROGRESS NOTES
[facility] Internal Medicine Progress Note    Primary Team: [facility] Medicine B  Attending Physician: Chandana Joyce MD  Resident: Saeed Chavez MD  Intern: Jing Barakat DO      Subjective:      Pt resting this morning comfortably, denies any pain or discomfort at this moment. Continues to respond to interview in whispers, is able to identify that he is in a hospital but incorrectly answered that the [employer] won their last game.     Objective:   Last 24 Hour Vital Signs:  BP  Min: 95/51  Max: 131/77  Temp  Av.2 °F (36.2 °C)  Min: 96 °F (35.6 °C)  Max: 98.7 °F (37.1 °C)  Pulse  Av  Min: 80  Max: 89  Resp  Av.1  Min: 16  Max: 18  SpO2  Av.8 %  Min: 99 %  Max: 100 %  I/O last 3 completed shifts:  In: 990 [NG/GT:990]  Out: 770 [Urine:770]    Physical Examination:  General:  Cachectic appearing elderly gentleman, somnolent  HEENT:  Temporal wasting, no scleral icterus, R ear with abrasion/wound with bandage, no nasal discharge, dry mucous membranes but improved. B/l ears impacted with cerumen  Respiratory: CTAB, No increased WOB, symmetrical chest rise, no wheezing or rhonchi   Cardiovascular: Normal rate, regular rhythm and normal heart sounds with no murmurs, rubs, or gallops, pulses 2+ and symmetric throughout, no LE edema  Abdominal: Soft, nontender, scaphoid abdomen  Musculoskeletal: Able to lift b/l hands against gravity, no effort to move b/l LE when prompted   Neurological: sleepy but wakes to loud voice or noxious stimuli, is able to shake head and whisper in response to interview, no appreciable focal cranial nerve deficits but difficult to assess due to mentation and pt cooperation, able to lift b/l upper extremities to gravity and 3/5 b/l hand strength, able to whisper name and name of daughter correctly, A&Ox2 (person and place)  Extremities and Skin: Skin is warm and dry, stage 3 sacral wound, abrasion to R ear, b/l feet wrapped in guaze     Laboratory:  Laboratory Data   Recent Results  (from the past 12 hour(s))   POCT glucose    Collection Time: 09/29/22  9:04 PM   Result Value Ref Range    POCT Glucose 113 (H) 70 - 110 mg/dL   Basic metabolic panel    Collection Time: 09/30/22 12:02 AM   Result Value Ref Range    Sodium 145 136 - 145 mmol/L    Potassium 4.1 3.5 - 5.1 mmol/L    Chloride 108 95 - 110 mmol/L    CO2 29 23 - 29 mmol/L    Glucose 113 (H) 70 - 110 mg/dL    BUN 20 8 - 23 mg/dL    Creatinine 0.7 0.5 - 1.4 mg/dL    Calcium 7.6 (L) 8.7 - 10.5 mg/dL    Anion Gap 8 8 - 16 mmol/L    eGFR >60 >60 mL/min/1.73 m^2   Phosphorus    Collection Time: 09/30/22 12:02 AM   Result Value Ref Range    Phosphorus 2.4 (L) 2.7 - 4.5 mg/dL   POCT glucose    Collection Time: 09/30/22  5:31 AM   Result Value Ref Range    POCT Glucose 110 70 - 110 mg/dL   Basic metabolic panel    Collection Time: 09/30/22  6:06 AM   Result Value Ref Range    Sodium 142 136 - 145 mmol/L    Potassium 4.1 3.5 - 5.1 mmol/L    Chloride 107 95 - 110 mmol/L    CO2 27 23 - 29 mmol/L    Glucose 112 (H) 70 - 110 mg/dL    BUN 19 8 - 23 mg/dL    Creatinine 0.7 0.5 - 1.4 mg/dL    Calcium 8.0 (L) 8.7 - 10.5 mg/dL    Anion Gap 8 8 - 16 mmol/L    eGFR >60 >60 mL/min/1.73 m^2   Phosphorus    Collection Time: 09/30/22  6:06 AM   Result Value Ref Range    Phosphorus 2.4 (L) 2.7 - 4.5 mg/dL       Microbiology Data   Blood Culture, Routine   Date Value Ref Range Status   09/27/2022 No Growth to date  Preliminary   09/27/2022 No Growth to date  Preliminary   09/27/2022 No Growth to date  Preliminary       Other Results:    Radiology Data   No interval imaging.     Current Medications:     Infusions:         Scheduled:   apixaban  5 mg Oral BID    atorvastatin  20 mg Oral QHS    brimonidine 0.15 % OPTH DROP  1 drop Both Eyes BID    dorzolamide  1 drop Both Eyes BID    EScitalopram oxalate  5 mg Oral Daily    finasteride  5 mg Oral Daily    latanoprost  1 drop Both Eyes QHS    prednisoLONE acetate  1 drop Both Eyes QHS    tamsulosin  0.4 mg Oral QHS     timolol maleate 0.5%  1 drop Both Eyes BID        PRN:  sodium chloride 0.9%    Assessment:     Adalberto Medina is a 82 y.o.male with h/o HTN, recent subsegmental PE, legal blindness, T2DM, depression, and BPH who presented to [facility] on 9/27/2022 with a primary complaint of altered mental status found to be dehydrated and hypernatremic. Pt with 7.5kg weight loss since discharge on 9/4. Na improved and will continue enteral free water to 250 q4hrs and d/c D5W gtt. Plan to discuss pt treatment plan with daughter again given slow but steady improvement in pt's clinical status but still unclear if pt can return to baseline function per daughter's description.      Plan:     Acute on chronic Encephalopathy  Dementia   - Most likely in setting of hyperNa   - Recent meds started were eliquis and lexapro (more likely to cause hypoNa/SIADH)  - CT head without evidence of infarct or bleed, possible impacted cerumen vs soft tissue mass. On exam, evident that b/l ears are impacted with cerumen and will require disimpaction on OP basis by ENT.   - Pt with similar presentation a few weeks ago, waxing and waning mental status, per last discharge pt was lethargic and A&Ox1 but per daughter's report to ED physician more with it   - Pt able to respond with his own and daughter's names, can identify that he is in a hospital. Still somnolent, but improving mental status slightly. Pending conversation with family today.        Hypernatremia and Dehydration 2/2 Dec Po Intake:   Failure to Thrive:   - Pt reportedly not eating at [facility] and issues with dysphagia during last admit   - S/p 1L NS and 1L d5 in the ED, recheck BMP as pt had 2.2L free water deficit (to get to 145 Na) on admission   - NG for enteral replacement in pt with encephalopathy  - ST consult   - Na initally 155->159. Was corrected slowly over two days with d5 and free water flushs in NGT. Now around 142, will check CMP daily.   - Pt  with documented weight of 55.6kg, that is 7.5kg weight loss in 3 weeks (~12% BW in 3 weeks)   - continuing to adjust electrolytes, hydration status, and feed appropriately via TF's with vigilance for refeeding syndrome  - per speech, pleasure feeding (pureed for comfort) and clears   - glucerna 1.5 55ml/hr     Elevated Troponin, stable:  - EKG stable with known LBBB  - Troponin stable, will repeat if worsening      PE:  - Continue Eliquis 5mg BID  - Recently dx at last hospital stay      HTN:  - BP initially low but inc after fluids  - Home meds include losartan 100mg daily,  -  will plan to restart when BP recovers and persistently stays elevated     Severe Malnutrition:  - Pt unable to adequately take po for nourishment, and had standing appt with GI for placement of PEG but missed due to inability to acquire transport   - NG tube placement and initiate tube feeds  - Nutrition consult for optimization      T2DM:  - POCT glucose checks q4 hrs   - Initiate enteral feeds and follow labs for refeeding   - nutrition consult      Legally Blind  Depression:  - Cont home lexapro      BPH:  - Continue home flomax and proscar      Wounds, present on admission  - Consult wound care   - Frequent turns    Psychosis:  - H/o psychosis on risperidone 1mg qam and 2mg qhs - will hold in setting of encephalopathy and hyperNa      Diet: Tube feeds via NG   DVT PPX: Eliquis   Dispo: Pending improvement in dehydration/hyperNa and nutrition   CODE: DNR    Abel Rao, MS3    Saeed Chavez MD  [facility] Medicine PGY3  Medicine Service - Team B      [facility] Medicine Hospitalist Pager numbers:   [facility] Hospitalist Medicine Team A (Petra/Demetrio): [phone]  [facility] Hospitalist Medicine Team B (Isiah/Ranjana):  [phone]

## 2022-09-30 NOTE — CONSULTS
"[facility]  Palliative Medicine  Consult Note    Patient Name: Adalberto Medina  MRN: [id number]  Admission Date: 9/27/2022  Hospital Length of Stay: 2 days  Code Status: DNR   Attending Provider: Chandana Joyce MD  Consulting Provider: Patria Fontenot NP  Primary Care Physician: Primary Doctor No  Principal Problem:Hypernatremia    Patient information was obtained from patient, relative(s), past medical records and primary team.      Inpatient consult to Palliative Care  Consult performed by: Patria Fontenot NP  Consult ordered by: Jing Barakat MD  Reason for consult: Goals of Care/ PEG tube placement and feeding options         Assessment/Plan:     Palliative care encounter  At time of initial consult, pt laying in bed. No family at bedside.  Pt able to state his daughters name correctly.  Pt able to respond with a few one word answers when given ample time to form his word.  Pt endorses that he likes to watch "football" when asked what he like to watch on tv.  Pt is a resident of [facility].  PT is .    Reached out to pts daughter, Hallie Franklin, for collateral information.  Per Hallie, pt began to steadily decline over the past three months.  I expressed that this may be the patients new baseline and the pts daughter agrees with this.  Pt has been bed/ chair bound for the last year. Per daughter, she believed that the pts recent struggle has been the pts lack of nutritional intake.  Hallie believe that the pt is not being fed approprietly at the NH and thus has been requesting a PEG tube.  Per daughter, pt was to receive a PEG tube this week but the appointment was rescheduled.      Hallie associated Hospice ( as apparently this option has been brought up at the NH) as starving the pt to death. We discussed the theory and goals behind hospice.  Hallie expressed she has never actually had a true discussion about hospice and appriciated the information.    We discussed what the pt valued in " "life- pts daughter identified travel and eating.  We discussed what she thought the pt would express about the PEG tube placement if he could tell us.  Hallie is unsure what he would want but she expresses that she just wants to try to include him in the decision making process.  Hallie reports that she feels torn.  She expresses that she understands that the PEG tube will not fix her father and may ultimately prolong his decline and death , but feels like if she opts to not place the PEG tube she is "opting to starve her father to death." We discussed the body's natural process of slowing down as well.    Pts daughter to come to hospital this afternoon and we will have an in person meeting in an attempt to include the pt in medical decision making and establish goals of care.     Meeting was held at the bedside with the pt, his daughter Hallie, palliative medicine team, Kendell with Case management and Dr Chavez.  Goals of care were discussed with a focus on PEG tube placement.  After discussion, Hallie ultimately decided that she would like to show compassion to her father at the end of his life and offer him pleasure feeds and forgo any invasive procedures including PEG tube placement.  When asked about feeding tubes  the patient responded, " take it out".  Hallie endorsed that she wanted to support her fathers wishes even if this shortens his life to some degree. Hallie showed appropriate insight into his medical condition and decision making.     We discussed hospice enrollment moving forward. Pts daughter is agreeable to hospice and feels this option aligns with what her father would want. This is the pts fourth hospital admission since 5/2022 for similar etiologies. Pts FTT and steady decline are apparent to the Hallie and she endorses that each hospital admission has gotten harder on her father and she no longer sees improvements after admissions.   Hallie would like to enroll her father in inpatient hospice " "placement.    Disucssed pt with the Director of Hospice and Palliative Medicine Dr Christianson.  Pts prognosis of 2-4 weeks in the absence of artifical food and nutrition.  Pt is hospice qualified.        Thank you for your consult. I will follow-up with patient. Please contact us if you have any additional questions.    Subjective:     HPI:   Per cahrt, "Adalberto Medina is a 82 y.o. male with h/o HTN, recent subsegmental PE, legal blindness, T2DM, depression, and BPH who presented to [facility] on 9/27/2022 with a primary complaint of altered mental status.      Limited history able to be obtained from pt as he is unable to effectively answer questions or communicate outside of nodding head and occasional short phrases. Per chart review, pt with recent hospitalization earlier this month with similar presentation of encephalopathy in addition to hypoxia at that time. He was dx with a PE. AMS worked up without definitive answer and per notes, pt with minimal improvement in mental status and physical exam on discharge was "somnolent" and "Aox1 person. Requiring stimulation to rouse. Unable to respond to questions and commands this am."  Per those notations, prior to that admission he was A&Ox4.      Daughter initially able to speak with ED physician stating that he was A&O to person/place/time and most recently at his baseline able to discuss the [place] game as of this past Sunday. She did note that he has had significantly decreased po intake due to dysphagia and previously his ACP docs included that he did not want a PEG tube. Reported paperwork from [facility] concern change in mental status and possible aspiration PNA. ROS unable to be obtained due to pt's mental status.      In the ED, vitals significant for initial low BP then inc with good O2 saturation on RA. Labs significant for hypernatremia to 155, elevated troponin to 0.042. CT head negative for acute intracranial abnormality other than R cerumen " "impaction (vs soft tissue mass). CXR with elevation of R hemidiaphragm (stable from prior), severe emphysematous changes, and no focal consolidation. EKG with stable LBBB and PACs. He received 1L NS and 1L D5 as well as 1g Rocephin."      Interval History: No acute events overnight.  Pt currently with NG tube placed. Pt denies any pain or acute distress. Pt requesting ice cream.     Past Medical History:   Diagnosis Date    Essential hypertension     Legal blindness     Preglaucoma     Type 2 diabetes mellitus        No past surgical history on file.    Review of patient's allergies indicates:  No Known Allergies    Medications:  Continuous Infusions:  Scheduled Meds:   apixaban  5 mg Oral BID    atorvastatin  20 mg Oral QHS    brimonidine 0.15 % OPTH DROP  1 drop Both Eyes BID    dorzolamide  1 drop Both Eyes BID    EScitalopram oxalate  5 mg Oral Daily    finasteride  5 mg Oral Daily    latanoprost  1 drop Both Eyes QHS    multivitamin  1 tablet Oral Daily    potassium, sodium phosphates  2 packet Oral TID    prednisoLONE acetate  1 drop Both Eyes QHS    tamsulosin  0.4 mg Oral QHS    thiamine  100 mg Oral Daily    timolol maleate 0.5%  1 drop Both Eyes BID     PRN Meds:sodium chloride 0.9%    Family History    None       Tobacco Use    Smoking status: Former    Smokeless tobacco: Never   Substance and Sexual Activity    Alcohol use: Not Currently    Drug use: Not Currently    Sexual activity: Not on file       Review of Systems   Unable to perform ROS: Acuity of condition   Objective:     Vital Signs (Most Recent):  Temp: 97.2 °F (36.2 °C) (09/30/22 1041)  Pulse: 98 (09/30/22 1558)  Resp: 18 (09/30/22 1041)  BP: 133/84 (09/30/22 1041)  SpO2: 100 % (09/30/22 1041) Vital Signs (24h Range):  Temp:  [96 °F (35.6 °C)-97.2 °F (36.2 °C)] 97.2 °F (36.2 °C)  Pulse:  [80-98] 98  Resp:  [16-18] 18  SpO2:  [99 %-100 %] 100 %  BP: (117-133)/(58-84) 133/84     Weight: 62.8 kg (138 lb 7.2 oz)  Body mass " index is 19.31 kg/m².    Physical Exam  Constitutional:       General: He is not in acute distress.     Appearance: He is cachectic. He is ill-appearing (chronically).      Comments: Temporal wasting   NG Tube    HENT:      Head: Normocephalic.      Mouth/Throat:      Mouth: Mucous membranes are dry.   Cardiovascular:      Rate and Rhythm: Normal rate.      Pulses: Normal pulses.   Pulmonary:      Effort: Pulmonary effort is normal.      Breath sounds: Decreased breath sounds present.   Abdominal:      General: Abdomen is flat. Bowel sounds are normal.      Palpations: Abdomen is soft.   Skin:     Capillary Refill: Capillary refill takes less than 2 seconds.      Coloration: Skin is pale.      Comments: Sacral wound per chart   Neurological:      Mental Status: Mental status is at baseline. He is lethargic.      Motor: Weakness present.   Psychiatric:         Speech: Speech is delayed.         Cognition and Memory: Memory is impaired.       Review of Symptoms      Symptom Assessment (ESAS 0-10 Scale)  Pain:  0  Dyspnea:  0  Anxiety:  0  Nausea:  0  Depression:  0  Anorexia:  0  Fatigue:  0  Insomnia:  0  Restlessness:  0  Agitation:  0  Unable to complete assessment due to Acuity of condition     CAM / Delirium:  Negative  Constipation:  Negative      Pain Assessment in Advanced Demential Scale (PAINAD)   Breathing - Independent of vocalization:  0  Negative vocalization:  0  Facial expression:  1  Body language:  0  Consolability:  0  Total:  1    ECOG Performance Status rdGrdrrdarddrderd:rd rd3rd Living Arrangements:  Lives in nursing home    Psychosocial/Cultural: Pt was a resident of [facility].  Pt will dc to inpatient hospice facility.    Spiritual:  F - Roseanne and Belief:  Restoration   I - Importance:  Moderate   C - Community:  Family support   A - Address in Care:  Pastoral visits PRN      Time-Based Charting:  Yes  Chart Review: 19 minutes  Face to Face: 21 minutes  Symptom Assessment: 13 minutes  Coordination  of Care: 14 minutes  Discharge Planning: 10 minutes  Advance Care Plannin minutes  Goals of Care: 8 minutes    Total Time Spent: 93 minutes      Advance Care Planning   Advance Directives:   Living Will: Yes        Copy on chart: Yes    LaPOST: No    Do Not Resuscitate Status: Yes    Medical Power of : Yes    Agent's Name:  Hallie Franklin   Agent's Contact Number:  [phone]    Decision Making:  Patient answered questions and Family answered questions       Significant Labs: All pertinent labs within the past 24 hours have been reviewed.  CBC:   Recent Labs   Lab 22  0735   WBC 4.47   HGB 10.9*   HCT 34.6*   MCV 88        BMP:  Recent Labs   Lab 22  1233   *      K 4.4      CO2 28   BUN 20   CREATININE 0.7   CALCIUM 8.0*     LFT:  Lab Results   Component Value Date    AST 26 2022    ALKPHOS 107 2022    BILITOT 0.6 2022     Albumin:   Albumin   Date Value Ref Range Status   2022 2.5 (L) 3.5 - 5.2 g/dL Final     Protein:   Total Protein   Date Value Ref Range Status   2022 6.7 6.0 - 8.4 g/dL Final     Lactic acid:   Lab Results   Component Value Date    LACTATE 1.8 2022    LACTATE 1.8 2022       Significant Imaging: I have reviewed all pertinent imaging results/findings within the past 24 hours.      Patria Fontenot NP  Palliative Medicine  [facility]    Advance Care Planning     Date: 2022    [facility]  I engaged the patient and family in a conversation about advance care planning and we specifically addressed what the goals of care would be moving forward, in light of the patient's change in clinical status, specifically end stage dementia, Failure to thrive.  We did specifically address the patient's likely prognosis, which is poor.  We explored the patient's values and preferences for future care.  The patient and family endorses that what is most important right now is to focus on avoiding the hospital,  symptom/pain control, quality of life, even if it means sacrificing a little time and comfort and QOL     Accordingly, we have decided that the best plan to meet the patient's goals includes enrolling in hospice care    I did explain the role for hospice care at this stage of the patient's illness, including its ability to help the patient live with the best quality of life possible.  We will be making a hospice referral.    I spent a total of 16 minutes engaging the patient in this advance care planning discussion.        \

## 2022-09-30 NOTE — HPI
"Per libertad, "Adalberto Medina is a 82 y.o. male with h/o HTN, recent subsegmental PE, legal blindness, T2DM, depression, and BPH who presented to [facility] on 9/27/2022 with a primary complaint of altered mental status.      Limited history able to be obtained from pt as he is unable to effectively answer questions or communicate outside of nodding head and occasional short phrases. Per chart review, pt with recent hospitalization earlier this month with similar presentation of encephalopathy in addition to hypoxia at that time. He was dx with a PE. AMS worked up without definitive answer and per notes, pt with minimal improvement in mental status and physical exam on discharge was "somnolent" and "Aox1 person. Requiring stimulation to rouse. Unable to respond to questions and commands this am."  Per those notations, prior to that admission he was A&Ox4.      Daughter initially able to speak with ED physician stating that he was A&O to person/place/time and most recently at his baseline able to discuss the [place] game as of this past Sunday. She did note that he has had significantly decreased po intake due to dysphagia and previously his ACP docs included that he did not want a PEG tube. Reported paperwork from [facility] concern change in mental status and possible aspiration PNA. ROS unable to be obtained due to pt's mental status.      In the ED, vitals significant for initial low BP then inc with good O2 saturation on RA. Labs significant for hypernatremia to 155, elevated troponin to 0.042. CT head negative for acute intracranial abnormality other than R cerumen impaction (vs soft tissue mass). CXR with elevation of R hemidiaphragm (stable from prior), severe emphysematous changes, and no focal consolidation. EKG with stable LBBB and PACs. He received 1L NS and 1L D5 as well as 1g Rocephin."  "

## 2022-09-30 NOTE — SUBJECTIVE & OBJECTIVE
Interval History: No acute events overnight.  Pt currently with NG tube placed. Pt denies any pain or acute distress. Pt requesting ice cream.     Past Medical History:   Diagnosis Date    Essential hypertension     Legal blindness     Preglaucoma     Type 2 diabetes mellitus        No past surgical history on file.    Review of patient's allergies indicates:  No Known Allergies    Medications:  Continuous Infusions:  Scheduled Meds:   apixaban  5 mg Oral BID    atorvastatin  20 mg Oral QHS    brimonidine 0.15 % OPTH DROP  1 drop Both Eyes BID    dorzolamide  1 drop Both Eyes BID    EScitalopram oxalate  5 mg Oral Daily    finasteride  5 mg Oral Daily    latanoprost  1 drop Both Eyes QHS    multivitamin  1 tablet Oral Daily    potassium, sodium phosphates  2 packet Oral TID    prednisoLONE acetate  1 drop Both Eyes QHS    tamsulosin  0.4 mg Oral QHS    thiamine  100 mg Oral Daily    timolol maleate 0.5%  1 drop Both Eyes BID     PRN Meds:sodium chloride 0.9%    Family History    None       Tobacco Use    Smoking status: Former    Smokeless tobacco: Never   Substance and Sexual Activity    Alcohol use: Not Currently    Drug use: Not Currently    Sexual activity: Not on file       Review of Systems   Unable to perform ROS: Acuity of condition   Objective:     Vital Signs (Most Recent):  Temp: 97.2 °F (36.2 °C) (09/30/22 1041)  Pulse: 98 (09/30/22 1558)  Resp: 18 (09/30/22 1041)  BP: 133/84 (09/30/22 1041)  SpO2: 100 % (09/30/22 1041) Vital Signs (24h Range):  Temp:  [96 °F (35.6 °C)-97.2 °F (36.2 °C)] 97.2 °F (36.2 °C)  Pulse:  [80-98] 98  Resp:  [16-18] 18  SpO2:  [99 %-100 %] 100 %  BP: (117-133)/(58-84) 133/84     Weight: 62.8 kg (138 lb 7.2 oz)  Body mass index is 19.31 kg/m².    Physical Exam  Constitutional:       General: He is not in acute distress.     Appearance: He is cachectic. He is ill-appearing (chronically).      Comments: Temporal wasting   NG Tube    HENT:      Head: Normocephalic.      Mouth/Throat:       Mouth: Mucous membranes are dry.   Cardiovascular:      Rate and Rhythm: Normal rate.      Pulses: Normal pulses.   Pulmonary:      Effort: Pulmonary effort is normal.      Breath sounds: Decreased breath sounds present.   Abdominal:      General: Abdomen is flat. Bowel sounds are normal.      Palpations: Abdomen is soft.   Skin:     Capillary Refill: Capillary refill takes less than 2 seconds.      Coloration: Skin is pale.      Comments: Sacral wound per chart   Neurological:      Mental Status: Mental status is at baseline. He is lethargic.      Motor: Weakness present.   Psychiatric:         Speech: Speech is delayed.         Cognition and Memory: Memory is impaired.       Review of Symptoms      Symptom Assessment (ESAS 0-10 Scale)  Pain:  0  Dyspnea:  0  Anxiety:  0  Nausea:  0  Depression:  0  Anorexia:  0  Fatigue:  0  Insomnia:  0  Restlessness:  0  Agitation:  0  Unable to complete assessment due to Acuity of condition     CAM / Delirium:  Negative  Constipation:  Negative      Pain Assessment in Advanced Demential Scale (PAINAD)   Breathing - Independent of vocalization:  0  Negative vocalization:  0  Facial expression:  1  Body language:  0  Consolability:  0  Total:  1    ECOG Performance Status rdGrdrrdarddrderd:rd rd3rd Living Arrangements:  Lives in nursing home    Psychosocial/Cultural: Pt was a resident of [facility].  Pt will dc to inpatient hospice facility.    Spiritual:  F - Roseanne and Belief:  Shinto   I - Importance:  Moderate   C - Community:  Family support   A - Address in Care:  Pastoral visits PRN      Time-Based Charting:  Yes  Chart Review: 19 minutes  Face to Face: 21 minutes  Symptom Assessment: 13 minutes  Coordination of Care: 14 minutes  Discharge Planning: 10 minutes  Advance Care Plannin minutes  Goals of Care: 8 minutes    Total Time Spent: 93 minutes      Advance Care Planning   Advance Directives:   Living Will: Yes        Copy on chart: Yes    LaPOST: No    Do Not  Resuscitate Status: Yes    Medical Power of : Yes    Agent's Name:  Hallie Franklin   Agent's Contact Number:  [phone]    Decision Making:  Patient answered questions and Family answered questions       Significant Labs: All pertinent labs within the past 24 hours have been reviewed.  CBC:   Recent Labs   Lab 09/30/22  0735   WBC 4.47   HGB 10.9*   HCT 34.6*   MCV 88        BMP:  Recent Labs   Lab 09/30/22  1233   *      K 4.4      CO2 28   BUN 20   CREATININE 0.7   CALCIUM 8.0*     LFT:  Lab Results   Component Value Date    AST 26 09/28/2022    ALKPHOS 107 09/28/2022    BILITOT 0.6 09/28/2022     Albumin:   Albumin   Date Value Ref Range Status   09/28/2022 2.5 (L) 3.5 - 5.2 g/dL Final     Protein:   Total Protein   Date Value Ref Range Status   09/28/2022 6.7 6.0 - 8.4 g/dL Final     Lactic acid:   Lab Results   Component Value Date    LACTATE 1.8 08/31/2022    LACTATE 1.8 04/20/2022       Significant Imaging: I have reviewed all pertinent imaging results/findings within the past 24 hours.

## 2022-09-30 NOTE — PLAN OF CARE
Problem: Adult Inpatient Plan of Care  Goal: Plan of Care Review  Outcome: Ongoing, Progressing   Chart reviewed.

## 2022-09-30 NOTE — PLAN OF CARE
AAOX1, Oriented to self only  VSS on RA   PIV intact  Voided adequate   2BM this shift  Meds given per order  Labs Q6 hr  Tube feeds infusing  FWF Q4 hrs done  Bed low, locked and call light in reach

## 2022-09-30 NOTE — PLAN OF CARE
Palliative team and Dr. Chavez with [facility] team met with pt's daughter Hallie at pt's bedside.  Hallie is agreeable to inpatient hospice.  Referrals sent to [facility].  Plan is to discharge Monday.       09/30/22 1611   Post-Acute Status   Post-Acute Authorization Hospice   Post-Acute Placement Status Referrals Sent   Hospice Status Referrals Sent     Kendell Eaton, RN   Supervisor Case Management-Jailyn  [phone]

## 2022-10-01 LAB
ALBUMIN SERPL BCP-MCNC: 2.3 G/DL (ref 3.5–5.2)
ALP SERPL-CCNC: 108 U/L (ref 55–135)
ALT SERPL W/O P-5'-P-CCNC: 21 U/L (ref 10–44)
ANION GAP SERPL CALC-SCNC: 8 MMOL/L (ref 8–16)
AST SERPL-CCNC: 25 U/L (ref 10–40)
BILIRUB SERPL-MCNC: 0.6 MG/DL (ref 0.1–1)
BUN SERPL-MCNC: 16 MG/DL (ref 8–23)
CALCIUM SERPL-MCNC: 7.6 MG/DL (ref 8.7–10.5)
CHLORIDE SERPL-SCNC: 106 MMOL/L (ref 95–110)
CO2 SERPL-SCNC: 29 MMOL/L (ref 23–29)
CREAT SERPL-MCNC: 0.6 MG/DL (ref 0.5–1.4)
EST. GFR  (NO RACE VARIABLE): >60 ML/MIN/1.73 M^2
GLUCOSE SERPL-MCNC: 78 MG/DL (ref 70–110)
PHOSPHATE SERPL-MCNC: 2.2 MG/DL (ref 2.7–4.5)
PHOSPHATE SERPL-MCNC: 2.4 MG/DL (ref 2.7–4.5)
PHOSPHATE SERPL-MCNC: 2.4 MG/DL (ref 2.7–4.5)
PHOSPHATE SERPL-MCNC: 2.5 MG/DL (ref 2.7–4.5)
POCT GLUCOSE: 108 MG/DL (ref 70–110)
POCT GLUCOSE: 137 MG/DL (ref 70–110)
POCT GLUCOSE: 87 MG/DL (ref 70–110)
POTASSIUM SERPL-SCNC: 4.4 MMOL/L (ref 3.5–5.1)
PROT SERPL-MCNC: 5.5 G/DL (ref 6–8.4)
SODIUM SERPL-SCNC: 143 MMOL/L (ref 136–145)

## 2022-10-01 PROCEDURE — 94761 N-INVAS EAR/PLS OXIMETRY MLT: CPT

## 2022-10-01 PROCEDURE — 84100 ASSAY OF PHOSPHORUS: CPT | Mod: 91 | Performed by: STUDENT IN AN ORGANIZED HEALTH CARE EDUCATION/TRAINING PROGRAM

## 2022-10-01 PROCEDURE — 84100 ASSAY OF PHOSPHORUS: CPT | Performed by: STUDENT IN AN ORGANIZED HEALTH CARE EDUCATION/TRAINING PROGRAM

## 2022-10-01 PROCEDURE — 25000003 PHARM REV CODE 250: Performed by: STUDENT IN AN ORGANIZED HEALTH CARE EDUCATION/TRAINING PROGRAM

## 2022-10-01 PROCEDURE — 80053 COMPREHEN METABOLIC PANEL: CPT | Performed by: STUDENT IN AN ORGANIZED HEALTH CARE EDUCATION/TRAINING PROGRAM

## 2022-10-01 PROCEDURE — 36415 COLL VENOUS BLD VENIPUNCTURE: CPT | Performed by: STUDENT IN AN ORGANIZED HEALTH CARE EDUCATION/TRAINING PROGRAM

## 2022-10-01 PROCEDURE — 99900035 HC TECH TIME PER 15 MIN (STAT)

## 2022-10-01 PROCEDURE — 11000001 HC ACUTE MED/SURG PRIVATE ROOM

## 2022-10-01 RX ADMIN — BRIMONIDINE TARTRATE 1 DROP: 1.5 SOLUTION OPHTHALMIC at 08:10

## 2022-10-01 RX ADMIN — TAMSULOSIN HYDROCHLORIDE 0.4 MG: 0.4 CAPSULE ORAL at 08:10

## 2022-10-01 RX ADMIN — TIMOLOL MALEATE 1 DROP: 5 SOLUTION/ DROPS OPHTHALMIC at 08:10

## 2022-10-01 RX ADMIN — LATANOPROST 1 DROP: 50 SOLUTION OPHTHALMIC at 08:10

## 2022-10-01 RX ADMIN — POTASSIUM & SODIUM PHOSPHATES POWDER PACK 280-160-250 MG 2 PACKET: 280-160-250 PACK at 08:10

## 2022-10-01 RX ADMIN — DORZOLAMIDE HYDROCHLORIDE 1 DROP: 20 SOLUTION/ DROPS OPHTHALMIC at 08:10

## 2022-10-01 RX ADMIN — Medication 100 MG: at 08:10

## 2022-10-01 RX ADMIN — APIXABAN 5 MG: 5 TABLET, FILM COATED ORAL at 08:10

## 2022-10-01 RX ADMIN — ESCITALOPRAM OXALATE 5 MG: 5 TABLET, FILM COATED ORAL at 08:10

## 2022-10-01 RX ADMIN — ATORVASTATIN CALCIUM 20 MG: 20 TABLET, FILM COATED ORAL at 08:10

## 2022-10-01 RX ADMIN — PREDNISOLONE ACETATE 1 DROP: 10 SUSPENSION/ DROPS OPHTHALMIC at 08:10

## 2022-10-01 RX ADMIN — FINASTERIDE 5 MG: 5 TABLET, FILM COATED ORAL at 08:10

## 2022-10-01 RX ADMIN — THERA TABS 1 TABLET: TAB at 08:10

## 2022-10-01 NOTE — PLAN OF CARE
Problem: Adult Inpatient Plan of Care  Goal: Plan of Care Review  Outcome: Ongoing, Progressing  Goal: Patient-Specific Goal (Individualized)  Outcome: Ongoing, Progressing  Goal: Absence of Hospital-Acquired Illness or Injury  Outcome: Ongoing, Progressing  Goal: Optimal Comfort and Wellbeing  Outcome: Ongoing, Progressing  Goal: Readiness for Transition of Care  Outcome: Ongoing, Progressing   Pt in room with no s/s of distress. Pt turned and repositioned. Pt tolerated thin liquids well overnight and meds crushed in puree. Pt with more conversation as well overnight.

## 2022-10-01 NOTE — PROGRESS NOTES
[facility] Internal Medicine Progress Note    Primary Team: [facility] Hospital Medicine B  Attending Physician: Chandana Joyce MD  Resident: Miguel Torre MD  Intern: Mikaela Shah MD     Subjective:      Pt is resting in bed. Opens eyes to voice but pt is unable to vocalize in interview this AM. Denied any pain or discomfort at this time.      Objective:   Last 24 Hour Vital Signs:  BP  Min: 115/55  Max: 135/62  Temp  Av.7 °F (37.1 °C)  Min: 97.2 °F (36.2 °C)  Max: 99.6 °F (37.6 °C)  Pulse  Av.4  Min: 80  Max: 99  Resp  Av  Min: 17  Max: 19  SpO2  Av.4 %  Min: 97 %  Max: 100 %  I/O last 3 completed shifts:  In: 600 [NG/GT:600]  Out: 700 [Urine:700]    Physical Examination:  General: cachetic appearing, opens eyes to voce but unable to verbalize  HENT: temporal wasting, EOMI, no scleral icterus, dry MM, no nasal discharge  Neck: no obvious goiter or masses  CV: RRR; no murmurs, rubs, or gallops. Normal s1, s2  Resp: CTAB with normal work of breathing and chest excursion. No rhonchi, rales, or wheezing appreciated  Abd: Soft, NTND. Normoactive BS x4.    MSK: +2 radial pulses b/l. No edema, cyanosis, or erythema noted on extremities.   Neuro: somnolent but wakes to voice. Is able to shake head in response to some questions.   Skin: skin is warm and dry. Bilateral feet wrapped in guaze      Laboratory:  Laboratory Data   Recent Results (from the past 12 hour(s))   Phosphorus    Collection Time: 10/01/22 12:00 AM   Result Value Ref Range    Phosphorus 2.2 (L) 2.7 - 4.5 mg/dL   POCT glucose    Collection Time: 10/01/22  4:57 AM   Result Value Ref Range    POCT Glucose 87 70 - 110 mg/dL       Microbiology Data   Blood Culture, Routine   Date Value Ref Range Status   2022 No Growth to date  Preliminary   2022 No Growth to date  Preliminary   2022 No Growth to date  Preliminary   2022 No Growth to date  Preliminary       Other Results:    Radiology Data   No interval imaging.     Current  Medications:     Infusions: none         Scheduled:   apixaban  5 mg Oral BID    atorvastatin  20 mg Oral QHS    brimonidine 0.15 % OPTH DROP  1 drop Both Eyes BID    dorzolamide  1 drop Both Eyes BID    EScitalopram oxalate  5 mg Oral Daily    finasteride  5 mg Oral Daily    latanoprost  1 drop Both Eyes QHS    multivitamin  1 tablet Oral Daily    prednisoLONE acetate  1 drop Both Eyes QHS    tamsulosin  0.4 mg Oral QHS    thiamine  100 mg Oral Daily    timolol maleate 0.5%  1 drop Both Eyes BID        PRN:  sodium chloride 0.9%    Assessment:     Adalberto Medina is a 82 y.o.male with h/o HTN, recent subsegmental PE, legal blindness, T2DM, depression, and BPH who presented to [facility] on 9/27/2022 with a primary complaint of altered mental status found to be dehydrated and hypernatremic. Na improved and will continue enteral free water to 250 q4hrs and d/c D5W gtt. Given pt's slow improvement and unclear if pt will ever return to baseline function, palliative care was consulted. After discussion with family, pt was transitioned to hospice and comfort care. Plan to transfer to a hospice facility early next week.     Plan:     Acute on chronic Encephalopathy  Dementia   - Most likely in setting of hyperNa   - Recent meds started were eliquis and lexapro (more likely to cause hypoNa/SIADH)  - CT head without evidence of infarct or bleed, possible impacted cerumen vs soft tissue mass. On exam, evident that b/l ears are impacted with cerumen and will require disimpaction on OP basis by ENT.   - Pt with similar presentation a few weeks ago, waxing and waning mental status, per last discharge pt was lethargic and A&Ox1 but per daughter's report to ED physician more with it   - 10/1: Pt is somnolent on exam. Yesterday family decided to place pt on hospice care. Case management set up a placement for pt at a Hospice House in [facility] for Monday (10/3). On 10/3 pt will need a negative covid test  prior to d/c.        Hypernatremia and Dehydration 2/2 Dec Po Intake:   Failure to Thrive:   - Pt reportedly not eating at [facility] and issues with dysphagia during last admit   - S/p 1L NS and 1L d5 in the ED, recheck BMP as pt had 2.2L free water deficit (to get to 145 Na) on admission   - NG for enteral replacement in pt with encephalopathy  - ST consult   - Na initally 155->159. Was corrected slowly over two days with d5 and free water flushs in NGT. Now around 143  - Pt with documented weight of 55.6kg, that is 7.5kg weight loss in 3 weeks (~12% BW in 3 weeks)   - Per speech, pleasure feeding (pureed for comfort) and clears   - 10/1: Sodium has normalized, will hold of on daily CMP checks at this time.     Elevated Troponin, stable:  - EKG stable with known LBBB  - Troponin stable     PE:  - Continue Eliquis 5mg BID  - Recently dx at last hospital stay      HTN:  - BP initially low but inc after fluids  - Home meds include losartan 100mg daily,  - BP stable at this time, will hold off on any medications at this time.     Severe Malnutrition:  - Pt unable to adequately take po for nourishment, and had standing appt with GI for placement of PEG but missed due to inability to acquire transport   - NG tube placement and initiate tube feeds  - Nutrition consulted for optimization   - 10/1: NG tube removed, will continue pleasure feedings as tolerated.      T2DM:  - Initially initiated enteral feeds and follow labs for refeeding   - Nutrition consulted  - NG tube removed and pt now on pleasure feeds.   - Will hold glucose checks at this time.     Legally Blind  - Continue ophthalmic drops (timolol and brimonidine)    Depression:  - Cont home lexapro      BPH:  - Continue home flomax and proscar      Wounds, present on admission  - Consulted wound care   - Frequent turns    Psychosis:  - H/o psychosis on risperidone 1mg qam and 2mg qhs - will hold in setting of encephalopathy and hyperNa      Diet:  Pleasure feeds as tolerated   DVT PPX: Eliquis   Dispo: Pending improvement in dehydration/hyperNa and nutrition   CODE: DNR    Mikaela Shah MD  Medicine - Pediatrics, PGY1  [facility] Hospitalist Team B    [facility] Medicine Hospitalist Pager numbers:   [facility] Hospitalist Medicine Team A (Petra/Demetrio): [phone]  [facility] Hospitalist Medicine Team B (Isiah/Ranjana):  [phone]

## 2022-10-01 NOTE — PLAN OF CARE
"1220  CM was informed by [facility] that the patient is medically stable to discharge to in-pt hospice. Referrals sent to [facility] & [facility] via [facility] yesterday. JOAO paged on-call nurse for both facilities. Awaiting response.      1245  CM received a return call from Ashley ([phone]) /[facility]. Referral manually faxed (f [phone]) to [facility] as requested. Awaiting response.     1320  CM received a return call from Ray ([phone]) [facility]. Ray stated that they would have a bed available on [place] 10/2/2022.    1340  Patient resting quietly in bed when CM rounded. No family present.     1345  CM informed the pt's daughter, Hallie Franklin ([phone]), via phone of the hospice referrals sent & possible discharge this weekend. Hallie verbalized understanding & agreement.     1400  CM was informed nurse by [facility] that the patient will not be accepted because he does not qualify for in-pt hospice. Awaiting decision from [facility].    1420  CM was informed by Ray w/[facility] that the patient will not be accepted because he does not qualify for in-pt hospice however Ray stated that the patient does qualify for their "Hospice House" ([address] [place] [address]), that they do not accept pts over the weekend, that she will submit a referral on Monday, & that the patient will need to have a covid test done and home hospice orders sent to [facility] on Monday.    JOAO informed [facility] B & the patient's daughter of above. Daughter verbalized understanding, agreement, & appreciation.   "

## 2022-10-02 LAB
PHOSPHATE SERPL-MCNC: 2.2 MG/DL (ref 2.7–4.5)
PHOSPHATE SERPL-MCNC: 2.3 MG/DL (ref 2.7–4.5)
POCT GLUCOSE: 112 MG/DL (ref 70–110)
POCT GLUCOSE: 117 MG/DL (ref 70–110)
POCT GLUCOSE: 127 MG/DL (ref 70–110)
POCT GLUCOSE: 147 MG/DL (ref 70–110)
POCT GLUCOSE: 80 MG/DL (ref 70–110)
POCT GLUCOSE: 89 MG/DL (ref 70–110)

## 2022-10-02 PROCEDURE — 27000221 HC OXYGEN, UP TO 24 HOURS

## 2022-10-02 PROCEDURE — 84100 ASSAY OF PHOSPHORUS: CPT | Mod: 91 | Performed by: STUDENT IN AN ORGANIZED HEALTH CARE EDUCATION/TRAINING PROGRAM

## 2022-10-02 PROCEDURE — 99900035 HC TECH TIME PER 15 MIN (STAT)

## 2022-10-02 PROCEDURE — 11000001 HC ACUTE MED/SURG PRIVATE ROOM

## 2022-10-02 PROCEDURE — 36415 COLL VENOUS BLD VENIPUNCTURE: CPT | Performed by: STUDENT IN AN ORGANIZED HEALTH CARE EDUCATION/TRAINING PROGRAM

## 2022-10-02 PROCEDURE — 94761 N-INVAS EAR/PLS OXIMETRY MLT: CPT

## 2022-10-02 PROCEDURE — 25000003 PHARM REV CODE 250: Performed by: STUDENT IN AN ORGANIZED HEALTH CARE EDUCATION/TRAINING PROGRAM

## 2022-10-02 PROCEDURE — 84100 ASSAY OF PHOSPHORUS: CPT | Performed by: STUDENT IN AN ORGANIZED HEALTH CARE EDUCATION/TRAINING PROGRAM

## 2022-10-02 PROCEDURE — 25000003 PHARM REV CODE 250

## 2022-10-02 RX ORDER — LIDOCAINE 50 MG/G
1 PATCH TOPICAL
Status: DISCONTINUED | OUTPATIENT
Start: 2022-10-02 | End: 2022-10-03 | Stop reason: HOSPADM

## 2022-10-02 RX ORDER — ACETAMINOPHEN 325 MG/1
650 TABLET ORAL EVERY 6 HOURS PRN
Status: DISCONTINUED | OUTPATIENT
Start: 2022-10-02 | End: 2022-10-03 | Stop reason: HOSPADM

## 2022-10-02 RX ADMIN — APIXABAN 5 MG: 5 TABLET, FILM COATED ORAL at 08:10

## 2022-10-02 RX ADMIN — LATANOPROST 1 DROP: 50 SOLUTION OPHTHALMIC at 08:10

## 2022-10-02 RX ADMIN — DORZOLAMIDE HYDROCHLORIDE 1 DROP: 20 SOLUTION/ DROPS OPHTHALMIC at 08:10

## 2022-10-02 RX ADMIN — TIMOLOL MALEATE 1 DROP: 5 SOLUTION/ DROPS OPHTHALMIC at 08:10

## 2022-10-02 RX ADMIN — BRIMONIDINE TARTRATE 1 DROP: 1.5 SOLUTION OPHTHALMIC at 08:10

## 2022-10-02 RX ADMIN — ACETAMINOPHEN 650 MG: 325 TABLET ORAL at 08:10

## 2022-10-02 RX ADMIN — FINASTERIDE 5 MG: 5 TABLET, FILM COATED ORAL at 08:10

## 2022-10-02 RX ADMIN — ESCITALOPRAM OXALATE 5 MG: 5 TABLET, FILM COATED ORAL at 08:10

## 2022-10-02 RX ADMIN — Medication 100 MG: at 08:10

## 2022-10-02 RX ADMIN — THERA TABS 1 TABLET: TAB at 08:10

## 2022-10-02 RX ADMIN — PREDNISOLONE ACETATE 1 DROP: 10 SUSPENSION/ DROPS OPHTHALMIC at 08:10

## 2022-10-02 RX ADMIN — TAMSULOSIN HYDROCHLORIDE 0.4 MG: 0.4 CAPSULE ORAL at 08:10

## 2022-10-02 NOTE — PROGRESS NOTES
hospitals Internal Medicine Progress Note    Primary Team: hospitals Hospital Medicine B  Attending Physician: Chandana Joyce MD  Resident: Miguel Torre MD  Intern: Mikaela Shah MD     Subjective:      Resting in bed, sleeping comfortably. Per nursing at bedside no issues overnight, pt still taking PO meds.     Objective:   Last 24 Hour Vital Signs:  BP  Min: 126/58  Max: 142/68  Temp  Av.2 °F (36.2 °C)  Min: 96.9 °F (36.1 °C)  Max: 98 °F (36.7 °C)  Pulse  Av.7  Min: 82  Max: 95  Resp  Av.8  Min: 17  Max: 18  SpO2  Av.4 %  Min: 96 %  Max: 100 %  I/O last 3 completed shifts:  In: 123 [P.O.:123]  Out: 300 [Urine:300]    Physical Examination:  General: cachetic appearing, opens eyes to voice but unable to verbalize  HENT: temporal wasting, EOMI, no scleral icterus, dry MM, no nasal discharge  Neck: no obvious goiter or masses  CV: RRR; no murmurs  Resp: nwob on ra, no cough  Abd: Soft, NTND. Normoactive BS x4.    MSK: +2 radial pulses b/l. No edema, cyanosis, or erythema noted on extremities.   Neuro: somnolent but wakes to voice. Non-participatory  Skin: skin is warm and dry. Bilateral feet wrapped in guaze      Laboratory:  Laboratory Data   Recent Results (from the past 12 hour(s))   Phosphorus    Collection Time: 10/02/22 12:12 AM   Result Value Ref Range    Phosphorus 2.3 (L) 2.7 - 4.5 mg/dL   POCT glucose    Collection Time: 10/02/22  5:07 AM   Result Value Ref Range    POCT Glucose 89 70 - 110 mg/dL   POCT glucose    Collection Time: 10/02/22  8:31 AM   Result Value Ref Range    POCT Glucose 80 70 - 110 mg/dL       Microbiology Data   Blood Culture, Routine   Date Value Ref Range Status   2022 No Growth to date  Preliminary   2022 No Growth to date  Preliminary   2022 No Growth to date  Preliminary   2022 No Growth to date  Preliminary   2022 No Growth to date  Preliminary       Other Results:    Radiology Data   No interval imaging.     Current Medications:     Infusions:  none         Scheduled:   apixaban  5 mg Oral BID    brimonidine 0.15 % OPTH DROP  1 drop Both Eyes BID    dorzolamide  1 drop Both Eyes BID    finasteride  5 mg Oral Daily    latanoprost  1 drop Both Eyes QHS    prednisoLONE acetate  1 drop Both Eyes QHS    tamsulosin  0.4 mg Oral QHS    timolol maleate 0.5%  1 drop Both Eyes BID        PRN:  sodium chloride 0.9%    Assessment:     Adalberto Medina is a 82 y.o.male with h/o HTN, recent subsegmental PE, legal blindness, T2DM, depression, and BPH who presented to [facility] on 9/27/2022 with a primary complaint of altered mental status found to be dehydrated and hypernatremic. Na improved and will continue enteral free water to 250 q4hrs and d/c D5W gtt. Given pt's slow improvement and unclear if pt will ever return to baseline function, palliative care was consulted. After discussion with family, pt was transitioned to hospice and comfort care. Plan to transfer to a hospice facility early next week.     Plan:     Acute on chronic Encephalopathy  Dementia   - Most likely in setting of hyperNa   - Recent meds started were eliquis and lexapro (more likely to cause hypoNa/SIADH)  - CT head without evidence of infarct or bleed, possible impacted cerumen vs soft tissue mass. On exam, evident that b/l ears are impacted with cerumen and will require disimpaction on OP basis by ENT.   - Pt with similar presentation a few weeks ago, waxing and waning mental status, per last discharge pt was lethargic and A&Ox1 but per daughter's report to ED physician more with it   - 10/1: Pt is somnolent on exam. Yesterday family decided to place pt on hospice care. Case management set up a placement for pt at a Hospice [facility] in [facility] for Monday (10/3). On 10/3 pt will need a negative covid test prior to d/c.   10/2- no interval change. Dc'd lipitor, vitamins, lexapro as pt hospice. Kept flomax/finasteride for comfort.       Hypernatremia and Dehydration 2/2 Dec  Po Intake:   Failure to Thrive:   - Pt reportedly not eating at [facility] and issues with dysphagia during last admit   - S/p 1L NS and 1L d5 in the ED, recheck BMP as pt had 2.2L free water deficit (to get to 145 Na) on admission   - NG for enteral replacement in pt with encephalopathy  - ST consult   - Na initally 155->159. Was corrected slowly over two days with d5 and free water flushs in NGT. Now around 143  - Pt with documented weight of 55.6kg, that is 7.5kg weight loss in 3 weeks (~12% BW in 3 weeks)   - Per speech, pleasure feeding (pureed for comfort) and clears   - 10/1: Sodium has normalized, will hold of on daily CMP checks at this time.     Elevated Troponin, stable:  - EKG stable with known LBBB  - Troponin stable     PE:  - Continue Eliquis 5mg BID  - Recently dx at last hospital stay      HTN:  - BP initially low but inc after fluids  - Home meds include losartan 100mg daily,  - BP stable at this time, will hold off on any medications at this time.     Severe Malnutrition:  - Pt unable to adequately take po for nourishment, and had standing appt with GI for placement of PEG but missed due to inability to acquire transport   - NG tube placement and initiate tube feeds  - Nutrition consulted for optimization   - 10/1: NG tube removed, will continue pleasure feedings as tolerated.      T2DM:  - Initially initiated enteral feeds and follow labs for refeeding   - Nutrition consulted  - NG tube removed and pt now on pleasure feeds.   - Will hold glucose checks at this time.     Legally Blind  - Continue ophthalmic drops (timolol and brimonidine)    Depression:  - Cont home lexapro      BPH:  - Continue home flomax and proscar      Wounds, present on admission  - Consulted wound care   - Frequent turns    Psychosis:  - H/o psychosis on risperidone 1mg qam and 2mg qhs - will hold in setting of encephalopathy and hyperNa      Diet: Pleasure feeds as tolerated   DVT PPX: Eliquis   Dispo: Hospice,  needs COVID test Monday  CODE: DNR    Will MD Mau  [facility] Hospitalist Team B    [facility] Medicine Hospitalist Pager numbers:   [facility] Hospitalist Medicine Team A (Petra/Demetrio):          [phone]  [facility] Hospitalist Medicine Team B (Isiah/Ranjana):        [phone]

## 2022-10-02 NOTE — NURSING
Shift assessment complete. Pt is resting in bed with eyes closed. Oriented x 1. Repositioned in bed. Bed in lowest position, locked, and side rails up x 3. Bed alarm on. Call light in reach. Door open.

## 2022-10-03 VITALS
DIASTOLIC BLOOD PRESSURE: 59 MMHG | HEIGHT: 71 IN | OXYGEN SATURATION: 100 % | WEIGHT: 138.44 LBS | TEMPERATURE: 96 F | HEART RATE: 84 BPM | RESPIRATION RATE: 16 BRPM | SYSTOLIC BLOOD PRESSURE: 124 MMHG | BODY MASS INDEX: 19.38 KG/M2

## 2022-10-03 LAB
BACTERIA BLD CULT: NORMAL
BACTERIA BLD CULT: NORMAL
PHOSPHATE SERPL-MCNC: 2.2 MG/DL (ref 2.7–4.5)
PHOSPHATE SERPL-MCNC: 2.2 MG/DL (ref 2.7–4.5)
PHOSPHATE SERPL-MCNC: 2.3 MG/DL (ref 2.7–4.5)
POCT GLUCOSE: 107 MG/DL (ref 70–110)
POCT GLUCOSE: 107 MG/DL (ref 70–110)
POCT GLUCOSE: 116 MG/DL (ref 70–110)
SARS-COV-2 RDRP RESP QL NAA+PROBE: NEGATIVE

## 2022-10-03 PROCEDURE — 99233 PR SUBSEQUENT HOSPITAL CARE,LEVL III: ICD-10-PCS | Mod: ,,,

## 2022-10-03 PROCEDURE — 84100 ASSAY OF PHOSPHORUS: CPT | Performed by: STUDENT IN AN ORGANIZED HEALTH CARE EDUCATION/TRAINING PROGRAM

## 2022-10-03 PROCEDURE — 99900035 HC TECH TIME PER 15 MIN (STAT)

## 2022-10-03 PROCEDURE — 94761 N-INVAS EAR/PLS OXIMETRY MLT: CPT

## 2022-10-03 PROCEDURE — 36415 COLL VENOUS BLD VENIPUNCTURE: CPT | Performed by: STUDENT IN AN ORGANIZED HEALTH CARE EDUCATION/TRAINING PROGRAM

## 2022-10-03 PROCEDURE — 27000221 HC OXYGEN, UP TO 24 HOURS

## 2022-10-03 PROCEDURE — 99233 SBSQ HOSP IP/OBS HIGH 50: CPT | Mod: ,,,

## 2022-10-03 PROCEDURE — 25000003 PHARM REV CODE 250: Performed by: STUDENT IN AN ORGANIZED HEALTH CARE EDUCATION/TRAINING PROGRAM

## 2022-10-03 PROCEDURE — U0002 COVID-19 LAB TEST NON-CDC: HCPCS

## 2022-10-03 RX ADMIN — FINASTERIDE 5 MG: 5 TABLET, FILM COATED ORAL at 08:10

## 2022-10-03 RX ADMIN — APIXABAN 5 MG: 5 TABLET, FILM COATED ORAL at 08:10

## 2022-10-03 RX ADMIN — TIMOLOL MALEATE 1 DROP: 5 SOLUTION/ DROPS OPHTHALMIC at 08:10

## 2022-10-03 RX ADMIN — BRIMONIDINE TARTRATE 1 DROP: 1.5 SOLUTION OPHTHALMIC at 08:10

## 2022-10-03 RX ADMIN — DORZOLAMIDE HYDROCHLORIDE 1 DROP: 20 SOLUTION/ DROPS OPHTHALMIC at 08:10

## 2022-10-03 NOTE — PLAN OF CARE
I spoke with Ray with [facility] [phone].  She sent the referral to [facility] and is waiting on a response.  She also has her director of nursing coming here this morning to meet this pt to see if he is a candidate for their inpatient hospice unit.  I spoke with Ashley with [facility] and she says pt does not meet IP criteria.  Waiting on Ray to call back.  I spoke with pt's daughter Hallie and she prefers to hear back from [facility] prior to making any other decisions regarding discharge dispo.  I will follow-up shortly.       10/03/22 0930   Post-Acute Status   Post-Acute Authorization Hospice     Kendell Eaton, RN   Supervisor Case Management-Jailyn  [phone]

## 2022-10-03 NOTE — PROGRESS NOTES
[facility] Internal Medicine Progress Note    Primary Team: [facility] Hospital Medicine B  Attending Physician: Chandana Joyce MD  Resident: Miguel Torre MD  Intern: Mikaela Shah MD     Subjective:   No acute events overnight. Pt lying comfortably in bed. Opens eyes to voice. Does not seem to be in any acute stress. On room air     Objective:   Last 24 Hour Vital Signs:  BP  Min: 120/56  Max: 142/68  Temp  Av.5 °F (36.4 °C)  Min: 96.9 °F (36.1 °C)  Max: 97.9 °F (36.6 °C)  Pulse  Av.3  Min: 82  Max: 100  Resp  Av.6  Min: 16  Max: 18  SpO2  Av.5 %  Min: 93 %  Max: 100 %  I/O last 3 completed shifts:  In: 248 [P.O.:248]  Out: 400 [Urine:400]    Physical Examination:  General: cachetic appearing, opens eyes to voice but unable to verbalize  HENT: temporal wasting, EOMI, no scleral icterus, dry MM, no nasal discharge  Neck: no obvious goiter or masses  CV: RRR; no murmurs  Resp: nwob on ra, no cough  Abd: Soft, NTND. Normoactive BS x4.    MSK: +2 radial pulses b/l. No edema, cyanosis, or erythema noted on extremities.   Neuro: somnolent but wakes to voice. Non-participatory  Skin: skin is warm and dry. Bilateral feet wrapped in guaze      Laboratory:  Laboratory Data   Recent Results (from the past 12 hour(s))   POCT glucose    Collection Time: 10/02/22  8:09 PM   Result Value Ref Range    POCT Glucose 127 (H) 70 - 110 mg/dL   Phosphorus    Collection Time: 10/02/22 11:31 PM   Result Value Ref Range    Phosphorus 2.2 (L) 2.7 - 4.5 mg/dL   POCT glucose    Collection Time: 10/02/22 11:38 PM   Result Value Ref Range    POCT Glucose 117 (H) 70 - 110 mg/dL   POCT glucose    Collection Time: 10/03/22  4:59 AM   Result Value Ref Range    POCT Glucose 107 70 - 110 mg/dL   Phosphorus    Collection Time: 10/03/22  6:08 AM   Result Value Ref Range    Phosphorus 2.2 (L) 2.7 - 4.5 mg/dL       Microbiology Data   Blood Culture, Routine   Date Value Ref Range Status   2022 No growth after 5 days.  Final       Other  Results:    Radiology Data   No interval imaging.     Current Medications:     Infusions: none         Scheduled:   apixaban  5 mg Oral BID    brimonidine 0.15 % OPTH DROP  1 drop Both Eyes BID    dorzolamide  1 drop Both Eyes BID    finasteride  5 mg Oral Daily    latanoprost  1 drop Both Eyes QHS    LIDOcaine  1 patch Transdermal Q24H    prednisoLONE acetate  1 drop Both Eyes QHS    tamsulosin  0.4 mg Oral QHS    timolol maleate 0.5%  1 drop Both Eyes BID        PRN:  acetaminophen, sodium chloride 0.9%    Assessment:     Adalberto Medina is a 82 y.o.male with h/o HTN, recent subsegmental PE, legal blindness, T2DM, depression, and BPH who presented to [facility] on 9/27/2022 with a primary complaint of altered mental status found to be dehydrated and hypernatremic. Na improved and will continue enteral free water to 250 q4hrs and d/c D5W gtt. Given pt's slow improvement and unclear if pt will ever return to baseline function, palliative care was consulted. After discussion with family, pt was transitioned to hospice and comfort care. Plan to transfer to a hospice facility today    Plan:     Acute on chronic Encephalopathy  Dementia   - Most likely in setting of hyperNa   - Recent meds started were eliquis and lexapro (more likely to cause hypoNa/SIADH)  - CT head without evidence of infarct or bleed, possible impacted cerumen vs soft tissue mass. On exam, evident that b/l ears are impacted with cerumen and will require disimpaction on OP basis by ENT.   - Pt with similar presentation a few weeks ago, waxing and waning mental status, per last discharge pt was lethargic and A&Ox1 but per daughter's report to ED physician more with it   - 10/1: Pt is somnolent on exam. Yesterday family decided to place pt on hospice care. Case management set up a placement for pt at a [facility] in [facility] for Monday (10/3). On 10/3 pt will need a negative covid test prior to d/c.   10/2- no interval  change. Dc'd lipitor, vitamins, lexapro as pt hospice. Kept flomax/finasteride for comfort.  -10/3: No interval changes. PT to go to hospice facilty today. Covid test ordered.       Hypernatremia and Dehydration 2/2 Dec Po Intake:   Failure to Thrive:   - Pt reportedly not eating at [facility] and issues with dysphagia during last admit   - S/p 1L NS and 1L d5 in the ED, recheck BMP as pt had 2.2L free water deficit (to get to 145 Na) on admission   - NG for enteral replacement in pt with encephalopathy  - ST consult   - Na initally 155->159. Was corrected slowly over two days with d5 and free water flushs in NGT. Now around 143  - Pt with documented weight of 55.6kg, that is 7.5kg weight loss in 3 weeks (~12% BW in 3 weeks)   - Per speech, pleasure feeding (pureed for comfort) and clears   - 10/1: Sodium has normalized, will hold of on daily CMP checks at this time.     Elevated Troponin, stable:  - EKG stable with known LBBB  - Troponin stable     PE:  - Continue Eliquis 5mg BID  - Recently dx at last hospital stay      HTN:  - BP initially low but inc after fluids  - Home meds include losartan 100mg daily,  - BP stable at this time, will hold off on any medications at this time.     Severe Malnutrition:  - Pt unable to adequately take po for nourishment, and had standing appt with GI for placement of PEG but missed due to inability to acquire transport   - NG tube placement and initiate tube feeds  - Nutrition consulted for optimization   - 10/1: NG tube removed, will continue pleasure feedings as tolerated.      T2DM:  - Initially initiated enteral feeds and follow labs for refeeding   - Nutrition consulted  - NG tube removed and pt now on pleasure feeds.   - Will hold glucose checks at this time.     Legally Blind  - Continue ophthalmic drops (timolol and brimonidine). May be able to discontinue on discharge    Depression:  - Cont home lexapro      BPH:  - Continue home flomax and proscar       Wounds, present on admission  - Consulted wound care   - Frequent turns    Psychosis:  - H/o psychosis on risperidone 1mg qam and 2mg qhs - will hold in setting of encephalopathy and hyperNa      Diet: Pleasure feeds as tolerated   DVT PPX: Eliquis   Dispo: Hospice today  CODE: DNR    Mikaela Shah MD  Medicine-Pediatrics, PGY1    [facility] Medicine Hospitalist Pager numbers:   [facility] Hospitalist Medicine Team A (Petra/Demetrio):          [phone]  [facility] Hospitalist Medicine Team B (Isiah/Ranjana):        [phone]

## 2022-10-03 NOTE — PROGRESS NOTES
"[facility]  Palliative Medicine  Progress Note    Patient Name: Adalberto Medina  MRN: [id number]  Admission Date: 9/27/2022  Hospital Length of Stay: 5 days  Code Status: DNR   Attending Provider: Chandana Joyce MD  Consulting Provider: Patria Fontenot NP  Primary Care Physician: Primary Doctor No  Principal Problem:Hypernatremia    Patient information was obtained from past medical records and primary team.      Assessment/Plan:     Palliative care encounter  10/3/22  At time of follow up visit pt resting in bed.  Pt opens eyes to voice but does not reply verbally. [facility] has no availability at this time. [facility] pt does not meet the in patient criteria at this point in time.  However, pt does meet the criteria for their Hospice House.  Pt is first on their waiting list.  New plan is for pt to d/c back to [facility] under hospice until the bed becomes available at [facility].  Per CM notes, daughter is in agreement with this plan.     9/30/ 22  At time of initial consult, pt laying in bed. No family at bedside.  Pt able to state his daughters name correctly.  Pt able to respond with a few one word answers when given ample time to form his word.  Pt endorses that he likes to watch "football" when asked what he like to watch on tv.  Pt is a resident of [facility].  PT is .    Reached out to pts daughter, Hallie Franklin, for collateral information.  Per Hallie, pt began to steadily decline over the past three months.  I expressed that this may be the patients new baseline and the pts daughter agrees with this.  Pt has been bed/ chair bound for the last year. Per daughter, she believed that the pts recent struggle has been the pts lack of nutritional intake.  Hallie believe that the pt is not being fed approprietly at the NH and thus has been requesting a PEG tube.  Per daughter, pt was to receive a PEG tube this week but the appointment was rescheduled.      Hallie " "associated Hospice ( as apparently this option has been brought up at the NH) as starving the pt to death. We discussed the theory and goals behind hospice.  Hallie expressed she has never actually had a true discussion about hospice and appriciated the information.    We discussed what the pt valued in life- pts daughter identified travel and eating.  We discussed what she thought the pt would express about the PEG tube placement if he could tell us.  Hallie is unsure what he would want but she expresses that she just wants to try to include him in the decision making process.  Hallie reports that she feels torn.  She expresses that she understands that the PEG tube will not fix her father and may ultimately prolong his decline and death , but feels like if she opts to not place the PEG tube she is "opting to starve her father to death." We discussed the body's natural process of slowing down as well.    Pts daughter to come to hospital this afternoon and we will have an in person meeting in an attempt to include the pt in medical decision making and establish goals of care.     Meeting was held at the bedside with the pt, his daughter Hallie, palliative medicine team, Kendell with Case management and Dr Chavez.  Goals of care were discussed with a focus on PEG tube placement.  After discussion, Hallie ultimately decided that she would like to show compassion to her father at the end of his life and offer him pleasure feeds and forgo any invasive procedures including PEG tube placement.  When asked about feeding tubes  the patient responded, " take it out".  Hallie endorsed that she wanted to support her fathers wishes even if this shortens his life to some degree. Hallie showed appropriate insight into his medical condition and decision making.     We discussed hospice enrollment moving forward. Pts daughter is agreeable to hospice and feels this option aligns with what her father would want. This is the pts [facility] hospital " "admission since 5/2022 for similar etiologies. Pts FTT and steady decline are apparent to the Hallie and she endorses that each hospital admission has gotten harder on her father and she no longer sees improvements after admissions.   Hallie would like to enroll her father in inpatient hospice placement.    Disucssed pt with the Director of Hospice and Palliative Medicine Dr Christianson.  Pts prognosis of 2-4 weeks in the absence of artifical food and nutrition.  Pt is hospice qualified.          Subjective:     Chief Complaint:   Chief Complaint   Patient presents with    Altered Mental Status     Pt presents to ED today via [facility] for AMS x several days, possible aspiration pneumonia, and failure to thrive   Daughter consulting with pt's physician's for PEG tube placement        HPI:   [facility]Adalberto Medina is a 82 y.o. male with h/o HTN, recent subsegmental PE, legal blindness, T2DM, depression, and BPH who presented to [facility] on 9/27/2022 with a primary complaint of altered mental status.      Limited history able to be obtained from pt as he is unable to effectively answer questions or communicate outside of nodding head and occasional short phrases. Per chart review, pt with recent hospitalization earlier this month with similar presentation of encephalopathy in addition to hypoxia at that time. He was dx with a PE. AMS worked up without definitive answer and per notes, pt with minimal improvement in mental status and physical exam on discharge was "somnolent" and "Aox1 person. Requiring stimulation to rouse. Unable to respond to questions and commands this am."  Per those notations, prior to that admission he was A&Ox4.      Daughter initially able to speak with ED physician stating that he was A&O to person/place/time and most recently at his baseline able to discuss the [place] game as of this past Sunday. She did note that he has had significantly decreased po intake due to " "dysphagia and previously his ACP docs included that he did not want a PEG tube. Reported paperwork from [facility] concern change in mental status and possible aspiration PNA. ROS unable to be obtained due to pt's mental status.      In the ED, vitals significant for initial low BP then inc with good O2 saturation on RA. Labs significant for hypernatremia to 155, elevated troponin to 0.042. CT head negative for acute intracranial abnormality other than R cerumen impaction (vs soft tissue mass). CXR with elevation of R hemidiaphragm (stable from prior), severe emphysematous changes, and no focal consolidation. EKG with stable LBBB and PACs. He received 1L NS and 1L D5 as well as 1g Rocephin."      Hospital Course:  No notes on file    Interval History: NG tube removed over the weekend.  No acute events noted.     Medications:  Continuous Infusions:  Scheduled Meds:   apixaban  5 mg Oral BID    brimonidine 0.15 % OPTH DROP  1 drop Both Eyes BID    dorzolamide  1 drop Both Eyes BID    finasteride  5 mg Oral Daily    latanoprost  1 drop Both Eyes QHS    LIDOcaine  1 patch Transdermal Q24H    prednisoLONE acetate  1 drop Both Eyes QHS    tamsulosin  0.4 mg Oral QHS    timolol maleate 0.5%  1 drop Both Eyes BID     PRN Meds:acetaminophen, sodium chloride 0.9%    Objective:     Vital Signs (Most Recent):  Temp: 96.3 °F (35.7 °C) (10/03/22 0724)  Pulse: 84 (10/03/22 1203)  Resp: 16 (10/03/22 0724)  BP: (!) 124/59 (10/03/22 0724)  SpO2: 100 % (10/03/22 1124)   Vital Signs (24h Range):  Temp:  [96.3 °F (35.7 °C)-97.9 °F (36.6 °C)] 96.3 °F (35.7 °C)  Pulse:  [84-94] 84  Resp:  [16] 16  SpO2:  [93 %-100 %] 100 %  BP: (120-135)/(56-66) 124/59     Weight: 62.8 kg (138 lb 7.2 oz)  Body mass index is 19.31 kg/m².       Physical Exam  Constitutional:       General: He is not in acute distress.     Appearance: He is cachectic. He is ill-appearing (chronically).      Comments: Temporal wasting   NG Tube    HENT:      Head: " Normocephalic.      Mouth/Throat:      Mouth: Mucous membranes are dry.   Cardiovascular:      Rate and Rhythm: Normal rate.      Pulses: Normal pulses.   Pulmonary:      Effort: Pulmonary effort is normal.      Breath sounds: Decreased breath sounds present.   Abdominal:      General: Abdomen is flat. Bowel sounds are normal.      Palpations: Abdomen is soft.   Skin:     Capillary Refill: Capillary refill takes less than 2 seconds.      Coloration: Skin is pale.      Comments: Sacral wound per chart   Neurological:      Mental Status: Mental status is at baseline. He is lethargic.      Motor: Weakness present.   Psychiatric:         Speech: Speech is delayed.         Cognition and Memory: Memory is impaired.         Review of Symptoms        Symptom Assessment (ESAS 0-10 Scale)  Pain:  0  Dyspnea:  0  Anxiety:  0  Nausea:  0  Depression:  0  Anorexia:  0  Fatigue:  0  Insomnia:  0  Restlessness:  0  Agitation:  0  Unable to complete assessment due to Acuity of condition      CAM / Delirium:  Negative  Constipation:  Negative        Pain Assessment in Advanced Demential Scale (PAINAD)   Breathing - Independent of vocalization:  0  Negative vocalization:  0  Facial expression:  1  Body language:  0  Consolability:  0  Total:  1     ECOG Performance Status thGthrthathdtheth:th th5th Living Arrangements:  Lives in nursing home     Psychosocial/Cultural: Pt was a resident of [facility].  Pt will dc to inpatient hospice facility.     Spiritual:  F - Roseanne and Belief:  Orthodoxy   I - Importance:  Moderate   C - Community:  Family support   A - Address in Care:  Pastoral visits PRN      Time-Based Charting:  Yes  Chart Review: 7 minutes  Face to Face: 7 minutes  Symptom Assessment: 7 minutes  Coordination of Care: 7 minutes  Discharge Plannin minutes       Total Time Spent: 36 minutes        Advance Care Planning   Advance Directives:   Living Will: Yes        Copy on chart: Yes    [place]POST: No    Do Not Resuscitate  Status: Yes    Medical Power of : Yes    Agent's Name:  Hallie Franklin   Agent's Contact Number:  [phone]     Decision Making:  Patient answered questions and Family answered questions    Significant Labs: All pertinent labs within the past 24 hours have been reviewed.  CBC:   Recent Labs   Lab 09/30/22  0735   WBC 4.47   HGB 10.9*   HCT 34.6*   MCV 88        BMP:  No results for input(s): GLU, NA, K, CL, CO2, BUN, CREATININE, CALCIUM, MG in the last 24 hours.  LFT:  Lab Results   Component Value Date    AST 25 10/01/2022    ALKPHOS 108 10/01/2022    BILITOT 0.6 10/01/2022     Albumin:   Albumin   Date Value Ref Range Status   10/01/2022 2.3 (L) 3.5 - 5.2 g/dL Final     Protein:   Total Protein   Date Value Ref Range Status   10/01/2022 5.5 (L) 6.0 - 8.4 g/dL Final     Lactic acid:   Lab Results   Component Value Date    LACTATE 1.8 08/31/2022    LACTATE 1.8 04/20/2022       Significant Imaging: I have reviewed all pertinent imaging results/findings within the past 24 hours.      Patria Fontenot NP  Palliative Medicine  [facility]

## 2022-10-03 NOTE — DISCHARGE SUMMARY
"[facility] Hospital Medicine Discharge Summary    Primary Team: [facility] Hospitalist Team B  Attending Physician: Chandana Joyce MD  Resident: MD Yelitza  Intern: MD Pablo    Date of Admit: 9/27/2022  Date of Discharge: 10/3/2022    Discharge to: Hospice Facility ([facility] [place])  Condition: stable for hospice facility    Discharge Diagnoses     Patient Active Problem List   Diagnosis    Essential hypertension    Blindness    Diabetes mellitus due to underlying condition with hyperosmolarity without coma, without long-term current use of insulin    Mixed hyperlipidemia    Disorientation    Multiple subsegmental pulmonary emboli without acute cor pulmonale    Depression    Benign prostatic hyperplasia with post-void dribbling    History of prostate cancer    Psychosis    Seasonal allergies    Acute encephalopathy    Hypernatremia    Failure to thrive in adult    Impacted cerumen of right ear    Palliative care encounter       Consultants and Procedures     Consultants:  Palliative    Procedures:   None    Imaging:  CT Head without Con  Abdominal Xray  Chest Xray    Brief History of Present Illness      Adalberto Medina is a 82 y.o. male with h/o HTN, recent subsegmental PE, legal blindness, T2DM, depression, and BPH who presented to [facility] on 9/27/2022 with a primary complaint of altered mental status.     Per chart review, pt with recent hospitalization earlier this month with similar presentation of encephalopathy in addition to hypoxia at that time. He was dx with a PE. AMS worked up without definitive answer and per notes, pt with minimal improvement in mental status and physical exam on discharge was "somnolent" and "Aox1 person. Requiring stimulation to rouse. Unable to respond to questions and commands this am."  Per those notations, prior to that admission he was A&Ox4.     In the ED, vitals significant for initial low BP then inc with good O2 saturation on RA. Labs significant for hypernatremia to " 155, elevated troponin to 0.042. CT head negative for acute intracranial abnormality other than R cerumen impaction (vs soft tissue mass). CXR with elevation of R hemidiaphragm (stable from prior), severe emphysematous changes, and no focal consolidation. EKG with stable LBBB and PACs. He received 1L NS and 1L D5 as well as 1g Rocephin.     On further evaluation pt was found to be dehydrated and hypernatremic. Na improved with enteral free water to 250 q4hrs and d/c D5W gtt. Given pt's slow improvement and unclear if pt will ever return to baseline function, palliative care was consulted. After discussion with family, pt was transitioned to hospice and comfort care.     For the full HPI please refer to the History & Physical from this admission.    Hospital Course By Problem with Pertinent Findings     Acute on chronic Encephalopathy  Dementia   - Most likely in setting of hyperNa, recent meds started were eliquis and lexapro (more likely to cause hypoNa/SIADH)  - CT head without evidence of infarct or bleed, possible impacted cerumen vs soft tissue mass. On exam, evident that b/l ears are impacted with cerumen and will require disimpaction on OP basis by ENT.   - On 10/1, family decided to place pt on hospice care. Case management set up a placement for pt at a [facility]ice [facility] in [facility]). Dc'd lipitor, vitamins, lexapro as pt hospice. Kept flomax/finasteride for comfort.      Hypernatremia and Dehydration 2/2 Dec Po Intake:   Failure to Thrive:   - Pt reportedly not eating at [facility] and issues with dysphagia during last admit   - NG was inially placed for enteral replacement in pt with encephalopathy but eventually removed once pt was placed on hospice.  - Sodium has normalized, will hold of on daily CMP checks at this time.      Elevated Troponin, stable:  - EKG stable with known LBBB  - Troponin stable     PE:  - Continue Eliquis 5mg BID  - Recently dx at last hospital stay      HTN:  - BP  initially low but inc after fluids  - Home meds include losartan 100mg daily,  - BP stable at this time, will hold off on any medications at this time.     Severe Malnutrition:  - Pt unable to adequately take po for nourishment, and had standing appt with GI for placement of PEG but missed due to inability to acquire transport   - NG tube placement and initiate tube feeds  - Nutrition consulted for optimization   - NG tube removed, will continue pleasure feedings as tolerated.      T2DM:  - Initially initiated enteral feeds and follow labs for refeeding   - Nutrition consulted  - NG tube removed and pt now on pleasure feeds.   - Held glucose checks at this time.      Legally Blind  - Continue ophthalmic drops (timolol and brimonidine) for comfort     Depression:  - Cont home lexapro      BPH:  - Continue home flomax and proscar      Psychosis:  - H/o psychosis on risperidone 1mg qam and 2mg qhs - will hold in setting of encephalopathy and hyperNa        Discharge Medications        Medication List        CONTINUE taking these medications      acetaminophen 500 MG tablet  Commonly known as: TYLENOL     ALPHAGAN P 0.1 % Drop  Generic drug: brimonidine 0.1%     apixaban 5 mg Tab  Commonly known as: ELIQUIS  Take 1 tablet (5 mg total) by mouth 2 (two) times daily.     dorzolamide 2 % ophthalmic solution  Commonly known as: TRUSOPT     EScitalopram oxalate 5 MG Tab  Commonly known as: LEXAPRO  Take 1 tablet (5 mg total) by mouth once daily.     finasteride 5 mg tablet  Commonly known as: PROSCAR     fluticasone propionate 50 mcg/actuation nasal spray  Commonly known as: FLONASE     latanoprost 0.005 % ophthalmic solution     losartan 100 MG tablet  Commonly known as: COZAAR     prednisoLONE acetate 1 % Drps  Commonly known as: PRED FORTE     * risperiDONE 1 MG tablet  Commonly known as: RISPERDAL     * risperiDONE 2 MG tablet  Commonly known as: RISPERDAL     tamsulosin 0.4 mg Cap  Commonly known as: FLOMAX     timolol  maleate 0.5% 0.5 % Drop  Commonly known as: TIMOPTIC     zinc sulfate 50 mg zinc (220 mg) capsule  Commonly known as: ZINCATE           * This list has 2 medication(s) that are the same as other medications prescribed for you. Read the directions carefully, and ask your doctor or other care provider to review them with you.                STOP taking these medications      ascorbic acid (vitamin C) 500 MG tablet  Commonly known as: VITAMIN C     atorvastatin 20 MG tablet  Commonly known as: LIPITOR     multivitamin per tablet  Commonly known as: THERAGRAN              Discharge Information:   Diet: pleasure feeds as tolerated    Physical Activity: frequent turns for comfort             Instructions:  1. Take all medications as prescribed  2. Care per hospice facility    Follow-Up Appointments:  none    Mikaela Shah MD  [facility] Internal Medicine, [facility] Shah

## 2022-10-03 NOTE — NURSING
Patient discharging back to [facility] on Hospice care. Report called to [address] [place] [address] nurse. IV and telemetry removed, patient tolerated well.

## 2022-10-03 NOTE — PROGRESS NOTES
"[place] - Telemetry  Adult Nutrition  Progress Note    SUMMARY       Recommendations    Recommendation:  1. Encourage intake of meals & Boost as tolerated.   2. Monitor weight/labs.   3. RD to continue to follow to monitor po intake    Goals:   Pt will tolerate diet with at least 50% intake at meals by RD follow up  Nutrition Goal Status: new  Communication of RD Recs: reviewed with RN    Assessment and Plan  Failure to thrive in adult  Contributing Nutrition Diagnosis  Inadequate energy intake    Related to (etiology):   dyasphagia    Signs and Symptoms (as evidenced by):   Pureed diet with decreased po intake    Interventions:  Collaboration with other providers    Nutrition Diagnosis Status:   Continues    Malnutrition Assessment  Unable to assess NFPE at visit      Reason for Assessment  Reason For Assessment: RD follow-up  Diagnosis:  (hypernatremia)  Relevant Medical History: legal blindness, DM, HTN  General Information Comments: Pt on dysphagia pureed diet with Boost Glucose Control. Noted poor-fair intake at meals. Pt was asleep at visit with breakfast tray untouched. Unable to assess NFPE. Lico 13- R ear, scrotum, coccyx, & R hip wounds. Noted 27lb weight loss x 1 month. Family decided to pursue hospice instead of PEG tube. [facility] with AMS/dysphagia.  Nutrition Discharge Planning: pt to d/c on pureed diet    Nutrition Risk Screen  Nutrition Risk Screen: dysphagia or difficulty swallowing    Nutrition/Diet History  Food Preferences: no Lutheran or cultural food prefs identified  Spiritual, Cultural Beliefs, Alevism Practices, Values that Affect Care: no  Factors Affecting Nutritional Intake: difficulty/impaired swallowing    Anthropometrics  Temp: 96.3 °F (35.7 °C)  Height Method: Stated  Height: 5' 11" (180.3 cm)  Height (inches): 71 in  Weight Method: Bed Scale  Weight: 62.8 kg (138 lb 7.2 oz)  Weight (lb): 138.45 lb  Ideal Body Weight (IBW), Male: 172 lb  % Ideal Body " Weight, Male (lb): 80.49 %  BMI (Calculated): 19.3  BMI Grade: 18.5-24.9 - normal  Usual Body Weight (UBW), k.8 kg ()  % Usual Body Weight: 84.13  % Weight Change From Usual Weight: -16.04 %     Lab/Procedures/Meds  Pertinent Labs Reviewed: reviewed  Pertinent Labs Comments: Ca 7.6L, Phos 2.5L  Pertinent Medications Reviewed: reviewed  Pertinent Medications Comments: reviewed    Estimated/Assessed Needs  Weight Used For Calorie Calculations: 62.8 kg (138 lb 7.2 oz)  Energy Calorie Requirements (kcal): 0810-8211 (30-35 kcal/kg)  Energy Need Method: Kcal/kg  Protein Requirements: 81g (1.3g/kg)  Weight Used For Protein Calculations: 62.8 kg (138 lb 7.2 oz)  Estimated Fluid Requirement Method: RDA Method  RDA Method (mL): 1884  CHO Requirement: 225g    Nutrition Prescription Ordered  Current Diet Order: dysphagia pureed    Evaluation of Received Nutrient/Fluid Intake  I/O: 248/400  Energy Calories Required: not meeting needs  Protein Required: not meeting needs  Fluid Required: not meeting needs  Comments: LBM 10/2  % Intake of Estimated Energy Needs: 25 - 50 %  % Meal Intake: 25 - 50 %    Nutrition Risk  Level of Risk/Frequency of Follow-up:  (2xweekly)     Monitor and Evaluation  Food and Nutrient Intake: energy intake  Food and Nutrient Adminstration: enteral and parenteral nutrition administration  Physical Activity and Function: nutrition-related ADLs and IADLs  Anthropometric Measurements: weight  Biochemical Data, Medical Tests and Procedures: electrolyte and renal panel  Nutrition-Focused Physical Findings: overall appearance     Nutrition Follow-Up  RD Follow-up?: Yes

## 2022-10-03 NOTE — PLAN OF CARE
Recommendation:  1. Encourage intake of meals & Boost as tolerated.   2. Monitor weight/labs.   3. RD to continue to follow to monitor po intake    Goals:   Pt will tolerate diet with at least 50% intake at meals by RD follow up  Nutrition Goal Status: new

## 2022-10-03 NOTE — PLAN OF CARE
[facility]    FACILITY TRANSFER ORDERS      Patient Name: Adalberto Medina  YOB: 1940    PCP: Primary Doctor No   PCP Address: None  PCP Phone Number: None  PCP Fax: None    Encounter Date: 10/03/2022    Admit to: residential nursing home with planned admission to [facility]    Vital Signs:  Routine    Diagnoses:   Active Hospital Problems    Diagnosis  POA    *Hypernatremia [E87.0]  Yes    Palliative care encounter [Z51.5]  Not Applicable    Failure to thrive in adult [R62.7]  Unknown    Impacted cerumen of right ear [H61.21]  Unknown    Acute encephalopathy [G93.40]  Yes    Blindness [H54.7]  Yes     Formatting of this note might be different from the original.  Continue home eyedrop regimen  Timoptic 0.5% 1 drop both eyes twice daily  Dorzolamide 2% 1 drop both eyes twice daily  Prednisone AC 1% eyedrop instill 1 drop both eyes nightly  Xalatan 0.005% 1 drop both eyes nightly  Alphagan P 0.1% 1 drop both eyes twice daily      Diabetes mellitus due to underlying condition with hyperosmolarity without coma, without long-term current use of insulin [E08.00]  Yes     Formatting of this note might be different from the original.  Unknown A1c,  Per [place] notes on Metformin however was not in the discharge med rec.  Will order Accu-Cheks AC/at bedtime for 72 hours with SSI coverage.  And check A1c on Monday, 9/27/2021.  And initiate therapy if necessary      Essential hypertension [I10]  Yes     Formatting of this note is different from the original.  -Normotensive, continue Cozaar 100 mg p.o. daily        Resolved Hospital Problems   No resolved problems to display.       Allergies:Review of patient's allergies indicates:  No Known Allergies    Diet: regular diet    Activities: Activity as tolerated    Goals of Care Treatment Preferences:  Code Status: DNR    Health care agent: [facility] agent number: [phone]    Living Will: Yes     What is most important right  now is to focus on avoiding the hospital, symptom/pain control, quality of life, even if it means sacrificing a little time, comfort and QOL .  Accordingly, we have decided that the best plan to meet the patient's goals includes enrolling in hospice care.      Nursing:      Labs: None    CONSULTS:    None    MISCELLANEOUS CARE: Feeding assistance     WOUND CARE ORDERS  None    Medications: Review discharge medications with patient and family and provide education.      Current Discharge Medication List        CONTINUE these medications which have NOT CHANGED    Details   acetaminophen (TYLENOL) 500 MG tablet Take 500 mg by mouth 2 (two) times a day.      ALPHAGAN P 0.1 % Drop Place 1 drop into both eyes 2 (two) times a day.      apixaban (ELIQUIS) 5 mg Tab Take 1 tablet (5 mg total) by mouth 2 (two) times daily.  Qty: 60 tablet, Refills: 0    Comments: Start on 09/10/2022, after taking 6 days of Eliquis 10mg BID.      dorzolamide (TRUSOPT) 2 % ophthalmic solution Place 1 drop into both eyes 2 (two) times a day.      EScitalopram oxalate (LEXAPRO) 5 MG Tab Take 1 tablet (5 mg total) by mouth once daily.  Qty: 30 tablet, Refills: 11      finasteride (PROSCAR) 5 mg tablet Take 5 mg by mouth once daily.    Associated Diagnoses: Generalized weakness      fluticasone propionate (FLONASE) 50 mcg/actuation nasal spray 1 spray by Each Nostril route once daily.    Associated Diagnoses: Generalized weakness      latanoprost 0.005 % ophthalmic solution Place into both eyes every evening.    Associated Diagnoses: Generalized weakness      losartan (COZAAR) 100 MG tablet Take 100 mg by mouth once daily.    Comments: .  Associated Diagnoses: Generalized weakness      prednisoLONE acetate (PRED FORTE) 1 % DrpS Place 1 drop into both eyes every evening.    Associated Diagnoses: Generalized weakness      !! risperiDONE (RISPERDAL) 1 MG tablet Take 1 mg by mouth once daily. Daily @ 3pm      !! risperiDONE (RISPERDAL) 2 MG tablet Take  2 mg by mouth nightly.      tamsulosin (FLOMAX) 0.4 mg Cap Take 0.4 mg by mouth every evening.    Associated Diagnoses: Generalized weakness      timolol maleate 0.5% (TIMOPTIC) 0.5 % Drop Place 1 drop into both eyes 2 (two) times daily.      zinc sulfate (ZINCATE) 50 mg zinc (220 mg) capsule Take 220 mg by mouth once daily.       !! - Potential duplicate medications found. Please discuss with provider.        STOP taking these medications       ascorbic acid, vitamin C, (VITAMIN C) 500 MG tablet Comments:   Reason for Stopping:         atorvastatin (LIPITOR) 20 MG tablet Comments:   Reason for Stopping:         multivitamin (THERAGRAN) per tablet Comments:   Reason for Stopping:                  Immunizations Administered as of 10/3/2022       No immunizations on file.            This patient has had both covid vaccinations    Some patients may experience side effects after vaccination.  These may include fever, headache, muscle or joint aches.  Most symptoms resolve with 24-48 hours and do not require urgent medical evaluation unless they persist for more than 72 hours or symptoms are concerning for an unrelated medical condition.          _________________________________  Miguel Torre MD  10/03/2022

## 2022-10-03 NOTE — PLAN OF CARE
"   10/03/22 1150   Post-Acute Status   Post-Acute Authorization Hospice   Hospice Status Pending post acute provider review/more information requested  (VM left with Ray with [facility] to follow up on referral option for family. Awaiting return call.)   Discharge Delays (!) Post-Acute Set-up     BP (!) 124/59 (BP Location: Right arm, Patient Position: Lying)   Pulse 90   Temp 96.3 °F (35.7 °C) (Axillary)   Resp 16   Ht 5' 11" (1.803 m)   Wt 62.8 kg (138 lb 7.2 oz)   SpO2 100%   BMI 19.31 kg/m²      apixaban  5 mg Oral BID    brimonidine 0.15 % OPTH DROP  1 drop Both Eyes BID    dorzolamide  1 drop Both Eyes BID    finasteride  5 mg Oral Daily    latanoprost  1 drop Both Eyes QHS    LIDOcaine  1 patch Transdermal Q24H    prednisoLONE acetate  1 drop Both Eyes QHS    tamsulosin  0.4 mg Oral QHS    timolol maleate 0.5%  1 drop Both Eyes BID       "

## 2022-10-03 NOTE — PLAN OF CARE
"[facility] - Telemetry  Discharge Final Note    Primary Care Provider: Primary Doctor No    Expected Discharge Date: 10/3/2022    Pharmacist will go over home medications and reasons for medications. VN and bedside nurse to reiterate final discharge instructions.     Finalizing return placement.    Final Discharge Note (most recent)       Final Note - 10/03/22 9926          Final Note    Assessment Type Final Discharge Note (P)      Anticipated Discharge Disposition alf Nursing Home (P)    [facility]    Hospital Resources/Appts/Education Provided Appointments scheduled and added to AVS (P)         Post-Acute Status    Post-Acute Authorization Placement (P)      Post-Acute Placement Status Pending post-acute provider review/more information requested (P)    Orders sent to [facility]. PENDING ORDER ACCEPTANCE AND REPORT INFORMATION.    Discharge Delays Post-Acute Set-up (P)    AWAITING ORDER ACCEPTANCE AND REPORT INFORMATION.                  Future Appointments   Date Time Provider Department Center   10/4/2022  9:30 AM Luis A Adam MD [facility]     BP (!) 124/59 (BP Location: Right arm, Patient Position: Lying)   Pulse 84   Temp 96.3 °F (35.7 °C) (Axillary)   Resp 16   Ht 5' 11" (1.803 m)   Wt 62.8 kg (138 lb 7.2 oz)   SpO2 100%   BMI 19.31 kg/m²        Medication List        CONTINUE taking these medications      acetaminophen 500 MG tablet  Commonly known as: TYLENOL     ALPHAGAN P 0.1 % Drop  Generic drug: brimonidine 0.1%     apixaban 5 mg Tab  Commonly known as: ELIQUIS  Take 1 tablet (5 mg total) by mouth 2 (two) times daily.     dorzolamide 2 % ophthalmic solution  Commonly known as: TRUSOPT     EScitalopram oxalate 5 MG Tab  Commonly known as: LEXAPRO  Take 1 tablet (5 mg total) by mouth once daily.     finasteride 5 mg tablet  Commonly known as: PROSCAR     fluticasone propionate 50 mcg/actuation nasal spray  Commonly known as: FLONASE     latanoprost " 0.005 % ophthalmic solution     losartan 100 MG tablet  Commonly known as: COZAAR     prednisoLONE acetate 1 % Drps  Commonly known as: PRED FORTE     * risperiDONE 1 MG tablet  Commonly known as: RISPERDAL     * risperiDONE 2 MG tablet  Commonly known as: RISPERDAL     tamsulosin 0.4 mg Cap  Commonly known as: FLOMAX     timolol maleate 0.5% 0.5 % Drop  Commonly known as: TIMOPTIC     zinc sulfate 50 mg zinc (220 mg) capsule  Commonly known as: ZINCATE           * This list has 2 medication(s) that are the same as other medications prescribed for you. Read the directions carefully, and ask your doctor or other care provider to review them with you.                STOP taking these medications      ascorbic acid (vitamin C) 500 MG tablet  Commonly known as: VITAMIN C     atorvastatin 20 MG tablet  Commonly known as: LIPITOR     multivitamin per tablet  Commonly known as: THERAGRAN

## 2022-10-03 NOTE — SUBJECTIVE & OBJECTIVE
Interval History: NG tube removed over the weekend.  No acute events noted.     Medications:  Continuous Infusions:  Scheduled Meds:   apixaban  5 mg Oral BID    brimonidine 0.15 % OPTH DROP  1 drop Both Eyes BID    dorzolamide  1 drop Both Eyes BID    finasteride  5 mg Oral Daily    latanoprost  1 drop Both Eyes QHS    LIDOcaine  1 patch Transdermal Q24H    prednisoLONE acetate  1 drop Both Eyes QHS    tamsulosin  0.4 mg Oral QHS    timolol maleate 0.5%  1 drop Both Eyes BID     PRN Meds:acetaminophen, sodium chloride 0.9%    Objective:     Vital Signs (Most Recent):  Temp: 96.3 °F (35.7 °C) (10/03/22 0724)  Pulse: 84 (10/03/22 1203)  Resp: 16 (10/03/22 0724)  BP: (!) 124/59 (10/03/22 0724)  SpO2: 100 % (10/03/22 1124)   Vital Signs (24h Range):  Temp:  [96.3 °F (35.7 °C)-97.9 °F (36.6 °C)] 96.3 °F (35.7 °C)  Pulse:  [84-94] 84  Resp:  [16] 16  SpO2:  [93 %-100 %] 100 %  BP: (120-135)/(56-66) 124/59     Weight: 62.8 kg (138 lb 7.2 oz)  Body mass index is 19.31 kg/m².       Physical Exam  Constitutional:       General: He is not in acute distress.     Appearance: He is cachectic. He is ill-appearing (chronically).      Comments: Temporal wasting   NG Tube    HENT:      Head: Normocephalic.      Mouth/Throat:      Mouth: Mucous membranes are dry.   Cardiovascular:      Rate and Rhythm: Normal rate.      Pulses: Normal pulses.   Pulmonary:      Effort: Pulmonary effort is normal.      Breath sounds: Decreased breath sounds present.   Abdominal:      General: Abdomen is flat. Bowel sounds are normal.      Palpations: Abdomen is soft.   Skin:     Capillary Refill: Capillary refill takes less than 2 seconds.      Coloration: Skin is pale.      Comments: Sacral wound per chart   Neurological:      Mental Status: Mental status is at baseline. He is lethargic.      Motor: Weakness present.   Psychiatric:         Speech: Speech is delayed.         Cognition and Memory: Memory is impaired.         Review of Symptoms         Symptom Assessment (ESAS 0-10 Scale)  Pain:  0  Dyspnea:  0  Anxiety:  0  Nausea:  0  Depression:  0  Anorexia:  0  Fatigue:  0  Insomnia:  0  Restlessness:  0  Agitation:  0  Unable to complete assessment due to Acuity of condition      CAM / Delirium:  Negative  Constipation:  Negative        Pain Assessment in Advanced Demential Scale (PAINAD)   Breathing - Independent of vocalization:  0  Negative vocalization:  0  Facial expression:  1  Body language:  0  Consolability:  0  Total:  1     ECOG Performance Status thGthrthathdtheth:th th5th Living Arrangements:  Lives in nursing home     Psychosocial/Cultural: Pt was a resident of [facility].  Pt will dc to inpatient hospice facility.     Spiritual:  F - Roseanne and Belief:  Latter day   I - Importance:  Moderate   C - Community:  Family support   A - Address in Care:  Pastoral visits PRN      Time-Based Charting:  Yes  Chart Review: 7 minutes  Face to Face: 7 minutes  Symptom Assessment: 7 minutes  Coordination of Care: 7 minutes  Discharge Plannin minutes       Total Time Spent: 36 minutes        Advance Care Planning   Advance Directives:   Living Will: Yes        Copy on chart: Yes    LaPOST: No    Do Not Resuscitate Status: Yes    Medical Power of : Yes    Agent's Name:  Hallie Franklin   Agent's Contact Number:  [phone]     Decision Making:  Patient answered questions and Family answered questions    Significant Labs: All pertinent labs within the past 24 hours have been reviewed.  CBC:   Recent Labs   Lab 22  0735   WBC 4.47   HGB 10.9*   HCT 34.6*   MCV 88        BMP:  No results for input(s): GLU, NA, K, CL, CO2, BUN, CREATININE, CALCIUM, MG in the last 24 hours.  LFT:  Lab Results   Component Value Date    AST 25 10/01/2022    ALKPHOS 108 10/01/2022    BILITOT 0.6 10/01/2022     Albumin:   Albumin   Date Value Ref Range Status   10/01/2022 2.3 (L) 3.5 - 5.2 g/dL Final     Protein:   Total Protein   Date Value Ref Range Status    10/01/2022 5.5 (L) 6.0 - 8.4 g/dL Final     Lactic acid:   Lab Results   Component Value Date    LACTATE 1.8 08/31/2022    LACTATE 1.8 04/20/2022       Significant Imaging: I have reviewed all pertinent imaging results/findings within the past 24 hours.

## 2022-10-03 NOTE — PLAN OF CARE
10/03/22 1609   Post-Acute Status   Post-Acute Authorization Placement   Post-Acute Placement Status Set-up Complete/Auth obtained   Discharge Delays (!) Post-Acute Set-up     Pt to return to [facility]. Pt to return long-term with hospice. Referral info faxed to [facility] Discussed with admission. Communicated to facility that pt is [facility] bed [facility]. Orders accepted. Report information received.

## 2022-10-03 NOTE — PLAN OF CARE
Hospice orders sent to [facility] via [facility].  Pt has been accepted to [facility] in [place] but there is a waiting list.  Ray with [facility] is working on getting hospice paperwork signed with pt's daughter Hallie.  Then they will work on getting pt back to [facility] to wait for an open bed at [facility].  Pt is first on the waiting list.       10/03/22 1320   Post-Acute Status   Post-Acute Authorization Hospice       Kendell Eaton, RN   Supervisor Case Management-[facility]  [phone]

## 2022-10-03 NOTE — ASSESSMENT & PLAN NOTE
"10/3/22  At time of follow up visit pt resting in bed.  Pt opens eyes to voice but does not reply verbally. [facility] has no availability at this time. [facility] pt does not meet the in patient criteria at this point in time.  However, pt does meet the criteria for their Hospice House.  Pt is first on their waiting list.  New plan is for pt to d/c back to [facility] under hospice until the bed becomes available at [facility].  Per CM notes, daughter is in agreement with this plan.     9/30/ 22  At time of initial consult, pt laying in bed. No family at bedside.  Pt able to state his daughters name correctly.  Pt able to respond with a few one word answers when given ample time to form his word.  Pt endorses that he likes to watch "football" when asked what he like to watch on tv.  Pt is a resident of [facility].  PT is .    Reached out to pts daughter, Hallie Franklin, for collateral information.  Per Hallie, pt began to steadily decline over the past three months.  I expressed that this may be the patients new baseline and the pts daughter agrees with this.  Pt has been bed/ chair bound for the last year. Per daughter, she believed that the pts recent struggle has been the pts lack of nutritional intake.  Hallie believe that the pt is not being fed approprietly at the NH and thus has been requesting a PEG tube.  Per daughter, pt was to receive a PEG tube this week but the appointment was rescheduled.      Hallie associated Hospice ( as apparently this option has been brought up at the NH) as starving the pt to death. We discussed the theory and goals behind hospice.  Hallie expressed she has never actually had a true discussion about hospice and appriciated the information.    We discussed what the pt valued in life- pts daughter identified travel and eating.  We discussed what she thought the pt would express about the PEG tube placement if he could tell us.  Hallie is unsure " "what he would want but she expresses that she just wants to try to include him in the decision making process.  Hallie reports that she feels torn.  She expresses that she understands that the PEG tube will not fix her father and may ultimately prolong his decline and death , but feels like if she opts to not place the PEG tube she is "opting to starve her father to death." We discussed the body's natural process of slowing down as well.    Pts daughter to come to hospital this afternoon and we will have an in person meeting in an attempt to include the pt in medical decision making and establish goals of care.     Meeting was held at the bedside with the pt, his daughter Hallie, palliative medicine team, Kendell with Case management and Dr Chavez.  Goals of care were discussed with a focus on PEG tube placement.  After discussion, Hallie ultimately decided that she would like to show compassion to her father at the end of his life and offer him pleasure feeds and forgo any invasive procedures including PEG tube placement.  When asked about feeding tubes  the patient responded, " take it out".  Hallie endorsed that she wanted to support her fathers wishes even if this shortens his life to some degree. Hallie showed appropriate insight into his medical condition and decision making.     We discussed hospice enrollment moving forward. Pts daughter is agreeable to hospice and feels this option aligns with what her father would want. This is the pts fourth hospital admission since 5/2022 for similar etiologies. Pts FTT and steady decline are apparent to the Hallie and she endorses that each hospital admission has gotten harder on her father and she no longer sees improvements after admissions.   Hallie would like to enroll her father in inpatient hospice placement.    Disucssed pt with the Director of Hospice and Palliative Medicine Dr Christianson.  Pts prognosis of 2-4 weeks in the absence of artifical food and nutrition.  Pt is " hospice qualified.

## 2022-10-03 NOTE — NURSING
Shift assessment complete. Pt is resting in bed with eyes closed. No signs of distress. Bed in lowest position, locked, and side rails up x 3. Bed alarm on. Call light in reach. Door open.

## 2022-10-05 NOTE — PHYSICIAN QUERY
PT Name: Adalberto Medina  MR #: [id number]     DOCUMENTATION CLARIFICATION     CDS/: Patrick Babin RN, CCDS     Contact information:   [email or web address]     This form is a permanent document in the medical record.     Query Date: October 5, 2022    By submitting this query, we are merely seeking further clarification of documentation.  Please utilize your independent clinical judgment when addressing the question(s) below.    The Medical Record contains the following:   Indicators   Supporting Clinical Findings Location in Medical Record    Non-blanchable erythema/redness     x Ulcer/Injury/Skin Breakdown Patient responsive to voice, appears fatigued/lethargic, and thin;  --Skin:  Pt has stage 2 wound to right ear;   --sacrum (covered in abd pad)      Skin is warm and dry, stage 3 sacral wound, abrasion to R ear;   9/27 ED Nursing , [facility]            9/29 Hosp. Med.  ()          Deep Tissue Injury     x Wound care consult Coccyx/sacrum- Stage 4 pressure injury- present on admit- slough/pale pink tissue-     Coccyx/sacrum        R ear- full thickness wound- present on admit 100% slough  R- ear    9/29 wound care    x Acute/Chronic Illness AMS for 2-3 days ; Hypernatremia and Dehydration 2/2 Dec Po Intake: Failure to Thrive;  Musculoskeletal: Able to lift b/l hands against gravity, no effort to move b/l LE when prompted ;    pt is dependent and bed bound at baseline. NH resident;    9/27 H&P, Internal Med            9/28 Physical Therapy , plan of care     Medication/Treatment      Other       The clinical guidelines noted are only a system guideline. It does not replace the providers clinical judgment.    Per the National Pressure Injury Advisory Panel:   A pressure injury is localized damage to the skin and underlying soft tissue usually over a bony prominence or related to a medical or other device. The injury can present as intact skin or an open ulcer and may be painful. The injury occurs as  a result of intense and/or prolonged pressure or pressure in combination with shear. The tolerance of soft tissue for pressure and shear may also be affected by microclimate, nutrition, perfusion, co-morbidities and condition of the soft tissue.       Stage 1 Pressure Injury:  Intact skin with a localized area of non-blanchable erythema, which may appear differently in darkly pigmented skin. Color changes do not include purple or maroon discoloration; these may indicate deep tissue pressure injury.    Stage 2 Pressure Injury:  Partial-thickness loss of skin with exposed dermis. The wound bed is viable, pink or red, moist, and may also present as an intact or ruptured serum-filled blister.    Stage 3 Pressure Injury:  Full-thickness loss of skin, in which adipose (fat) is visible in the ulcer and granulation tissue and epibole (rolled wound edges) are often present. Slough and/or eschar may be visible. Undermining and tunneling may occur.    Stage 4 Pressure Injury:  Full-thickness skin and tissue loss with exposed or directly palpable fascia, muscle, tendon, ligament, cartilage or bone in the ulcer. Slough and/or eschar may be visible. Epibole (rolled edges), undermining and/or tunneling often occur.    Unstageable Pressure Injury:  Full-thickness skin and tissue loss in which the extent of tissue damage within the ulcer cannot be confirmed because it is obscured by slough or eschar. If slough or eschar is removed, a Stage 3 or Stage 4 pressure injury will be revealed.    Deep Tissue Pressure Injury:  Intact or non-intact skin with localized area of persistent non-blanchable deep red, maroon, purple discoloration or epidermal separation revealing a dark wound bed or blood filled blister. This injury results from intense and/or prolonged pressure and shear forces at the bone-muscle interface. The wound may evolve rapidly to reveal the actual extent of tissue injury, or may resolve without tissue loss. If necrotic  tissue, subcutaneous tissue, granulation tissue, fascia, muscle or other underlying structures are visible, this indicates a full thickness pressure injury (Unstageable, Stage 3 or Stage 4). Do not use DTPI to describe vascular, traumatic, neuropathic, or dermatologic conditions.   Medical Device Related Pressure Injury: This describes an etiology. Medical device related pressure injuries result from the use of devices designed and applied for diagnostic or therapeutic purposes. The resultant pressure injury generally conforms to the pattern or shape of the device. The injury should be staged using the staging system.    Mucosal Membrane Pressure Injury: Mucosal membrane pressure injury is found on mucous membranes with a history of a medical device in use at the location of the injury. Due to the anatomy of the tissue these ulcers cannot be staged.       Provider, please provide the integumentary diagnosis related to the documentation of Coccyx/sacral wound :     [   ] Pressure Injury/Decubitus Ulcer, Stage 1   [   ] Pressure Injury/Decubitus Ulcer, Stage 2   [   x] Pressure Injury/Decubitus Ulcer, Stage 3   [   ] Pressure Injury/Decubitus Ulcer, Stage 4   [   ] Other Integumentary Diagnosis (please specify):______________   [  ] Clinically Undetermined           Provider, please provide the integumentary diagnosis related to the documentation of Right ear wound :     [   ] Pressure Injury/Decubitus Ulcer, Stage 2   [  x ] Abrasion    [   ] Pressure Injury/Decubitus Ulcer, Stage 3    Pressure Injury/Decubitus Ulcer, Stage 4   [   ] Unstageable Pressure Injury     [  ] Clinically Undetermined         Please document in your progress notes daily for the duration of treatment until resolved and include in your discharge summary.    Reference:    TRACEY Mariscal., Kyle, SONAL SIDDIQUI., Goldberg, M., TRACEY Law, TRACEY Rodriguez, & TONNY Aguilera (2016). Revised National Pressure Ulcer Advisory Panel Pressure Injury Staging System:  Revised Pressure Injury Staging System. J Wound Ostomy Continence Nurs, 43(6), 585-597. doi:10.1097/won.[id number]    Form No.[id number]

## 2022-10-09 PROBLEM — E11.9 TYPE 2 DIABETES MELLITUS WITHOUT COMPLICATION, WITHOUT LONG-TERM CURRENT USE OF INSULIN: Status: ACTIVE | Noted: 2022-10-09

## 2022-10-09 PROBLEM — R41.0 DISORIENTATED: Status: ACTIVE | Noted: 2022-10-09

## 2022-12-05 PROBLEM — I26.94 MULTIPLE SUBSEGMENTAL PULMONARY EMBOLI WITHOUT ACUTE COR PULMONALE: Status: RESOLVED | Noted: 2022-09-01 | Resolved: 2022-12-05

## 2025-03-11 NOTE — ED NOTES
Addended by: KAYE PEREIRA on: 3/11/2025 04:04 PM     Modules accepted: Orders     Pt tolerated swallowing sips of water without drooling or coughing.